# Patient Record
Sex: FEMALE | Race: WHITE | NOT HISPANIC OR LATINO | Employment: OTHER | URBAN - METROPOLITAN AREA
[De-identification: names, ages, dates, MRNs, and addresses within clinical notes are randomized per-mention and may not be internally consistent; named-entity substitution may affect disease eponyms.]

---

## 2017-05-30 ENCOUNTER — TELEPHONE (OUTPATIENT)
Dept: OTHER | Facility: CLINIC | Age: 37
End: 2017-05-30

## 2017-06-05 DIAGNOSIS — Z32.01 PREGNANCY TEST POSITIVE: Primary | ICD-10-CM

## 2017-06-17 ENCOUNTER — HEALTH MAINTENANCE LETTER (OUTPATIENT)
Age: 37
End: 2017-06-17

## 2017-07-05 ENCOUNTER — TRANSFERRED RECORDS (OUTPATIENT)
Dept: HEALTH INFORMATION MANAGEMENT | Facility: CLINIC | Age: 37
End: 2017-07-05

## 2017-07-05 ENCOUNTER — PRENATAL OFFICE VISIT (OUTPATIENT)
Dept: NURSING | Facility: CLINIC | Age: 37
End: 2017-07-05
Payer: COMMERCIAL

## 2017-07-05 ENCOUNTER — PRENATAL OFFICE VISIT (OUTPATIENT)
Dept: OBGYN | Facility: CLINIC | Age: 37
End: 2017-07-05
Payer: COMMERCIAL

## 2017-07-05 VITALS
HEART RATE: 80 BPM | SYSTOLIC BLOOD PRESSURE: 98 MMHG | WEIGHT: 137.7 LBS | BODY MASS INDEX: 26 KG/M2 | HEIGHT: 61 IN | DIASTOLIC BLOOD PRESSURE: 60 MMHG

## 2017-07-05 VITALS
DIASTOLIC BLOOD PRESSURE: 72 MMHG | HEART RATE: 94 BPM | SYSTOLIC BLOOD PRESSURE: 97 MMHG | BODY MASS INDEX: 21.33 KG/M2 | WEIGHT: 136.2 LBS | RESPIRATION RATE: 16 BRPM

## 2017-07-05 DIAGNOSIS — Z34.82 PRENATAL CARE, SUBSEQUENT PREGNANCY, SECOND TRIMESTER: ICD-10-CM

## 2017-07-05 DIAGNOSIS — Z32.01 PREGNANCY TEST POSITIVE: ICD-10-CM

## 2017-07-05 DIAGNOSIS — O09.522 AMA (ADVANCED MATERNAL AGE) MULTIGRAVIDA 35+, SECOND TRIMESTER: ICD-10-CM

## 2017-07-05 DIAGNOSIS — Z34.80 ENCOUNTER FOR SUPERVISION OF OTHER NORMAL PREGNANCY: Primary | ICD-10-CM

## 2017-07-05 LAB
ABO + RH BLD: NORMAL
ABO + RH BLD: NORMAL
BETA HCG QUAL IFA URINE: POSITIVE
BLD GP AB SCN SERPL QL: NORMAL
BLOOD BANK CMNT PATIENT-IMP: NORMAL
ERYTHROCYTE [DISTWIDTH] IN BLOOD BY AUTOMATED COUNT: 12.4 % (ref 10–15)
HBV SURFACE AG SERPL QL IA: NONREACTIVE
HCT VFR BLD AUTO: 35.6 % (ref 35–47)
HGB BLD-MCNC: 12.5 G/DL (ref 11.7–15.7)
HIV 1+2 AB+HIV1 P24 AG SERPL QL IA: NORMAL
MCH RBC QN AUTO: 31.6 PG (ref 26.5–33)
MCHC RBC AUTO-ENTMCNC: 35.1 G/DL (ref 31.5–36.5)
MCV RBC AUTO: 90 FL (ref 78–100)
PLATELET # BLD AUTO: 246 10E9/L (ref 150–450)
RBC # BLD AUTO: 3.95 10E12/L (ref 3.8–5.2)
SPECIMEN EXP DATE BLD: NORMAL
WBC # BLD AUTO: 6.9 10E9/L (ref 4–11)

## 2017-07-05 PROCEDURE — 40000791 ZZHCL STATISTIC VERIFI PRENATAL TRISOMY 21,18,13: Mod: 90 | Performed by: OBSTETRICS & GYNECOLOGY

## 2017-07-05 PROCEDURE — 86900 BLOOD TYPING SEROLOGIC ABO: CPT | Performed by: FAMILY MEDICINE

## 2017-07-05 PROCEDURE — 87086 URINE CULTURE/COLONY COUNT: CPT | Performed by: FAMILY MEDICINE

## 2017-07-05 PROCEDURE — 87389 HIV-1 AG W/HIV-1&-2 AB AG IA: CPT | Performed by: FAMILY MEDICINE

## 2017-07-05 PROCEDURE — 36415 COLL VENOUS BLD VENIPUNCTURE: CPT | Performed by: FAMILY MEDICINE

## 2017-07-05 PROCEDURE — 86762 RUBELLA ANTIBODY: CPT | Performed by: FAMILY MEDICINE

## 2017-07-05 PROCEDURE — 86850 RBC ANTIBODY SCREEN: CPT | Performed by: FAMILY MEDICINE

## 2017-07-05 PROCEDURE — 87491 CHLMYD TRACH DNA AMP PROBE: CPT | Performed by: OBSTETRICS & GYNECOLOGY

## 2017-07-05 PROCEDURE — 99207 ZZC NO CHARGE NURSE ONLY: CPT

## 2017-07-05 PROCEDURE — G0145 SCR C/V CYTO,THINLAYER,RESCR: HCPCS | Performed by: OBSTETRICS & GYNECOLOGY

## 2017-07-05 PROCEDURE — 99207 ZZC FIRST OB VISIT: CPT | Performed by: OBSTETRICS & GYNECOLOGY

## 2017-07-05 PROCEDURE — 87088 URINE BACTERIA CULTURE: CPT | Performed by: FAMILY MEDICINE

## 2017-07-05 PROCEDURE — 86780 TREPONEMA PALLIDUM: CPT | Performed by: FAMILY MEDICINE

## 2017-07-05 PROCEDURE — 87624 HPV HI-RISK TYP POOLED RSLT: CPT | Performed by: OBSTETRICS & GYNECOLOGY

## 2017-07-05 PROCEDURE — 84703 CHORIONIC GONADOTROPIN ASSAY: CPT | Performed by: FAMILY MEDICINE

## 2017-07-05 PROCEDURE — 99000 SPECIMEN HANDLING OFFICE-LAB: CPT | Performed by: OBSTETRICS & GYNECOLOGY

## 2017-07-05 PROCEDURE — 87340 HEPATITIS B SURFACE AG IA: CPT | Performed by: FAMILY MEDICINE

## 2017-07-05 PROCEDURE — 87591 N.GONORRHOEAE DNA AMP PROB: CPT | Performed by: OBSTETRICS & GYNECOLOGY

## 2017-07-05 PROCEDURE — 86901 BLOOD TYPING SEROLOGIC RH(D): CPT | Performed by: FAMILY MEDICINE

## 2017-07-05 PROCEDURE — 85027 COMPLETE CBC AUTOMATED: CPT | Performed by: FAMILY MEDICINE

## 2017-07-05 RX ORDER — NITROFURANTOIN 25; 75 MG/1; MG/1
100 CAPSULE ORAL 2 TIMES DAILY
Qty: 14 CAPSULE | Refills: 0 | COMMUNITY
Start: 2017-07-05 | End: 2017-07-05

## 2017-07-05 NOTE — MR AVS SNAPSHOT
"              After Visit Summary   7/5/2017    Delicia Henry    MRN: [id number]           Patient Information     Date Of Birth          1980        Visit Information        Provider Department      7/5/2017 8:00 AM CR OB CLINIC [facility]        Today's Diagnoses     Pregnancy test positive           Follow-ups after your visit        Your next 10 appointments already scheduled     Jul 05, 2017 10:30 AM CDT   New Prenatal with Shreyas Keene MD   [facility] ([facility]    [address]  [facility] [id number] [phone]              Who to contact     If you have questions or need follow up information about today's clinic visit or your schedule please contact [facility] directly at [phone].  Normal or non-critical lab and imaging results will be communicated to you by MyChart, letter or phone within 4 business days after the clinic has received the results. If you do not hear from us within 7 days, please contact the clinic through MyChart or phone. If you have a critical or abnormal lab result, we will notify you by phone as soon as possible.  Submit refill requests through NextPage or call your pharmacy and they will forward the refill request to us. Please allow 3 business days for your refill to be completed.          Additional Information About Your Visit        MyChart Information     NextPage lets you send messages to your doctor, view your test results, renew your prescriptions, schedule appointments and more. To sign up, go to [email or web address] . Click on \"Log in\" on the left side of the screen, which will take you to the Welcome page. Then click on \"Sign up Now\" on the right side of the page.     You will be asked to enter the access code listed below, as well as some personal information. Please follow the directions to create your username and password.     Your access code is: " [email or web address]  Expires: 10/3/2017  8:58 AM     Your access code will  in 90 days. If you need help or a new code, please call your [place] clinic or [phone].        Care EveryWhere ID     This is your Care EveryWhere ID. This could be used by other organizations to access your [place] medical records  [id number]        Your Vitals Were     Pulse Respirations Last Period BMI (Body Mass Index)          94 16 2017 (Exact Date) 21.33 kg/m2         Blood Pressure from Last 3 Encounters:   17 97/72   14 118/60   14 121/68    Weight from Last 3 Encounters:   17 136 lb 3.2 oz (61.8 kg)   14 132 lb 6.4 oz (60.1 kg)   14 145 lb (65.8 kg)              We Performed the Following     ABO/Rh type and screen     Anti Treponema     Beta HCG qual IFA urine     CBC with platelets     Hepatitis B surface antigen     HIV Antigen Antibody Combo     Rubella Antibody IgG Quantitative     Urine Culture Aerobic Bacterial        Primary Care Provider Office Phone # Fax #    Kole Raymundo [phone]       [facility] [id number] [place] [id number]        Equal Access to Services     HILARIO STEPHENS AH: Hadii aad ku hadasho Soomaali, waaxda luqadaha, qaybta kaalmada adeegyada, waxay idiin hayaan adeeusebio concepcion lakori barlow. So [facility] [phone].    ATENCIÓN: Si habla español, tiene a redmond disposición servicios gratuitos de asistencia lingüística. ame al [phone].    We comply with applicable federal civil rights laws and [place] laws. We do not discriminate on the basis of race, color, national origin, age, disability sex, sexual orientation or gender identity.            Thank you!     Thank you for choosing [facility]  for your care. Our goal is always to provide you with excellent care. Hearing back from our patients is one way we can continue to improve our services. Please take a few minutes to complete the written survey  that you may receive in the mail after your visit with us. Thank you!             Your Updated Medication List - Protect others around you: Learn how to safely use, store and throw away your medicines at www.disposemymeds.org.          This list is accurate as of: 7/5/17  8:58 AM.  Always use your most recent med list.                   Brand Name Dispense Instructions for use Diagnosis    nitroFURantoin (macrocrystal-monohydrate) 100 MG capsule    MACROBID    14 capsule    Take 1 capsule (100 mg) by mouth 2 times daily    Pregnancy test positive       PRENATAL AD PO      Take  by mouth.

## 2017-07-05 NOTE — LETTER
July 17, 2017    Delicia Francoise Raegui  [address] [address]    Dear Delicia,  We are happy to inform you that your PAP smear result from 07/05/2017 is normal.  We are now able to do a follow up test on PAP smears. The DNA test is for HPV (Human Papilloma Virus). Cervical cancer is closely linked with certain types of HPV. Your result showed no evidence of HPV.  Therefore we recommend you return in 3 years for your next pap smear.  You will still need to return to the clinic every year for an annual exam and other preventive tests.  Please contact the clinic at [phone] with any questions.  Sincerely,    Shreyas Keene MD, MD

## 2017-07-05 NOTE — NURSING NOTE
Subjective: 36 year old patient presents for pregnancy confirmation. Her last menstrual period was 3/9/17. She has had a positive home test This is her 5th pregnancy, G5, P3. She has had previous vaginal deliveries. She would like to be seen Dr. Keene for her prenatal care. She has started prenatal vitamins.      Exam:  General Appearance: appears well.  Her urine pregnancy test is positive.    Assessment: positive pregnancy confirmation.    Plan: Patient has an EDC of 12/14/17. Is currently 16w6d weeks along. She is seeing Dr. Keene today for her first OB appointment at 10:30 am. Risk assessment done. We discussed basic pregnancy care, diet, exercise and prenatal vitamins.     Pre Term Labor Risk Assessment   1. Is the patient's age <18 or >40? no  2. Does the patient have a BMI < 18.5? no  3. If previous pregnancy, was delivery within previous 6 months? no  4. Have you ever been diagnosed with pyelonephritis? yes  5. Have you ever delivered a baby prior to 37 weeks gestation? no  6. Have you ever been told you have a uterine anomaly? no  7. Do you currently have uterine fibroids? no  8. Have you had any gynecological surgical procedures such as cervical conization, a LEEP procedure, laser treatment or cryosurgery of the cervix? no  9. Did your mother take JOVANY or any other hormones when she was pregnant with you? no  10. Did conception for this pregnancy occur via In Vitro Fertilization? no  11. Are you carrying twins? no  12. Do you currently use tobacco products? no  13. Prior to this pregnancy, how much alcohol did you drink each week? (if >7/week= high risk..)  3/wk wine  14. Since you learned you were pregnant, how much beer, wine or hard liquor did you drink per week? (anything >0 = high risk) no  15. Prior to learning you are pregnant, did you use any of the following: marijuana, prescription narcotics, speed, cocaine, heroin, hallucinogens or other drugs? (any use within 1 yr = high risk)  no  16. Since you  learned you are pregnant, have you used any of the following: marijuana, prescription narcotics, speed, cocaine, heroin, hallucinogens or other drugs? (any use = high risk)  no  17. Do you have a history of chemical dependency (yes=high risk)  no  18. Have you ever been treated for more than 1 Urinary Tract Infection during this pregnancy? no  19. Do you have a history of Depression, Bi-polar disorder, anxiety, schizophrenia or other mental illness?  no  20. Are you currently being treated for depression, bi-polar disorder, anxiety, schizophrenia or other mental illness? no  21. Have you had Chlamydia or gonorrhea during this pregnancy? no  22. Periodontal disease (gum disease)? no  23. Has anyone hit, slapped, kicked or otherwise hurt you? no  24. Has anyone forced you to have sex when you didn't want to? no  Summary:   Patient is not high risk for  Labor      Nancy Perez RN

## 2017-07-05 NOTE — PROGRESS NOTES
"SUBJECTIVE:  Delicia Henry is a 36 year old  P3, , woman who presents for 1st. OB vist. Patient's last menstrual period was 2017 (exact date).. Periods are regular q 28-30 days.   Current contraception: none        Past Medical History:   Diagnosis Date     Asthma     exercise induced asthma     MEDICAL HISTORY OF -     recurrent uti's urethral dilitation , tigonitis.     Molar pregnancy        Past Surgical History:   Procedure Laterality Date     DILATION AND CURETTAGE SUCTION  2012    Procedure:DILATION AND CURETTAGE SUCTION; DILATION AND CURETTAGE SUCTION ; Surgeon:LEIGHTON GALLAGHER; Location:[facility] OR     ENT SURGERY      tumor rem under ear, benign       Current Outpatient Prescriptions   Medication     Prenatal Vit-DSS-Fe Cbn-FA (PRENATAL AD PO)     No current facility-administered medications for this visit.        Allergies   Allergen Reactions     Avelox Swelling     Swelling tongue, dizzy, itching     Erythromycin Nausea     Sulfa Drugs      Unknown reaction       Social History   Substance Use Topics     Smoking status: Never Smoker     Smokeless tobacco: Never Used     Alcohol use Yes      Comment: occasional         Review of Systems    CONSTITUTIONAL:NEGATIVE  EYES: NEGATIVE  ENT/MOUTH: NEGATIVE  RESP: NEGATIVE  CV: NEGATIVE  GI: NEGATIVE  : NEGATIVE  MUSCULOSKELATAL: NEGATIVE  INTEGUMENTARY/SKIN: NEGATIVE  BREAST: NEGATIVE  NEURO: NEGATIVE.        OBJECTIVE:  BP 98/60  Pulse 80  Ht 5' 1\" (1.549 m)  Wt 137 lb 11.2 oz (62.5 kg)  LMP 2017 (Exact Date)  BMI 26.02 kg/m2  General appearance: Healthy.  Skin: Normal.  Mental Status: cooperative, normal affect, no gross thought process defects.  Thyroid: Normal to palpation, no enlargement or nodules noted.  Breasts: Symmetric without mass tenderness or discharge.  Axillary nodes negative.  Lungs: Clear to auscultation.   Heart.: Normal rate and rhythm.  No murmurs, clicks or gallops.   Abdomen: BS active. Soft, " non-tender, no masses or organomegaly.    Pelvis: normal external genitalia, normal groin lymphatics, normal urethral meatus, normal vaginal mucosa, normal cervix, normal adnexa, no masses or tenderness, uterus 16 week  size .  FHR  155.   Extremities: Normal .       ASSESSMENT:  First OB vist. Advanced maternal age.    PLAN:    1) Prenatal care  2)Discussed  genetic testing (maternal serum screen;  CVS or ammniocentisis if age or genetic history appropriate).

## 2017-07-05 NOTE — NURSING NOTE
"Chief Complaint   Patient presents with     Prenatal Care     16+6 NPN       Initial BP 98/60  Pulse 80  Ht 5' 1\" (1.549 m)  Wt 137 lb 11.2 oz (62.5 kg)  LMP 2017 (Exact Date)  BMI 26.02 kg/m2 Estimated body mass index is 26.02 kg/(m^2) as calculated from the following:    Height as of this encounter: 5' 1\" (1.549 m).    Weight as of this encounter: 137 lb 11.2 oz (62.5 kg).  BP completed using cuff size: regular        The following HM Due: NONE      The following patient reported/Care Every where data was sent to:  P ABSTRACT QUALITY INITIATIVES [43452]  NA     patient has appointment for today    Hermila BOWERS               "

## 2017-07-05 NOTE — MR AVS SNAPSHOT
"              After Visit Summary   2017    Delicia Henry    MRN: [id number]           Patient Information     Date Of Birth          1980        Visit Information        Provider Department      2017 10:30 AM Shreyas Keene MD [facility]        Today's Diagnoses     Encounter for supervision of other normal pregnancy    -  1    AMA (advanced maternal age) multigravida 35+, second trimester        Prenatal care, subsequent pregnancy, second trimester           Follow-ups after your visit        Who to contact     If you have questions or need follow up information about today's clinic visit or your schedule please contact [facility] directly at [phone].  Normal or non-critical lab and imaging results will be communicated to you by MyChart, letter or phone within 4 business days after the clinic has received the results. If you do not hear from us within 7 days, please contact the clinic through [employer] or phone. If you have a critical or abnormal lab result, we will notify you by phone as soon as possible.  Submit refill requests through [employer] or call your pharmacy and they will forward the refill request to us. Please allow 3 business days for your refill to be completed.          Additional Information About Your Visit        MyChart Information     [employer]ctions lets you send messages to your doctor, view your test results, renew your prescriptions, schedule appointments and more. To sign up, go to [email or web address] . Click on \"Log in\" on the left side of the screen, which will take you to the Welcome page. Then click on \"Sign up Now\" on the right side of the page.     You will be asked to enter the access code listed below, as well as some personal information. Please follow the directions to create your username and password.     Your access code is: [email or web address]  Expires: 10/3/2017  8:58 AM     Your access code will  in 90 days. If you " "need help or a new code, please call your [place] clinic or [phone].        Care EveryWhere ID     This is your Care EveryWhere ID. This could be used by other organizations to access your [place] medical records  [id number]        Your Vitals Were     Pulse Height Last Period BMI (Body Mass Index)          80 5' 1\" (1.549 m) 03/09/2017 (Exact Date) 26.02 kg/m2         Blood Pressure from Last 3 Encounters:   07/05/17 98/60   07/05/17 97/72   03/24/14 118/60    Weight from Last 3 Encounters:   07/05/17 137 lb 11.2 oz (62.5 kg)   07/05/17 136 lb 3.2 oz (61.8 kg)   03/24/14 132 lb 6.4 oz (60.1 kg)              We Performed the Following     CHLAMYDIA TRACHOMATIS PCR     HPV High Risk Types DNA Cervical     NEISSERIA GONORRHOEA PCR     Pap imaged thin layer screen with HPV - recommended age 30 - 65 years (select HPV order below)          Today's Medication Changes          These changes are accurate as of: 7/5/17 11:59 PM.  If you have any questions, ask your nurse or doctor.               Stop taking these medicines if you haven't already. Please contact your care team if you have questions.     nitroFURantoin (macrocrystal-monohydrate) 100 MG capsule   Commonly known as:  MACROBID   Stopped by:  Shreyas Keene MD                    Primary Care Provider Office Phone # Fax #    Kole Raymundo [phone]       [address] [address]        Equal Access to Services     [facility] : Hadii aad ku hadasho [place], waaxda luqadaha, qaybta kaalmada adeegyada, waxdavey idiin hayaan adeeg kharash la'aan . So [place] -[phone].    ATENCIÓN: Si habla español, tiene a redmond disposición servicios gratuitos de asistencia lingüística. Llame al [phone].    We comply with applicable federal civil rights laws and [place] laws. We do not discriminate on the basis of race, color, national origin, age, disability sex, sexual orientation or gender " identity.            Thank you!     Thank you for choosing [facility]  for your care. Our goal is always to provide you with excellent care. Hearing back from our patients is one way we can continue to improve our services. Please take a few minutes to complete the written survey that you may receive in the mail after your visit with us. Thank you!             Your Updated Medication List - Protect others around you: Learn how to safely use, store and throw away your medicines at www.disposemymeds.org.          This list is accurate as of: 7/5/17 11:59 PM.  Always use your most recent med list.                   Brand Name Dispense Instructions for use Diagnosis    PRENATAL AD PO      Take  by mouth.

## 2017-07-06 ENCOUNTER — TRANSFERRED RECORDS (OUTPATIENT)
Dept: HEALTH INFORMATION MANAGEMENT | Facility: CLINIC | Age: 37
End: 2017-07-06

## 2017-07-06 LAB
C TRACH DNA SPEC QL NAA+PROBE: NORMAL
N GONORRHOEA DNA SPEC QL NAA+PROBE: NORMAL
RUBV IGG SERPL IA-ACNC: 20 IU/ML
SPECIMEN SOURCE: NORMAL
SPECIMEN SOURCE: NORMAL

## 2017-07-07 LAB
BACTERIA SPEC CULT: ABNORMAL
COPATH REPORT: NORMAL
MICRO REPORT STATUS: ABNORMAL
PAP: NORMAL
SPECIMEN SOURCE: ABNORMAL
T PALLIDUM IGG+IGM SER QL: NEGATIVE

## 2017-07-11 LAB
FINAL DIAGNOSIS: NORMAL
HPV HR 12 DNA CVX QL NAA+PROBE: NEGATIVE
HPV16 DNA SPEC QL NAA+PROBE: NEGATIVE
HPV18 DNA SPEC QL NAA+PROBE: NEGATIVE
SPECIMEN DESCRIPTION: NORMAL

## 2017-07-12 ENCOUNTER — TELEPHONE (OUTPATIENT)
Dept: NURSING | Facility: CLINIC | Age: 37
End: 2017-07-12

## 2017-07-12 DIAGNOSIS — Z34.80 ENCOUNTER FOR SUPERVISION OF OTHER NORMAL PREGNANCY: ICD-10-CM

## 2017-07-12 NOTE — TELEPHONE ENCOUNTER
Verifi results: Negative    Test    Result     Interpretation  Chromosome 21  No aneuploidy detected     Results consistent with two copies of chromosome 21  Chromosome 18  No aneuploidy detected  Results consistent with two copies of chromosome 18  Chromosome 13  No aneuploidy detected  Results consistent with two copies of chromosome 13  Sex Chromosome  No aneuploidy detected  Results consistent with two sex chromosomes (XX-Female)    Pt informed of negative results. Pt wanted to know gender. Informed Female.

## 2017-07-14 NOTE — PROGRESS NOTES
Pap done on 7/5/17. Please send nl pap and Neg HPV letter, (73034) for pap in 3 years and annual physical in 1 year.   Chart reviewed,  rocio.     GINETTE Scanlon RN

## 2017-07-25 PROBLEM — Z34.80 PRENATAL CARE, SUBSEQUENT PREGNANCY: Status: ACTIVE | Noted: 2017-07-25

## 2017-07-26 DIAGNOSIS — O09.529 SUPERVISION OF HIGH-RISK PREGNANCY OF ELDERLY MULTIGRAVIDA: Primary | ICD-10-CM

## 2017-07-27 ENCOUNTER — TELEPHONE (OUTPATIENT)
Dept: OBGYN | Facility: CLINIC | Age: 37
End: 2017-07-27

## 2017-07-27 NOTE — TELEPHONE ENCOUNTER
Pt is calling in, she is 20 wks, , JACOB 17  Pt has a really bad head cold, her nose is running really bad.  She has tried Sudafed didn't help, She has tried the Afrin Nasal Spray and it helps for 10 minutes, she won't try benadryl as it knocks her out.    She is wondering what else she can try.  She asked about Mucinex Looked up and Category C for Pregnancy.    Please advise  Luigi GARDNER, RN advise

## 2017-07-27 NOTE — TELEPHONE ENCOUNTER
Called Dr. Keene at 4:15 pm and he stated I should have called the on call.  He stated what was on the sheet and that she could try Claritin.    I called the pt back and we informed he stated to try the Sudafed, or Claritin.    Pt understood and will give the Afrin spray, sudafed and another try, and try some Claritin    Luigi GARDNER RN

## 2017-08-01 LAB — NON INVASIVE PRENATAL TEST CELL FREE DNA: NORMAL

## 2017-08-07 ENCOUNTER — OFFICE VISIT (OUTPATIENT)
Dept: FAMILY MEDICINE | Facility: CLINIC | Age: 37
End: 2017-08-07
Payer: COMMERCIAL

## 2017-08-07 VITALS
HEIGHT: 67 IN | OXYGEN SATURATION: 100 % | BODY MASS INDEX: 21.35 KG/M2 | HEART RATE: 89 BPM | WEIGHT: 136 LBS | TEMPERATURE: 98 F | SYSTOLIC BLOOD PRESSURE: 110 MMHG | DIASTOLIC BLOOD PRESSURE: 62 MMHG

## 2017-08-07 DIAGNOSIS — J01.90 ACUTE SINUSITIS WITH SYMPTOMS GREATER THAN 10 DAYS: Primary | ICD-10-CM

## 2017-08-07 DIAGNOSIS — J20.9 ACUTE BRONCHITIS WITH COEXISTING CONDITION REQUIRING PROPHYLACTIC TREATMENT: ICD-10-CM

## 2017-08-07 DIAGNOSIS — Z33.1 PREGNANCY, INCIDENTAL: ICD-10-CM

## 2017-08-07 PROCEDURE — 99214 OFFICE O/P EST MOD 30 MIN: CPT | Performed by: PHYSICIAN ASSISTANT

## 2017-08-07 RX ORDER — ALBUTEROL SULFATE 90 UG/1
2 AEROSOL, METERED RESPIRATORY (INHALATION) EVERY 6 HOURS PRN
Qty: 1 INHALER | Refills: 0 | Status: SHIPPED | OUTPATIENT
Start: 2017-08-07 | End: 2017-08-29

## 2017-08-07 NOTE — PROGRESS NOTES
"  SUBJECTIVE:                                                    Delicia Henry is a 36 year old female who presents to clinic today for the following health issues:    Acute Illness  The patient, 5 months pregnant, reports a gradual onset of frontal/maxillary sinus headache and sore throat from coughing 3-4 weeks ago with associated sinus pressure, non productive cough, rhinorrhea, congestion, wheeze, and fatigue/achy. Sore throat has subsided. She reports having these symptoms in the past. She has tried treatment including afrin with moderate symptom relief, sudafed, flonase, and robitussin with no relief, and benadryl which \"knocks me out\". Denies any alleviating or exacerbating factors. She denies any fever, chills/sweats, conjunctivitis, ear pain, decreased appetite, NVD, dysuria, or any other related symptoms or complaints. Of note, she runs a [employer] and has has ill contacts.    Problem list and histories reviewed & adjusted, as indicated.  Additional history: none    Patient Active Problem List   Diagnosis     CARDIOVASCULAR SCREENING; LDL GOAL LESS THAN 160     Cleft palate     Exercise-induced asthma     UTI (urinary tract infection)     Encounter for supervision of other normal pregnancy     Indication for care in labor or delivery     Prenatal care, subsequent pregnancy     Past Surgical History:   Procedure Laterality Date     DILATION AND CURETTAGE SUCTION  2/13/2012    Procedure:DILATION AND CURETTAGE SUCTION; DILATION AND CURETTAGE SUCTION ; Surgeon:LEIGHTON GALLAGHER; Location:[facility] OR     ENT SURGERY      tumor rem under ear, benign       Social History   Substance Use Topics     Smoking status: Never Smoker     Smokeless tobacco: Never Used     Alcohol use No      Comment: Pregnant     Family History   Problem Relation Age of Onset     Allergies Father      seasonal     Allergies Sister      seasonal         Current Outpatient Prescriptions   Medication Sig Dispense Refill     " "amoxicillin-clavulanate (AUGMENTIN) 875-125 MG per tablet Take 1 tablet by mouth 2 times daily for 10 days 20 tablet 0     albuterol (PROAIR HFA/PROVENTIL HFA/VENTOLIN HFA) 108 (90 BASE) MCG/ACT Inhaler Inhale 2 puffs into the lungs every 6 hours as needed for shortness of breath / dyspnea or wheezing 1 Inhaler 0     Prenatal Vit-DSS-Fe Cbn-FA (PRENATAL AD PO) Take  by mouth.       Allergies   Allergen Reactions     Avelox Swelling     Swelling tongue, dizzy, itching     Erythromycin Nausea     Sulfa Drugs      Unknown reaction     Labs reviewed in EPIC      Reviewed and updated as needed this visit by clinical staff  Tobacco  Allergies  Meds  Problems  Med Hx  Surg Hx  Fam Hx  Soc Hx        Reviewed and updated as needed this visit by Provider  Tobacco  Allergies  Meds  Problems  Med Hx  Surg Hx  Fam Hx  Soc Hx          ROS:  Constitutional, HEENT, cardiovascular, pulmonary, GI, , musculoskeletal, neuro, skin, endocrine and psych systems are negative, except as otherwise noted.      OBJECTIVE:   /62 (BP Location: Left arm, Patient Position: Chair, Cuff Size: Adult Regular)  Pulse 89  Temp 98  F (36.7  C) (Oral)  Ht 5' 7\" (1.702 m)  Wt 136 lb (61.7 kg)  LMP 03/09/2017 (Exact Date)  SpO2 100%  BMI 21.3 kg/m2  Body mass index is 21.3 kg/(m^2).  GENERAL: healthy, alert and no distress  EYES: Eyes grossly normal to inspection, PERRL and conjunctivae and sclerae normal  HENT: Erythematous right TM. ear canals normal, nose and mouth without ulcers or lesions  NECK: no adenopathy, no asymmetry, masses, or scars and thyroid normal to palpation  RESP: Inspiratory and expiratory wheezes diffusely.  CV: regular rate and rhythm, normal S1 S2, no S3 or S4, no murmur, click or rub, no peripheral edema and peripheral pulses strong  MS: no gross musculoskeletal defects noted, no edema  NEURO: Normal strength and tone, mentation intact and speech normal  PSYCH: mentation appears normal, affect " normal/bright    Diagnostic Test Results:  none     ASSESSMENT/PLAN:     Delicia was seen today for sinus problem.    Diagnoses and all orders for this visit:    Acute sinusitis with symptoms greater than 10 days; Acute bronchitis with coexisting condition requiring prophylactic treatment; Pregnancy, incidental; Patient is 5 months pregnant.  She has done a large amount of conservative care without good relief.  Due to wheezing and PMH significant for asthma will start on albuterol. Will treat with Augmentin for 10 days, until complete. Follow with new or worsening symptoms.   -     amoxicillin-clavulanate (AUGMENTIN) 875-125 MG per tablet; Take 1 tablet by mouth 2 times daily for 10 days  -     albuterol (PROAIR HFA/PROVENTIL HFA/VENTOLIN HFA) 108 (90 BASE) MCG/ACT Inhaler; Inhale 2 puffs into the lungs every 6 hours as needed for shortness of breath / dyspnea or wheezing      See Patient Instructions    Nancy Carlos PA-C  [facility]

## 2017-08-07 NOTE — NURSING NOTE
"Chief Complaint   Patient presents with     Sinus Problem       Initial /62 (BP Location: Left arm, Patient Position: Chair, Cuff Size: Adult Regular)  Pulse 89  Temp 98  F (36.7  C) (Oral)  Ht 5' 7\" (1.702 m)  Wt 136 lb (61.7 kg)  LMP 03/09/2017 (Exact Date)  SpO2 100%  BMI 21.3 kg/m2 Estimated body mass index is 21.3 kg/(m^2) as calculated from the following:    Height as of this encounter: 5' 7\" (1.702 m).    Weight as of this encounter: 136 lb (61.7 kg).  Medication Reconciliation: complete   Csaba Mlnarik CMA    "

## 2017-08-07 NOTE — MR AVS SNAPSHOT
After Visit Summary   8/7/2017    Delicia Henry    MRN: [id number]           Patient Information     Date Of Birth          1980        Visit Information        Provider Department      8/7/2017 6:40 PM Nancy Carlos PA-C [facility]        Today's Diagnoses     Acute sinusitis with symptoms greater than 10 days    -  1    Acute bronchitis with coexisting condition requiring prophylactic treatment           Follow-ups after your visit        Your next 10 appointments already scheduled     Aug 11, 2017  9:45 AM CDT   US OB > 14 WEEKS COMPLETE SINGLE with RIUS1   [facility] ([facility])    [address]  [facility] [id number] [phone]           Please bring a list of your medicines (including vitamins, minerals and over-the-counter drugs). Also, tell your doctor about any allergies you may have. Wear comfortable clothes and leave your valuables at home.  If you re less than 20 weeks drink four 8-ounce glasses of fluid an hour before your exam. If you need to empty your bladder before your exam, try to release only a little urine. Then, drink another glass of fluid.  You may have up to two family members in the exam room. If you bring a small child, an adult must be there to care for him or her.  Please call the Imaging Department at your exam site with any questions.            Aug 21, 2017  2:15 PM CDT   ESTABLISHED PRENATAL with Shreyas Keene MD   [facility] ([facility])    [address] [place] [id number] [phone]              Who to contact     If you have questions or need follow up information about today's clinic visit or your schedule please contact [facility] directly at [phone].  Normal or non-critical lab and imaging results will be communicated to you by MyChart, letter or phone within 4 business days  "after the clinic has received the results. If you do not hear from us within 7 days, please contact the clinic through InPronto or phone. If you have a critical or abnormal lab result, we will notify you by phone as soon as possible.  Submit refill requests through InPronto or call your pharmacy and they will forward the refill request to us. Please allow 3 business days for your refill to be completed.          Additional Information About Your Visit        InPronto Information     InPronto lets you send messages to your doctor, view your test results, renew your prescriptions, schedule appointments and more. To sign up, go to [email or web address] . Click on \"Log in\" on the left side of the screen, which will take you to the Welcome page. Then click on \"Sign up Now\" on the right side of the page.     You will be asked to enter the access code listed below, as well as some personal information. Please follow the directions to create your username and password.     Your access code is: [email or web address]  Expires: 10/3/2017  8:58 AM     Your access code will  in 90 days. If you need help or a new code, please call your [place] clinic or [phone].        Care EveryWhere ID     This is your Care EveryWhere ID. This could be used by other organizations to access your [place] medical records  [id number]        Your Vitals Were     Pulse Temperature Height Last Period Pulse Oximetry BMI (Body Mass Index)    89 98  F (36.7  C) (Oral) 5' 7\" (1.702 m) 2017 (Exact Date) 100% 21.3 kg/m2       Blood Pressure from Last 3 Encounters:   17 110/62   17 98/60   17 97/72    Weight from Last 3 Encounters:   17 136 lb (61.7 kg)   17 137 lb 11.2 oz (62.5 kg)   17 136 lb 3.2 oz (61.8 kg)              Today, you had the following     No orders found for display         Today's Medication Changes          These changes are accurate as of: 17  7:24 PM.  If you have any questions, ask your " nurse or doctor.               Start taking these medicines.        Dose/Directions    albuterol 108 (90 BASE) MCG/ACT Inhaler   Commonly known as:  PROAIR HFA/PROVENTIL HFA/VENTOLIN HFA   Used for:  Acute bronchitis with coexisting condition requiring prophylactic treatment   Started by:  Nancy Carlos PA-C        Dose:  2 puff   Inhale 2 puffs into the lungs every 6 hours as needed for shortness of breath / dyspnea or wheezing   Quantity:  1 Inhaler   Refills:  0       amoxicillin-clavulanate 875-125 MG per tablet   Commonly known as:  AUGMENTIN   Used for:  Acute sinusitis with symptoms greater than 10 days, Acute bronchitis with coexisting condition requiring prophylactic treatment   Started by:  Nancy Carlos PA-C        Dose:  1 tablet   Take 1 tablet by mouth 2 times daily for 10 days   Quantity:  20 tablet   Refills:  0            Where to get your medicines      These medications were sent to [facility] [address] [place] [address] AT [facility] [address] [place] [address], [facility] [address]    Hours:  24-hours Phone:  [phone]     albuterol 108 (90 BASE) MCG/ACT Inhaler    amoxicillin-clavulanate 875-125 MG per tablet                Primary Care Provider Office Phone # Fax #    Kole Raymundo [phone]       [address] [address]        Equal Access to Services     [facility] AH: Hadii edgardo hernández hadasho Sorabia, waaxda luqadaha, qaybta kaalmada adeeusebioyada, carlo pacheco . So [facility] [phone].    ATENCIÓN: Si habla español, tiene a redmond disposición servicios gratuitos de asistencia lingüística. Llame al [phone].    We comply with applicable federal civil rights laws and [place] laws. We do not discriminate on the basis of race, color, national origin, age, disability sex, sexual orientation or gender identity.            Thank you!     Thank you for  choosing [facility]  for your care. Our goal is always to provide you with excellent care. Hearing back from our patients is one way we can continue to improve our services. Please take a few minutes to complete the written survey that you may receive in the mail after your visit with us. Thank you!             Your Updated Medication List - Protect others around you: Learn how to safely use, store and throw away your medicines at www.disposemymeds.org.          This list is accurate as of: 8/7/17  7:24 PM.  Always use your most recent med list.                   Brand Name Dispense Instructions for use Diagnosis    albuterol 108 (90 BASE) MCG/ACT Inhaler    PROAIR HFA/PROVENTIL HFA/VENTOLIN HFA    1 Inhaler    Inhale 2 puffs into the lungs every 6 hours as needed for shortness of breath / dyspnea or wheezing    Acute bronchitis with coexisting condition requiring prophylactic treatment       amoxicillin-clavulanate 875-125 MG per tablet    AUGMENTIN    20 tablet    Take 1 tablet by mouth 2 times daily for 10 days    Acute sinusitis with symptoms greater than 10 days, Acute bronchitis with coexisting condition requiring prophylactic treatment       PRENATAL AD PO      Take  by mouth.

## 2017-08-11 ENCOUNTER — RADIANT APPOINTMENT (OUTPATIENT)
Dept: ULTRASOUND IMAGING | Facility: CLINIC | Age: 37
End: 2017-08-11
Attending: OBSTETRICS & GYNECOLOGY
Payer: COMMERCIAL

## 2017-08-11 DIAGNOSIS — O09.529 SUPERVISION OF HIGH-RISK PREGNANCY OF ELDERLY MULTIGRAVIDA: ICD-10-CM

## 2017-08-11 PROCEDURE — 76805 OB US >/= 14 WKS SNGL FETUS: CPT | Performed by: FAMILY MEDICINE

## 2017-08-28 ENCOUNTER — PRENATAL OFFICE VISIT (OUTPATIENT)
Dept: OBGYN | Facility: CLINIC | Age: 37
End: 2017-08-28
Payer: COMMERCIAL

## 2017-08-28 VITALS
WEIGHT: 143.1 LBS | DIASTOLIC BLOOD PRESSURE: 72 MMHG | SYSTOLIC BLOOD PRESSURE: 102 MMHG | TEMPERATURE: 98.2 F | HEIGHT: 67 IN | BODY MASS INDEX: 22.46 KG/M2

## 2017-08-28 DIAGNOSIS — Z34.82 PRENATAL CARE, SUBSEQUENT PREGNANCY, SECOND TRIMESTER: Primary | ICD-10-CM

## 2017-08-28 PROCEDURE — 99207 ZZC PRENATAL VISIT: CPT | Performed by: OBSTETRICS & GYNECOLOGY

## 2017-08-28 NOTE — MR AVS SNAPSHOT
"              After Visit Summary   8/28/2017    Delicia Henry    MRN: [id number]           Patient Information     Date Of Birth          1980        Visit Information        Provider Department      8/28/2017 3:45 PM Shreyas Keene MD [facility]        Today's Diagnoses     Prenatal care, subsequent pregnancy, second trimester    -  1       Follow-ups after your visit        Your next 10 appointments already scheduled     Sep 18, 2017  2:30 PM CDT   ESTABLISHED PRENATAL with Shreyas Keene MD   [facility] ([facility]    [address]  [facility] [address]   [phone]              Who to contact     If you have questions or need follow up information about today's clinic visit or your schedule please contact [facility] directly at [phone].  Normal or non-critical lab and imaging results will be communicated to you by Brain Tunnel[employer]ix Technologieshart, letter or phone within 4 business days after the clinic has received the results. If you do not hear from us within 7 days, please contact the clinic through MyChart or phone. If you have a critical or abnormal lab result, we will notify you by phone as soon as possible.  Submit refill requests through [email or web address] or call your pharmacy and they will forward the refill request to us. Please allow 3 business days for your refill to be completed.          Additional Information About Your Visit        MyChart Information     [email or web address] lets you send messages to your doctor, view your test results, renew your prescriptions, schedule appointments and more. To sign up, go to [email or web address] . Click on \"Log in\" on the left side of the screen, which will take you to the Welcome page. Then click on \"Sign up Now\" on the right side of the page.     You will be asked to enter the access code listed below, as well as some personal information. Please follow the directions to create your " "username and password.     Your access code is: [email or web address]  Expires: 10/3/2017  8:58 AM     Your access code will  in 90 days. If you need help or a new code, please call your [place] clinic or [phone].        Care EveryWhere ID     This is your Care EveryWhere ID. This could be used by other organizations to access your [place] medical records  [id number]        Your Vitals Were     Temperature Height Last Period Breastfeeding? BMI (Body Mass Index)       98.2  F (36.8  C) (Oral) 5' 7\" (1.702 m) 2017 (Exact Date) No 22.41 kg/m2        Blood Pressure from Last 3 Encounters:   17 102/72   17 110/62   17 98/60    Weight from Last 3 Encounters:   17 143 lb 1.6 oz (64.9 kg)   17 136 lb (61.7 kg)   17 137 lb 11.2 oz (62.5 kg)              Today, you had the following     No orders found for display       Primary Care Provider Office Phone # Fax #    Kole Raymundo [phone] [address] [facility] [address]        Equal Access to Services     [facility] AH: Hadii edgardo holliso Soomaali, waaxda luqadaha, qaybta kaalmada adeegyada, carlo pacheco . So [facility] [phone].    ATENCIÓN: Si habla español, tiene a redmond disposición servicios gratuitos de asistencia lingüística. ame al [phone].    We comply with applicable federal civil rights laws and [place] laws. We do not discriminate on the basis of race, color, national origin, age, disability sex, sexual orientation or gender identity.            Thank you!     Thank you for choosing [facility]  for your care. Our goal is always to provide you with excellent care. Hearing back from our patients is one way we can continue to improve our services. Please take a few minutes to complete the written survey that you may receive in the mail after your visit with us. Thank you!             Your Updated Medication List - Protect others around " you: Learn how to safely use, store and throw away your medicines at www.disposemymeds.org.          This list is accurate as of: 8/28/17 11:59 PM.  Always use your most recent med list.                   Brand Name Dispense Instructions for use Diagnosis    PRENATAL AD PO      Take  by mouth.

## 2017-08-28 NOTE — NURSING NOTE
"Chief Complaint   Patient presents with     Prenatal Care     24weeks 4days       Initial /72  Temp 98.2  F (36.8  C) (Oral)  Ht 5' 7\" (1.702 m)  Wt 143 lb 1.6 oz (64.9 kg)  LMP 03/09/2017 (Exact Date)  Breastfeeding? No  BMI 22.41 kg/m2 Estimated body mass index is 22.41 kg/(m^2) as calculated from the following:    Height as of this encounter: 5' 7\" (1.702 m).    Weight as of this encounter: 143 lb 1.6 oz (64.9 kg).  Medication Reconciliation: complete   Faith Brooks MA    "

## 2017-08-29 DIAGNOSIS — J20.9 ACUTE BRONCHITIS WITH COEXISTING CONDITION REQUIRING PROPHYLACTIC TREATMENT: ICD-10-CM

## 2017-08-29 NOTE — TELEPHONE ENCOUNTER
Ventolin        Last Written Prescription Date: 8/7/17  Last Fill Quantity: 1, # refills: 0    Last Office [facility] or [facility] prescribing provider:  8/7/17   Future Office Visit:    Next 5 appointments (look out 90 days)     Sep 18, 2017  2:30 PM CDT   ESTABLISHED PRENATAL with Shreyas Keene MD   [facility]    [address]  [facility] [address] [phone]                   Date of Last Asthma Action Plan Letter:   There are no preventive care reminders to display for this patient.   Asthma Control Test: No flowsheet data found.    Date of Last Spirometry Test:   No results found for this or any previous visit.

## 2017-08-30 RX ORDER — ALBUTEROL SULFATE 90 UG/1
AEROSOL, METERED RESPIRATORY (INHALATION)
Qty: 18 G | Refills: 0 | Status: SHIPPED | OUTPATIENT
Start: 2017-08-30 | End: 2018-01-17

## 2017-08-30 NOTE — TELEPHONE ENCOUNTER
"Called home number-voice mail says \"legacy insurance\"  No message left    patient had a prenatal office visit and reported not taking medication on 08/28/17    Tegan Lawrence RN  [facility]  [phone]      "

## 2017-09-18 ENCOUNTER — PRENATAL OFFICE VISIT (OUTPATIENT)
Dept: OBGYN | Facility: CLINIC | Age: 37
End: 2017-09-18
Payer: COMMERCIAL

## 2017-09-18 VITALS — WEIGHT: 144.5 LBS | SYSTOLIC BLOOD PRESSURE: 98 MMHG | BODY MASS INDEX: 22.63 KG/M2 | DIASTOLIC BLOOD PRESSURE: 68 MMHG

## 2017-09-18 DIAGNOSIS — Z34.82 PRENATAL CARE, SUBSEQUENT PREGNANCY, SECOND TRIMESTER: Primary | ICD-10-CM

## 2017-09-18 LAB
BLD GP AB SCN SERPL QL: NORMAL
GLUCOSE 1H P 50 G GLC PO SERPL-MCNC: 103 MG/DL (ref 60–129)
HGB BLD-MCNC: 12 G/DL (ref 11.7–15.7)

## 2017-09-18 PROCEDURE — 86850 RBC ANTIBODY SCREEN: CPT | Performed by: OBSTETRICS & GYNECOLOGY

## 2017-09-18 PROCEDURE — 00000218 ZZHCL STATISTIC OBHBG - HEMOGLOBIN: Performed by: OBSTETRICS & GYNECOLOGY

## 2017-09-18 PROCEDURE — 90471 IMMUNIZATION ADMIN: CPT | Performed by: OBSTETRICS & GYNECOLOGY

## 2017-09-18 PROCEDURE — 82950 GLUCOSE TEST: CPT | Performed by: OBSTETRICS & GYNECOLOGY

## 2017-09-18 PROCEDURE — 99207 ZZC PRENATAL VISIT: CPT | Performed by: OBSTETRICS & GYNECOLOGY

## 2017-09-18 PROCEDURE — 90715 TDAP VACCINE 7 YRS/> IM: CPT | Performed by: OBSTETRICS & GYNECOLOGY

## 2017-09-18 PROCEDURE — 36415 COLL VENOUS BLD VENIPUNCTURE: CPT | Performed by: OBSTETRICS & GYNECOLOGY

## 2017-09-18 PROCEDURE — 86780 TREPONEMA PALLIDUM: CPT | Performed by: OBSTETRICS & GYNECOLOGY

## 2017-09-18 PROCEDURE — 96372 THER/PROPH/DIAG INJ SC/IM: CPT | Performed by: OBSTETRICS & GYNECOLOGY

## 2017-09-18 NOTE — MR AVS SNAPSHOT
"              After Visit Summary   2017    Delicia Henry    MRN: [id number]           Patient Information     Date Of Birth          1980        Visit Information        Provider Department      2017 2:30 PM Shreyas Keene MD [facility]        Today's Diagnoses     Prenatal care, subsequent pregnancy, second trimester    -  1       Follow-ups after your visit        Who to contact     If you have questions or need follow up information about today's clinic visit or your schedule please contact [facility] directly at [phone].  Normal or non-critical lab and imaging results will be communicated to you by Axial Healthcarehart, letter or phone within 4 business days after the clinic has received the results. If you do not hear from us within 7 days, please contact the clinic through Wallflowert or phone. If you have a critical or abnormal lab result, we will notify you by phone as soon as possible.  Submit refill requests through [email or web address] or call your pharmacy and they will forward the refill request to us. Please allow 3 business days for your refill to be completed.          Additional Information About Your Visit        MyChart Information     [email or web address] lets you send messages to your doctor, view your test results, renew your prescriptions, schedule appointments and more. To sign up, go to [email or web address] . Click on \"Log in\" on the left side of the screen, which will take you to the Welcome page. Then click on \"Sign up Now\" on the right side of the page.     You will be asked to enter the access code listed below, as well as some personal information. Please follow the directions to create your username and password.     Your access code is: [email or web address]  Expires: 10/3/2017  8:58 AM     Your access code will  in 90 days. If you need help or a new code, please call your [facility] or [phone].        Care EveryWhere ID     This is your Care " EveryWhere ID. This could be used by other organizations to access your [place] medical records  [id number]        Your Vitals Were     Last Period BMI (Body Mass Index)                03/09/2017 (Exact Date) 22.63 kg/m2           Blood Pressure from Last 3 Encounters:   09/18/17 98/68   08/28/17 102/72   08/07/17 110/62    Weight from Last 3 Encounters:   09/18/17 144 lb 8 oz (65.5 kg)   08/28/17 143 lb 1.6 oz (64.9 kg)   08/07/17 136 lb (61.7 kg)              We Performed the Following     Anti Treponema     Antibody screen red cell     Glucose tolerance, gest screen, 1 hour     OB hemoglobin        Primary Care Provider Office Phone # Fax #    Kole Raymundo [phone] [address] [facility] [address]        Equal Access to Services     [facility] : Hadii edgardo holliso Sorabia, waaxda luqadaha, qaybta kaalmada adejana, carlo pacheco . So [facility] [phone].    ATENCIÓN: Si habla español, tiene a redmond disposición servicios gratuitos de asistencia lingüística. Llame al [phone].    We comply with applicable federal civil rights laws and [place] laws. We do not discriminate on the basis of race, color, national origin, age, disability sex, sexual orientation or gender identity.            Thank you!     Thank you for choosing [facility]  for your care. Our goal is always to provide you with excellent care. Hearing back from our patients is one way we can continue to improve our services. Please take a few minutes to complete the written survey that you may receive in the mail after your visit with us. Thank you!             Your Updated Medication List - Protect others around you: Learn how to safely use, store and throw away your medicines at www.disposemymeds.org.          This list is accurate as of: 9/18/17  3:01 PM.  Always use your most recent med list.                   Brand Name Dispense Instructions for use Diagnosis     PRENATAL AD PO      Take  by mouth.        VENTOLIN  (90 BASE) MCG/ACT Inhaler   Generic drug:  albuterol     18 g    INHALE 2 PUFFS EVERY 6 HOURS AS NEEDED FOR SHORTNESS OF BREATH OR WHEEZING    Acute bronchitis with coexisting condition requiring prophylactic treatment

## 2017-09-18 NOTE — NURSING NOTE
"Chief Complaint   Patient presents with     Prenatal Care   27w4d  Pt having 12 hour glucose, pt is B-.  Discuss BC.  Shara Finley MA      Initial BP 98/68 (BP Location: Right arm, Patient Position: Chair, Cuff Size: Adult Regular)  Wt 144 lb 8 oz (65.5 kg)  LMP 03/09/2017 (Exact Date)  BMI 22.63 kg/m2 Estimated body mass index is 22.63 kg/(m^2) as calculated from the following:    Height as of 8/28/17: 5' 7\" (1.702 m).    Weight as of this encounter: 144 lb 8 oz (65.5 kg).  Medication Reconciliation: complete    "

## 2017-09-19 LAB — T PALLIDUM IGG+IGM SER QL: NEGATIVE

## 2017-10-16 ENCOUNTER — PRENATAL OFFICE VISIT (OUTPATIENT)
Dept: OBGYN | Facility: CLINIC | Age: 37
End: 2017-10-16
Payer: COMMERCIAL

## 2017-10-16 VITALS
WEIGHT: 146.1 LBS | BODY MASS INDEX: 23.48 KG/M2 | HEIGHT: 66 IN | DIASTOLIC BLOOD PRESSURE: 70 MMHG | SYSTOLIC BLOOD PRESSURE: 112 MMHG

## 2017-10-16 DIAGNOSIS — Z34.83 PRENATAL CARE, SUBSEQUENT PREGNANCY IN THIRD TRIMESTER: Primary | ICD-10-CM

## 2017-10-16 PROCEDURE — 99207 ZZC PRENATAL VISIT: CPT | Performed by: OBSTETRICS & GYNECOLOGY

## 2017-10-16 NOTE — NURSING NOTE
"31w4d  Chief Complaint   Patient presents with     Prenatal Care       Initial /70  Ht 5' 6\" (1.676 m)  Wt 146 lb 1.6 oz (66.3 kg)  LMP 03/09/2017 (Exact Date)  BMI 23.58 kg/m2 Estimated body mass index is 23.58 kg/(m^2) as calculated from the following:    Height as of this encounter: 5' 6\" (1.676 m).    Weight as of this encounter: 146 lb 1.6 oz (66.3 kg).  Medication Reconciliation: complete      "

## 2017-10-16 NOTE — MR AVS SNAPSHOT
"              After Visit Summary   10/16/2017    Delicia Henry    MRN: [id number]           Patient Information     Date Of Birth          1980        Visit Information        Provider Department      10/16/2017 3:30 PM Shreyas Keene MD [facility]        Today's Diagnoses     Prenatal care, subsequent pregnancy in third trimester    -  1       Follow-ups after your visit        Who to contact     If you have questions or need follow up information about today's clinic visit or your schedule please contact [facility] directly at [phone].  Normal or non-critical lab and imaging results will be communicated to you by Kenta Bi[employer]hart, letter or phone within 4 business days after the clinic has received the results. If you do not hear from us within 7 days, please contact the clinic through SYSTRANt or phone. If you have a critical or abnormal lab result, we will notify you by phone as soon as possible.  Submit refill requests through EcoEridania or call your pharmacy and they will forward the refill request to us. Please allow 3 business days for your refill to be completed.          Additional Information About Your Visit        MyChart Information     EcoEridania lets you send messages to your doctor, view your test results, renew your prescriptions, schedule appointments and more. To sign up, go to [email or web address] . Click on \"Log in\" on the left side of the screen, which will take you to the Welcome page. Then click on \"Sign up Now\" on the right side of the page.     You will be asked to enter the access code listed below, as well as some personal information. Please follow the directions to create your username and password.     Your access code is: [email or web address]  Expires: 2018  3:48 PM     Your access code will  in 90 days. If you need help or a new code, please call your [facility] or [phone].        Care EveryWhere ID     This is your Care " "EveryWhere ID. This could be used by other organizations to access your [place] medical records  [id number]        Your Vitals Were     Height Last Period BMI (Body Mass Index)             5' 6\" (1.676 m) 03/09/2017 (Exact Date) 23.58 kg/m2          Blood Pressure from Last 3 Encounters:   10/16/17 112/70   09/18/17 98/68   08/28/17 102/72    Weight from Last 3 Encounters:   10/16/17 146 lb 1.6 oz (66.3 kg)   09/18/17 144 lb 8 oz (65.5 kg)   08/28/17 143 lb 1.6 oz (64.9 kg)              Today, you had the following     No orders found for display       Primary Care Provider Office Phone # Fax #    Kole Raymundo [phone] [address] [facility] [address]        Equal Access to Services     [facility]: Hadii edgardo hernández hadasho Sorabia, waaxda luqadaha, qaybta kaalmada adeegyada, carlo pacheco . So [facility] [phone].    ATENCIÓN: Si habla español, tiene a redmond disposición servicios gratuitos de asistencia lingüística. Llame al [phone].    We comply with applicable federal civil rights laws and [place] laws. We do not discriminate on the basis of race, color, national origin, age, disability, sex, sexual orientation, or gender identity.            Thank you!     Thank you for choosing [facility]  for your care. Our goal is always to provide you with excellent care. Hearing back from our patients is one way we can continue to improve our services. Please take a few minutes to complete the written survey that you may receive in the mail after your visit with us. Thank you!             Your Updated Medication List - Protect others around you: Learn how to safely use, store and throw away your medicines at www.disposemymeds.org.          This list is accurate as of: 10/16/17  3:48 PM.  Always use your most recent med list.                   Brand Name Dispense Instructions for use Diagnosis    PRENATAL AD PO      Take  by mouth.        " VENTOLIN  (90 BASE) MCG/ACT Inhaler   Generic drug:  albuterol     18 g    INHALE 2 PUFFS EVERY 6 HOURS AS NEEDED FOR SHORTNESS OF BREATH OR WHEEZING    Acute bronchitis with coexisting condition requiring prophylactic treatment

## 2017-11-01 ENCOUNTER — PRENATAL OFFICE VISIT (OUTPATIENT)
Dept: OBGYN | Facility: CLINIC | Age: 37
End: 2017-11-01
Payer: COMMERCIAL

## 2017-11-01 VITALS — SYSTOLIC BLOOD PRESSURE: 98 MMHG | BODY MASS INDEX: 24.57 KG/M2 | DIASTOLIC BLOOD PRESSURE: 70 MMHG | WEIGHT: 152.2 LBS

## 2017-11-01 DIAGNOSIS — Z34.83 PRENATAL CARE, SUBSEQUENT PREGNANCY IN THIRD TRIMESTER: ICD-10-CM

## 2017-11-01 DIAGNOSIS — Z23 NEED FOR PROPHYLACTIC VACCINATION AND INOCULATION AGAINST INFLUENZA: Primary | ICD-10-CM

## 2017-11-01 PROCEDURE — 90471 IMMUNIZATION ADMIN: CPT | Performed by: OBSTETRICS & GYNECOLOGY

## 2017-11-01 PROCEDURE — 99207 ZZC PRENATAL VISIT: CPT | Performed by: OBSTETRICS & GYNECOLOGY

## 2017-11-01 PROCEDURE — 90686 IIV4 VACC NO PRSV 0.5 ML IM: CPT | Performed by: OBSTETRICS & GYNECOLOGY

## 2017-11-01 NOTE — PROGRESS NOTES

## 2017-11-01 NOTE — NURSING NOTE
"Chief Complaint   Patient presents with     Prenatal Care   33w6d  Shara Finley MA      Initial BP 98/70 (BP Location: Right arm, Patient Position: Chair, Cuff Size: Adult Regular)  Wt 152 lb 3.2 oz (69 kg)  LMP 03/09/2017 (Exact Date)  BMI 24.57 kg/m2 Estimated body mass index is 24.57 kg/(m^2) as calculated from the following:    Height as of 10/16/17: 5' 6\" (1.676 m).    Weight as of this encounter: 152 lb 3.2 oz (69 kg).  Medication Reconciliation: complete    "

## 2017-11-01 NOTE — MR AVS SNAPSHOT
"              After Visit Summary   11/1/2017    Delicia Henry    MRN: [id number]           Patient Information     Date Of Birth          1980        Visit Information        Provider Department      11/1/2017 11:15 AM Shreyas Keene MD [facility]        Today's Diagnoses     Need for prophylactic vaccination and inoculation against influenza    -  1    Prenatal care, subsequent pregnancy in third trimester        SGA (small for gestational age)           Follow-ups after your visit        Future tests that were ordered for you today     Open Future Orders        Priority Expected Expires Ordered    US OB Ltd One Or More Fetus FU/Repeat Routine  11/1/2018 11/1/2017            Who to contact     If you have questions or need follow up information about today's clinic visit or your schedule please contact [facility] directly at [phone].  Normal or non-critical lab and imaging results will be communicated to you by MyChart, letter or phone within 4 business days after the clinic has received the results. If you do not hear from us within 7 days, please contact the clinic through MyChart or phone. If you have a critical or abnormal lab result, we will notify you by phone as soon as possible.  Submit refill requests through [employer] or call your pharmacy and they will forward the refill request to us. Please allow 3 business days for your refill to be completed.          Additional Information About Your Visit        MyChart Information     [employer] lets you send messages to your doctor, view your test results, renew your prescriptions, schedule appointments and more. To sign up, go to [email or web address] . Click on \"Log in\" on the left side of the screen, which will take you to the Welcome page. Then click on \"Sign up Now\" on the right side of the page.     You will be asked to enter the access code listed below, as well as some personal information. Please " follow the directions to create your username and password.     Your access code is: [email or web address]  Expires: 2018  3:48 PM     Your access code will  in 90 days. If you need help or a new code, please call your [place] clinic or [phone].        Care EveryWhere ID     This is your Care EveryWhere ID. This could be used by other organizations to access your [place] medical records  [id number]        Your Vitals Were     Last Period BMI (Body Mass Index)                2017 (Exact Date) 24.57 kg/m2           Blood Pressure from Last 3 Encounters:   17 98/70   10/16/17 112/70   17 98/68    Weight from Last 3 Encounters:   17 152 lb 3.2 oz (69 kg)   10/16/17 146 lb 1.6 oz (66.3 kg)   17 144 lb 8 oz (65.5 kg)              We Performed the Following     FLU VAC, SPLIT VIRUS IM > 3 YO (QUADRIVALENT) [73673]     Vaccine Administration, Initial [23900]        Primary Care Provider Office Phone # Fax #    Kole Raymundo [phone] [address] [facility] [address]        Equal Access to Services     HILARIO STEPHENS : Hadii edgardo holliso Sojose rali, waaxda luqadaha, qaybta kaalmada adeegyada, carlo barlow. So [facility] [phone].    ATENCIÓN: Si habla español, tiene a redmond disposición servicios gratuitos de asistencia lingüística. balta al [phone].    We comply with applicable federal civil rights laws and [place] laws. We do not discriminate on the basis of race, color, national origin, age, disability, sex, sexual orientation, or gender identity.            Thank you!     Thank you for choosing [facility]  for your care. Our goal is always to provide you with excellent care. Hearing back from our patients is one way we can continue to improve our services. Please take a few minutes to complete the written survey that you may receive in the mail after your visit with us. Thank you!             Your  Updated Medication List - Protect others around you: Learn how to safely use, store and throw away your medicines at www.disposemymeds.org.          This list is accurate as of: 11/1/17 11:47 AM.  Always use your most recent med list.                   Brand Name Dispense Instructions for use Diagnosis    PRENATAL AD PO      Take  by mouth.        VENTOLIN  (90 BASE) MCG/ACT Inhaler   Generic drug:  albuterol     18 g    INHALE 2 PUFFS EVERY 6 HOURS AS NEEDED FOR SHORTNESS OF BREATH OR WHEEZING    Acute bronchitis with coexisting condition requiring prophylactic treatment

## 2017-11-03 ENCOUNTER — RADIANT APPOINTMENT (OUTPATIENT)
Dept: ULTRASOUND IMAGING | Facility: CLINIC | Age: 37
End: 2017-11-03
Attending: OBSTETRICS & GYNECOLOGY
Payer: COMMERCIAL

## 2017-11-03 PROCEDURE — 76816 OB US FOLLOW-UP PER FETUS: CPT | Performed by: OBSTETRICS & GYNECOLOGY

## 2017-11-20 ENCOUNTER — PRENATAL OFFICE VISIT (OUTPATIENT)
Dept: OBGYN | Facility: CLINIC | Age: 37
End: 2017-11-20
Payer: COMMERCIAL

## 2017-11-20 VITALS — DIASTOLIC BLOOD PRESSURE: 76 MMHG | WEIGHT: 150.9 LBS | BODY MASS INDEX: 24.36 KG/M2 | SYSTOLIC BLOOD PRESSURE: 122 MMHG

## 2017-11-20 DIAGNOSIS — Z34.83 PRENATAL CARE, SUBSEQUENT PREGNANCY IN THIRD TRIMESTER: Primary | ICD-10-CM

## 2017-11-20 PROCEDURE — 87653 STREP B DNA AMP PROBE: CPT | Performed by: OBSTETRICS & GYNECOLOGY

## 2017-11-20 PROCEDURE — 99207 ZZC PRENATAL VISIT: CPT | Performed by: OBSTETRICS & GYNECOLOGY

## 2017-11-20 NOTE — NURSING NOTE
"Chief Complaint   Patient presents with     Prenatal Care   36w4d  Shara Finley MA      Initial /76 (BP Location: Right arm, Patient Position: Chair, Cuff Size: Adult Regular)  Wt 150 lb 14.4 oz (68.4 kg)  LMP 03/09/2017 (Exact Date)  BMI 24.36 kg/m2 Estimated body mass index is 24.36 kg/(m^2) as calculated from the following:    Height as of 10/16/17: 5' 6\" (1.676 m).    Weight as of this encounter: 150 lb 14.4 oz (68.4 kg).  Medication Reconciliation: complete    "

## 2017-11-20 NOTE — MR AVS SNAPSHOT
"              After Visit Summary   11/20/2017    Delicia Henry    MRN: [id number]           Patient Information     Date Of Birth          1980        Visit Information        Provider Department      11/20/2017 3:30 PM Shreyas Keene MD [facility]        Today's Diagnoses     Prenatal care, subsequent pregnancy in third trimester    -  1       Follow-ups after your visit        Your next 10 appointments already scheduled     Nov 29, 2017  1:30 PM CST   [facility]    [address]  [facility] [id number] [phone]            Dec 04, 2017  1:15 PM CST   ESTABLISHED PRENATAL with Shreyas Keene MD   [facility]    [address]  [facility] [id number] [phone]              Who to contact     If you have questions or need follow up information about today's clinic visit or your schedule please contact [facility] directly at [phone].  Normal or non-critical lab and imaging results will be communicated to you by [employer], letter or phone within 4 business days after the clinic has received the results. If you do not hear from us within 7 days, please contact the clinic through [employer] or phone. If you have a critical or abnormal lab result, we will notify you by phone as soon as possible.  Submit refill requests through [employer] or call your pharmacy and they will forward the refill request to us. Please allow 3 business days for your refill to be completed.          Additional Information About Your Visit        MyChart Information     [employer] lets you send messages to your doctor, view your test results, renew your prescriptions, schedule appointments and more. To sign up, go to [email or web address] . Click on \"Log in\" on the left side of the screen, which will take you to the " "Welcome page. Then click on \"Sign up Now\" on the right side of the page.     You will be asked to enter the access code listed below, as well as some personal information. Please follow the directions to create your username and password.     Your access code is: [email or web address]  Expires: 2018  2:48 PM     Your access code will  in 90 days. If you need help or a new code, please call your [place] clinic or [phone].        Care EveryWhere ID     This is your Care EveryWhere ID. This could be used by other organizations to access your [place] medical records  [id number]        Your Vitals Were     Last Period BMI (Body Mass Index)                2017 (Exact Date) 24.36 kg/m2           Blood Pressure from Last 3 Encounters:   17 122/76   17 98/70   10/16/17 112/70    Weight from Last 3 Encounters:   17 150 lb 14.4 oz (68.4 kg)   17 152 lb 3.2 oz (69 kg)   10/16/17 146 lb 1.6 oz (66.3 kg)              We Performed the Following     Strep, Group B by PCR        Primary Care Provider Office Phone # Fax #    Kole Raymundo [phone] [address] [facility] [address]        Equal Access to Services     HILARIO STEPHENS : Hadii edgardo holliso [place], waaxda luqadaha, qaybta kaalmacarlo day. So [facility] [phone].    ATENCIÓN: Si habla español, tiene a redmond disposición servicios gratuitos de asistencia lingüística. Shyam al [phone].    We comply with applicable federal civil rights laws and [place] laws. We do not discriminate on the basis of race, color, national origin, age, disability, sex, sexual orientation, or gender identity.            Thank you!     Thank you for choosing [facility]  for your care. Our goal is always to provide you with excellent care. Hearing back from our patients is one way we can continue to improve our services. Please take a few minutes to complete the " written survey that you may receive in the mail after your visit with us. Thank you!             Your Updated Medication List - Protect others around you: Learn how to safely use, store and throw away your medicines at www.disposemymeds.org.          This list is accurate as of: 11/20/17  4:01 PM.  Always use your most recent med list.                   Brand Name Dispense Instructions for use Diagnosis    PRENATAL AD PO      Take  by mouth.        VENTOLIN  (90 BASE) MCG/ACT Inhaler   Generic drug:  albuterol     18 g    INHALE 2 PUFFS EVERY 6 HOURS AS NEEDED FOR SHORTNESS OF BREATH OR WHEEZING    Acute bronchitis with coexisting condition requiring prophylactic treatment

## 2017-11-21 LAB
GP B STREP DNA SPEC QL NAA+PROBE: NEGATIVE
SPECIMEN SOURCE: NORMAL

## 2017-12-04 ENCOUNTER — PRENATAL OFFICE VISIT (OUTPATIENT)
Dept: OBGYN | Facility: CLINIC | Age: 37
End: 2017-12-04
Payer: COMMERCIAL

## 2017-12-04 VITALS — SYSTOLIC BLOOD PRESSURE: 108 MMHG | WEIGHT: 150.2 LBS | BODY MASS INDEX: 24.24 KG/M2 | DIASTOLIC BLOOD PRESSURE: 74 MMHG

## 2017-12-04 DIAGNOSIS — Z34.83 PRENATAL CARE, SUBSEQUENT PREGNANCY IN THIRD TRIMESTER: Primary | ICD-10-CM

## 2017-12-04 PROCEDURE — 99207 ZZC PRENATAL VISIT: CPT | Performed by: OBSTETRICS & GYNECOLOGY

## 2017-12-04 NOTE — NURSING NOTE
"Chief Complaint   Patient presents with     Prenatal Care   38w4d  Shara Finley MA      Initial /74 (BP Location: Right arm, Patient Position: Chair, Cuff Size: Adult Regular)  Wt 150 lb 3.2 oz (68.1 kg)  LMP 03/09/2017 (Exact Date)  BMI 24.24 kg/m2 Estimated body mass index is 24.24 kg/(m^2) as calculated from the following:    Height as of 10/16/17: 5' 6\" (1.676 m).    Weight as of this encounter: 150 lb 3.2 oz (68.1 kg).  Medication Reconciliation: complete    "

## 2017-12-04 NOTE — MR AVS SNAPSHOT
"              After Visit Summary   12/4/2017    Delicia Henry    MRN: [id number]           Patient Information     Date Of Birth          1980        Visit Information        Provider Department      12/4/2017 1:15 PM Shreyas Keene MD [facility]        Today's Diagnoses     Prenatal care, subsequent pregnancy in third trimester    -  1    SGA (small for gestational age)           Follow-ups after your visit        Future tests that were ordered for you today     Open Future Orders        Priority Expected Expires Ordered    US OB Ltd One Or More Fetus FU/Repeat Routine  12/4/2018 12/4/2017            Who to contact     If you have questions or need follow up information about today's clinic visit or your schedule please contact [facility] directly at [phone].  Normal or non-critical lab and imaging results will be communicated to you by MyChart, letter or phone within 4 business days after the clinic has received the results. If you do not hear from us within 7 days, please contact the clinic through MyChart or phone. If you have a critical or abnormal lab result, we will notify you by phone as soon as possible.  Submit refill requests through [employer] or call your pharmacy and they will forward the refill request to us. Please allow 3 business days for your refill to be completed.          Additional Information About Your Visit        [employer] Information     [employer] lets you send messages to your doctor, view your test results, renew your prescriptions, schedule appointments and more. To sign up, go to [email or web address] . Click on \"Log in\" on the left side of the screen, which will take you to the Welcome page. Then click on \"Sign up Now\" on the right side of the page.     You will be asked to enter the access code listed below, as well as some personal information. Please follow the directions to create your username and password.     Your access " code is: [email or web address]  Expires: 2018  2:48 PM     Your access code will  in 90 days. If you need help or a new code, please call your [place] clinic or [phone].        Care EveryWhere ID     This is your Care EveryWhere ID. This could be used by other organizations to access your [place] medical records  [id number]        Your Vitals Were     Last Period BMI (Body Mass Index)                2017 (Exact Date) 24.24 kg/m2           Blood Pressure from Last 3 Encounters:   17 108/74   17 122/76   17 98/70    Weight from Last 3 Encounters:   17 150 lb 3.2 oz (68.1 kg)   17 150 lb 14.4 oz (68.4 kg)   17 152 lb 3.2 oz (69 kg)               Primary Care Provider Office Phone # Fax #    Kole Raymundo [phone] [address] [facility] [address]        Equal Access to Services     [facility]: Hadii aad ku hadasho Soomaali, waaxda luqadaha, qaybta kaalmada adejana, carlo pacheco . So [facility] [phone].    ATENCIÓN: Si habla español, tiene a redmond disposición servicios gratuitos de asistencia lingüística. [facility] [phone].    We comply with applicable federal civil rights laws and [place] laws. We do not discriminate on the basis of race, color, national origin, age, disability, sex, sexual orientation, or gender identity.            Thank you!     Thank you for choosing [facility]  for your care. Our goal is always to provide you with excellent care. Hearing back from our patients is one way we can continue to improve our services. Please take a few minutes to complete the written survey that you may receive in the mail after your visit with us. Thank you!             Your Updated Medication List - Protect others around you: Learn how to safely use, store and throw away your medicines at www.disposemymeds.org.          This list is accurate as of: 17  1:52 PM.  Always use your  most recent med list.                   Brand Name Dispense Instructions for use Diagnosis    PRENATAL AD PO      Take  by mouth.        VENTOLIN  (90 BASE) MCG/ACT Inhaler   Generic drug:  albuterol     18 g    INHALE 2 PUFFS EVERY 6 HOURS AS NEEDED FOR SHORTNESS OF BREATH OR WHEEZING    Acute bronchitis with coexisting condition requiring prophylactic treatment

## 2017-12-05 ENCOUNTER — NURSE TRIAGE (OUTPATIENT)
Dept: NURSING | Facility: CLINIC | Age: 37
End: 2017-12-05

## 2017-12-05 ENCOUNTER — HOSPITAL ENCOUNTER (INPATIENT)
Facility: CLINIC | Age: 37
LOS: 1 days | Discharge: HOME HEALTH | End: 2017-12-06
Attending: OBSTETRICS & GYNECOLOGY | Admitting: OBSTETRICS & GYNECOLOGY
Payer: COMMERCIAL

## 2017-12-05 LAB
ABO + RH BLD: NORMAL
ABO + RH BLD: NORMAL
BLOOD BANK CMNT PATIENT-IMP: NORMAL
HGB BLD-MCNC: 12.2 G/DL (ref 11.7–15.7)
SPECIMEN EXP DATE BLD: NORMAL
T PALLIDUM IGG+IGM SER QL: NEGATIVE

## 2017-12-05 PROCEDURE — 25000128 H RX IP 250 OP 636

## 2017-12-05 PROCEDURE — 12000029 ZZH R&B OB INTERMEDIATE

## 2017-12-05 PROCEDURE — 25000128 H RX IP 250 OP 636: Performed by: OBSTETRICS & GYNECOLOGY

## 2017-12-05 PROCEDURE — 86900 BLOOD TYPING SEROLOGIC ABO: CPT | Performed by: OBSTETRICS & GYNECOLOGY

## 2017-12-05 PROCEDURE — 25000132 ZZH RX MED GY IP 250 OP 250 PS 637: Performed by: OBSTETRICS & GYNECOLOGY

## 2017-12-05 PROCEDURE — 86780 TREPONEMA PALLIDUM: CPT | Performed by: OBSTETRICS & GYNECOLOGY

## 2017-12-05 PROCEDURE — 25000125 ZZHC RX 250: Performed by: OBSTETRICS & GYNECOLOGY

## 2017-12-05 PROCEDURE — 72200001 ZZH LABOR CARE VAGINAL DELIVERY SINGLE

## 2017-12-05 PROCEDURE — 85018 HEMOGLOBIN: CPT | Performed by: OBSTETRICS & GYNECOLOGY

## 2017-12-05 PROCEDURE — 59400 OBSTETRICAL CARE: CPT | Performed by: OBSTETRICS & GYNECOLOGY

## 2017-12-05 PROCEDURE — 36415 COLL VENOUS BLD VENIPUNCTURE: CPT | Performed by: OBSTETRICS & GYNECOLOGY

## 2017-12-05 PROCEDURE — 86901 BLOOD TYPING SEROLOGIC RH(D): CPT | Performed by: OBSTETRICS & GYNECOLOGY

## 2017-12-05 PROCEDURE — 25000125 ZZHC RX 250

## 2017-12-05 RX ORDER — AMOXICILLIN 250 MG
2 CAPSULE ORAL 2 TIMES DAILY PRN
Status: DISCONTINUED | OUTPATIENT
Start: 2017-12-05 | End: 2017-12-06 | Stop reason: HOSPADM

## 2017-12-05 RX ORDER — OXYTOCIN/0.9 % SODIUM CHLORIDE 30/500 ML
PLASTIC BAG, INJECTION (ML) INTRAVENOUS
Status: COMPLETED
Start: 2017-12-05 | End: 2017-12-05

## 2017-12-05 RX ORDER — BISACODYL 10 MG
10 SUPPOSITORY, RECTAL RECTAL DAILY PRN
Status: DISCONTINUED | OUTPATIENT
Start: 2017-12-07 | End: 2017-12-06 | Stop reason: HOSPADM

## 2017-12-05 RX ORDER — METHYLERGONOVINE MALEATE 0.2 MG/ML
200 INJECTION INTRAVENOUS
Status: DISCONTINUED | OUTPATIENT
Start: 2017-12-05 | End: 2017-12-05

## 2017-12-05 RX ORDER — LIDOCAINE HYDROCHLORIDE 10 MG/ML
INJECTION, SOLUTION INFILTRATION; PERINEURAL
Status: DISCONTINUED
Start: 2017-12-05 | End: 2017-12-05 | Stop reason: HOSPADM

## 2017-12-05 RX ORDER — NALOXONE HYDROCHLORIDE 0.4 MG/ML
.1-.4 INJECTION, SOLUTION INTRAMUSCULAR; INTRAVENOUS; SUBCUTANEOUS
Status: DISCONTINUED | OUTPATIENT
Start: 2017-12-05 | End: 2017-12-06 | Stop reason: HOSPADM

## 2017-12-05 RX ORDER — SODIUM CHLORIDE, SODIUM LACTATE, POTASSIUM CHLORIDE, CALCIUM CHLORIDE 600; 310; 30; 20 MG/100ML; MG/100ML; MG/100ML; MG/100ML
INJECTION, SOLUTION INTRAVENOUS CONTINUOUS
Status: DISCONTINUED | OUTPATIENT
Start: 2017-12-05 | End: 2017-12-05

## 2017-12-05 RX ORDER — NALOXONE HYDROCHLORIDE 0.4 MG/ML
.1-.4 INJECTION, SOLUTION INTRAMUSCULAR; INTRAVENOUS; SUBCUTANEOUS
Status: DISCONTINUED | OUTPATIENT
Start: 2017-12-05 | End: 2017-12-05

## 2017-12-05 RX ORDER — PENICILLIN G POTASSIUM 5000000 [IU]/1
5 INJECTION, POWDER, FOR SOLUTION INTRAMUSCULAR; INTRAVENOUS ONCE
Status: COMPLETED | OUTPATIENT
Start: 2017-12-05 | End: 2017-12-05

## 2017-12-05 RX ORDER — OXYTOCIN 10 [USP'U]/ML
10 INJECTION, SOLUTION INTRAMUSCULAR; INTRAVENOUS
Status: DISCONTINUED | OUTPATIENT
Start: 2017-12-05 | End: 2017-12-06 | Stop reason: HOSPADM

## 2017-12-05 RX ORDER — AMOXICILLIN 250 MG
1 CAPSULE ORAL 2 TIMES DAILY PRN
Status: DISCONTINUED | OUTPATIENT
Start: 2017-12-05 | End: 2017-12-06 | Stop reason: HOSPADM

## 2017-12-05 RX ORDER — IBUPROFEN 800 MG/1
800 TABLET, FILM COATED ORAL
Status: COMPLETED | OUTPATIENT
Start: 2017-12-05 | End: 2017-12-05

## 2017-12-05 RX ORDER — LANOLIN 100 %
OINTMENT (GRAM) TOPICAL
Status: DISCONTINUED | OUTPATIENT
Start: 2017-12-05 | End: 2017-12-06 | Stop reason: HOSPADM

## 2017-12-05 RX ORDER — MISOPROSTOL 200 UG/1
400 TABLET ORAL
Status: DISCONTINUED | OUTPATIENT
Start: 2017-12-05 | End: 2017-12-06 | Stop reason: HOSPADM

## 2017-12-05 RX ORDER — ACETAMINOPHEN 325 MG/1
650 TABLET ORAL EVERY 4 HOURS PRN
Status: DISCONTINUED | OUTPATIENT
Start: 2017-12-05 | End: 2017-12-05

## 2017-12-05 RX ORDER — OXYTOCIN 10 [USP'U]/ML
10 INJECTION, SOLUTION INTRAMUSCULAR; INTRAVENOUS
Status: DISCONTINUED | OUTPATIENT
Start: 2017-12-05 | End: 2017-12-05

## 2017-12-05 RX ORDER — OXYTOCIN/0.9 % SODIUM CHLORIDE 30/500 ML
100-340 PLASTIC BAG, INJECTION (ML) INTRAVENOUS CONTINUOUS PRN
Status: COMPLETED | OUTPATIENT
Start: 2017-12-05 | End: 2017-12-05

## 2017-12-05 RX ORDER — HYDROCORTISONE 2.5 %
CREAM (GRAM) TOPICAL 3 TIMES DAILY PRN
Status: DISCONTINUED | OUTPATIENT
Start: 2017-12-05 | End: 2017-12-06 | Stop reason: HOSPADM

## 2017-12-05 RX ORDER — OXYTOCIN/0.9 % SODIUM CHLORIDE 30/500 ML
100 PLASTIC BAG, INJECTION (ML) INTRAVENOUS CONTINUOUS
Status: DISCONTINUED | OUTPATIENT
Start: 2017-12-05 | End: 2017-12-06 | Stop reason: HOSPADM

## 2017-12-05 RX ORDER — METHYLERGONOVINE MALEATE 0.2 MG/ML
200 INJECTION INTRAVENOUS
Status: DISCONTINUED | OUTPATIENT
Start: 2017-12-05 | End: 2017-12-06 | Stop reason: HOSPADM

## 2017-12-05 RX ORDER — ONDANSETRON 2 MG/ML
4 INJECTION INTRAMUSCULAR; INTRAVENOUS EVERY 6 HOURS PRN
Status: DISCONTINUED | OUTPATIENT
Start: 2017-12-05 | End: 2017-12-05

## 2017-12-05 RX ORDER — ALBUTEROL SULFATE 90 UG/1
1-2 AEROSOL, METERED RESPIRATORY (INHALATION) EVERY 6 HOURS PRN
Status: DISCONTINUED | OUTPATIENT
Start: 2017-12-05 | End: 2017-12-06 | Stop reason: HOSPADM

## 2017-12-05 RX ORDER — OXYCODONE AND ACETAMINOPHEN 5; 325 MG/1; MG/1
1 TABLET ORAL
Status: DISCONTINUED | OUTPATIENT
Start: 2017-12-05 | End: 2017-12-05

## 2017-12-05 RX ORDER — IBUPROFEN 800 MG/1
800 TABLET, FILM COATED ORAL EVERY 6 HOURS PRN
Status: DISCONTINUED | OUTPATIENT
Start: 2017-12-05 | End: 2017-12-06 | Stop reason: HOSPADM

## 2017-12-05 RX ORDER — OXYTOCIN/0.9 % SODIUM CHLORIDE 30/500 ML
340 PLASTIC BAG, INJECTION (ML) INTRAVENOUS CONTINUOUS PRN
Status: DISCONTINUED | OUTPATIENT
Start: 2017-12-05 | End: 2017-12-06 | Stop reason: HOSPADM

## 2017-12-05 RX ORDER — ACETAMINOPHEN 325 MG/1
650 TABLET ORAL EVERY 4 HOURS PRN
Status: DISCONTINUED | OUTPATIENT
Start: 2017-12-05 | End: 2017-12-06 | Stop reason: HOSPADM

## 2017-12-05 RX ORDER — FENTANYL CITRATE 50 UG/ML
50-100 INJECTION, SOLUTION INTRAMUSCULAR; INTRAVENOUS
Status: DISCONTINUED | OUTPATIENT
Start: 2017-12-05 | End: 2017-12-05

## 2017-12-05 RX ORDER — FENTANYL CITRATE 50 UG/ML
INJECTION, SOLUTION INTRAMUSCULAR; INTRAVENOUS
Status: COMPLETED
Start: 2017-12-05 | End: 2017-12-05

## 2017-12-05 RX ORDER — CARBOPROST TROMETHAMINE 250 UG/ML
250 INJECTION, SOLUTION INTRAMUSCULAR
Status: DISCONTINUED | OUTPATIENT
Start: 2017-12-05 | End: 2017-12-05

## 2017-12-05 RX ORDER — IBUPROFEN 800 MG/1
800 TABLET, FILM COATED ORAL EVERY 6 HOURS PRN
Status: DISCONTINUED | OUTPATIENT
Start: 2017-12-05 | End: 2017-12-05

## 2017-12-05 RX ADMIN — OXYTOCIN-SODIUM CHLORIDE 0.9% IV SOLN 30 UNIT/500ML 340 ML/HR: 30-0.9/5 SOLUTION at 07:05

## 2017-12-05 RX ADMIN — Medication 340 ML/HR: at 07:05

## 2017-12-05 RX ADMIN — SODIUM CHLORIDE, POTASSIUM CHLORIDE, SODIUM LACTATE AND CALCIUM CHLORIDE: 600; 310; 30; 20 INJECTION, SOLUTION INTRAVENOUS at 06:15

## 2017-12-05 RX ADMIN — IBUPROFEN 800 MG: 800 TABLET ORAL at 14:15

## 2017-12-05 RX ADMIN — PENICILLIN G POTASSIUM 5 MILLION UNITS: 5000000 POWDER, FOR SOLUTION INTRAMUSCULAR; INTRAPLEURAL; INTRATHECAL; INTRAVENOUS at 06:23

## 2017-12-05 RX ADMIN — IBUPROFEN 800 MG: 800 TABLET ORAL at 20:52

## 2017-12-05 RX ADMIN — SENNOSIDES AND DOCUSATE SODIUM 1 TABLET: 8.6; 5 TABLET ORAL at 20:53

## 2017-12-05 RX ADMIN — LIDOCAINE HYDROCHLORIDE 10 ML: 10 INJECTION, SOLUTION INFILTRATION; PERINEURAL at 06:57

## 2017-12-05 RX ADMIN — FENTANYL CITRATE 50 MCG: 50 INJECTION, SOLUTION INTRAMUSCULAR; INTRAVENOUS at 06:20

## 2017-12-05 RX ADMIN — IBUPROFEN 800 MG: 800 TABLET, FILM COATED ORAL at 07:41

## 2017-12-05 RX ADMIN — ACETAMINOPHEN 650 MG: 325 TABLET, FILM COATED ORAL at 23:42

## 2017-12-05 NOTE — TELEPHONE ENCOUNTER
"  Reason for Disposition    [1] History of prior delivery (multipara) AND [2] contractions < 10 minutes apart AND [3] present 1 hour    Additional Information    Negative: Passed out (i.e., lost consciousness, collapsed and was not responding)    Negative: Shock suspected (e.g., cold/pale/clammy skin, too weak to stand, low BP, rapid pulse)    Negative: Difficult to awaken or acting confused  (e.g., disoriented, slurred speech)    Negative: [1] SEVERE abdominal pain (e.g., excruciating) AND [2] constant AND [3] present > 1 hour    Negative: Severe bleeding (e.g., continuous red blood from vagina, or large blood clots)    Negative: Umbilical cord hanging out of the vagina (shiny, white, curled appearance, \"like telephone cord\")    Negative: Uncontrollable urge to push (i.e., feels like baby is coming out now)    Negative: Can see baby    Negative: Sounds like a life-threatening emergency to the triager    Negative: Pregnant < 37 weeks (i.e., )    Negative: [1] Uncertain delivery date AND [2] possibly pregnant < 37 weeks (i.e., )    Negative: [1] First baby (primipara) AND [2] contractions < 6 minutes apart  AND [3] present 2 hours    Protocols used: PREGNANCY - LABOR-ADULT-  Patient states she is in labor and is headed over to labor and delivery. Patient states her contractions are 5 minutes apart.  "

## 2017-12-05 NOTE — PLAN OF CARE
Problem: Patient Care Overview  Goal: Plan of Care/Patient Progress Review  Outcome: Improving  Pt VSS,. Up ad quique. Pain controled with PRN Ibuprofen. Formula bottle feeding  well. Family at bedside and supportive. Meeting expected goals.

## 2017-12-05 NOTE — IP AVS SNAPSHOT
[facility]    [address]    [facility] [address]    Phone:  [phone]    Fax:  [phone]                                       After Visit Summary   12/5/2017    Delicia Henry    MRN: [id number]           After Visit Summary Signature Page     I have received my discharge instructions, and my questions have been answered. I have discussed any challenges I see with this plan with the nurse or doctor.    ..........................................................................................................................................  Patient/Patient Representative Signature      ..........................................................................................................................................  Patient Representative Print Name and Relationship to Patient    ..................................................               ................................................  Date                                            Time    ..........................................................................................................................................  Reviewed by Signature/Title    ...................................................              ..............................................  Date                                                            Time

## 2017-12-05 NOTE — H&P
No significant change in general health status based on examination of the patient, review of Nursing Admission Database and prenatal record.    EFW: 7lb      @ 38w5d.  Admitted at 10cm in Active labor. Intact. No epidural.  Plan Vaginal Delivery.     Lesly Magana, DO  OB/GYN

## 2017-12-05 NOTE — PLAN OF CARE
at 38.5 weeks presented to LD after calling unit stating she has a history of going fast with all her deliveries. Arrived at 0600, SVE immediately done and 10/+1, intact BOW and scant bloody show. Dr. Magana was notified and she was on her way already. IV started and IV Fentanyl and PCN given for +GBS in urine. UC's every 2.5 min.  moderate variability with accelerations and VD's with UC's. Dr. Magana arrived and patient set up to push, delivered at 0702 girl, Apgars 9, 10.  Patient started neil at 0445. Arrived and delivered in a little over an hour. Drug screen deferred with patient history of fast labors and with conversation and ok with Dr. Magana.  Beside report given to Sarah Spivey RN who assumed cares.

## 2017-12-05 NOTE — IP AVS SNAPSHOT
MRN:[id number]                      After Visit Summary   12/5/2017    Delicia Henry    MRN: [id number]           Thank you!     Thank you for choosing [facility] for your care. Our goal is always to provide you with excellent care. Hearing back from our patients is one way we can continue to improve our services. Please take a few minutes to complete the written survey that you may receive in the mail after you visit. If you would like to speak to someone directly about your visit please contact Patient Relations at [phone]. Thank you!          Patient Information     Date Of Birth          1980        About your hospital stay     You were admitted on:  December 5, 2017 You last received care in the:  [facility] Postpartum    You were discharged on:  December 6, 2017       Who to Call     For medical emergencies, please call 911.  For non-urgent questions about your medical care, please call your primary care provider or clinic, [phone]          Attending Provider     Provider Lesly Reece DO OB/Gyn    Shreyas Keene MD OB/Gyn       Primary Care Provider Office Phone # Fax #    Kole Raymundo [phone]      Further instructions from your care team       Postpartum Vaginal Delivery Instructions  Please make an appointment to follow up in clinic with your primary OB in 6 weeks.    [facility]:  [phone]  [facility] Lactation Consultant:  [phone]    Activity       Ask family and friends for help when you need it.    Do not place anything in your vagina for 6 weeks.    You are not restricted on other activities, but take it easy for a few weeks to allow your body to recover from delivery.  You are able to do any activities you feel up to that point.    No driving until you have stopped taking your pain medications (usually two weeks after delivery).     Call your health care provider if you  have any of these symptoms:       Increased pain, swelling, redness, or fluid around your stiches from an episiotomy or perineal tear.    A fever above 100.4 F (38 C) with or without chills when placing a thermometer under your tongue.    You soak a sanitary pad with blood within 1 hour, or you see blood clots larger than a golf ball.    Bleeding that lasts more than 6 weeks.    Vaginal discharge that smells bad.    Severe pain, cramping or tenderness in your lower belly area.    A need to urinate more frequently (use the toilet more often), more urgently (use the toilet very quickly), or it burns when you urinate.    Nausea and vomiting.    Redness, swelling or pain around a vein in your leg.    Problems breastfeeding or a red or painful area on your breast.    Chest pain and cough or are gasping for air.    Problems coping with sadness, anxiety, or depression.  If you have any concerns about hurting yourself or the baby, call your provider immediately.     You have questions or concerns after you return home.     Keep your hands clean:  Always wash your hands before touching your perineal area and stitches.  This helps reduce your risk of infection.  If your hands aren't dirty, you may use an alcohol hand-rub to clean your hands. Keep your nails clean and short.      Pending Results     Date and Time Order Name Status Description    12/6/2017 0600 Rh Immune Globulin Study In process             Statement of Approval     Ordered          12/06/17 1200  I have reviewed and agree with all the recommendations and orders detailed in this document.  EFFECTIVE NOW     Approved and electronically signed by:  Shreyas Keene MD             Admission Information     Date & Time Provider Department Dept. Phone    12/5/2017 Shreyas Keene MD [facility] Postpartum [phone]      Your Vitals Were     Blood Pressure Pulse Temperature Respirations Height Weight    110/81 70 98  F (36.7  C) (Oral) 16 1.702 m (5'  "7\") 68.1 kg (150 lb 3.2 oz)    Last Period BMI (Body Mass Index)                2017 (Exact Date) 23.52 kg/m2          Twylah Information     Twylah lets you send messages to your doctor, view your test results, renew your prescriptions, schedule appointments and more. To sign up, go to [email or web address] . Click on \"Log in\" on the left side of the screen, which will take you to the Welcome page. Then click on \"Sign up Now\" on the right side of the page.     You will be asked to enter the access code listed below, as well as some personal information. Please follow the directions to create your username and password.     Your access code is: [email or web address]  Expires: 2018  2:48 PM     Your access code will  in 90 days. If you need help or a new code, please call your [place] clinic or [phone].        Care EveryWhere ID     This is your Care EveryWhere ID. This could be used by other organizations to access your [place] medical records  [id number]        Equal Access to Services     HILARIO STEPHENS : Hadchapin Cee, cielo yao, romy chavira, carlo pacheco . So [facility] [phone].    ATENCIÓN: Si habla español, tiene a redmond disposición servicios gratuitos de asistencia lingüística. Shyam al [phone].    We comply with applicable federal civil rights laws and [place] laws. We do not discriminate on the basis of race, color, national origin, age, disability, sex, sexual orientation, or gender identity.               Review of your medicines      UNREVIEWED medicines. Ask your doctor about these medicines        Dose / Directions    PRENATAL AD PO        Take  by mouth.   Refills:  0         CONTINUE these medicines which have NOT CHANGED        Dose / Directions    VENTOLIN  (90 BASE) MCG/ACT Inhaler   Used for:  Acute bronchitis with coexisting condition requiring prophylactic treatment   Generic drug:  albuterol        " INHALE 2 PUFFS EVERY 6 HOURS AS NEEDED FOR SHORTNESS OF BREATH OR WHEEZING   Quantity:  18 g   Refills:  0                Protect others around you: Learn how to safely use, store and throw away your medicines at www.disposemymeds.org.             Medication List: This is a list of all your medications and when to take them. Check marks below indicate your daily home schedule. Keep this list as a reference.      Medications           Morning Afternoon Evening Bedtime As Needed    PRENATAL AD PO   Take  by mouth.                                   VENTOLIN  (90 BASE) MCG/ACT Inhaler   INHALE 2 PUFFS EVERY 6 HOURS AS NEEDED FOR SHORTNESS OF BREATH OR WHEEZING   Generic drug:  albuterol

## 2017-12-05 NOTE — PLAN OF CARE
Data: Delicia Henry transferred to [facility] via wheelchair at 1015. Baby transferred via parent's arms.  Action: Receiving unit notified of transfer: Yes. Patient and family notified of room change. Report given to TAYLOR Majano at 1020. Belongings sent to receiving unit. Accompanied by Registered Nurse. Oriented patient to surroundings. Call light within reach. ID bands double-checked with receiving RN.  Response: Patient tolerated transfer and is stable.

## 2017-12-05 NOTE — L&D DELIVERY NOTE
Delivery Summary    Delicia Henry MRN# [id number]   Age: 37 year old YOB: 1980     ASSESSMENT & PLAN: Normal spontaneous vaginal delivery; no lacerations    Gestational age:  38w5d    Patient labored to +2 station. SROM for clear fluid. Draped in standard manner in dorsal lithotomy position.   delivered by spontaneous vaginal delivery without difficulty.   Nuchal cord was present x1. 's mouth and nose bulb suctioned after delivery. 30 units/500mL of Pitocin initiated per IV.  Cord doubly clamped and cut after 60 second delay.  then transported to maternal abdomen, with APGARS of 9 and 10 at 1 and 5 minutes. Please see delivery summary for  weight.   Perineum and vagina inspected with no lacerations idenitified. Placenta delivered spontaneously intact. Lower uterine segment evacuated of clots and fundus firm. QBL 25mL. Sponges and counts were correct x 2.            Labor Event Times    Labor onset date:  17 Onset time:   4:45 AM   Dilation complete date:  17 Complete time:   6:15 AM   Start pushing date/time:  2017 0655            Labor Events     labor?:  No    steroids:  None   Labor Type:  Spontaneous   Predominate monitoring during 1st stage:  continuous electronic fetal monitoring      Rupture date/time:     Rupture type:  Spontaneous rupture of membranes occuring during spontaneous labor or augmentation      Delivery/Placenta Date and Time    Delivery Date:  17 Delivery Time:   7:02 AM   Placenta Date/Time:  2017  7:06 AM   Oxytocin given at the time of delivery:  after delivery of baby      Vaginal Counts    Initial count performed by 2 team members:   Two Team Members   TAYLOR Medina Dr.          Needles Suture Charlotte Sponges Instruments   Initial counts 2  5    Added to count       Final counts 2  5       Placed during labor Accounted for at the end of labor   No NA   No NA   No NA      Final count  performed by 2 team members:   Two Team Members   TAYLOR Medina Dr.         Final count correct?:  Yes         Apgars    Living status:  Living    1 Minute 5 Minute 10 Minute 15 Minute 20 Minute   Skin color: 1  2       Heart rate: 2  2       Reflex irritability: 2  2       Muscle tone: 2  2       Respiratory effort: 2  2       Total: 9  10          Apgars assigned by:  KERVIN COSTELLO RN      Cord    Vessels:  3 Vessels Complications:  Nuchal   Cord Blood Disposition:  Lab Gases Sent?:  No         [place] Resuscitation    Methods:  None         Skin to Skin and Feeding Plan    Skin to skin initiation date/time: 17 0702   Skin to skin with:  Mother   Skin to skin end date/time:        Labor Events and Shoulder Dystocia    Shoulder dystocia present?:  Neg            Delivery (Maternal) (Provider to Complete) (577992)    Episiotomy:  None   Perineal lacerations:  None    Vaginal laceration?:  No    Cervical laceration?:  No          Mother's Information  Mother: Delicia Henry #[id number]    Start of Mother's Information     IO Blood Loss  17 0445 - 17 0728    Mom's I/O Activity            End of Mother's Information  Mother: Delicia Henry #[id number]            Delivery - Provider to Complete (358036)    Delivering clinician:  NEYMAR MAGANA   Delivery Type (Choose the 1 that will go to the Birth History):  Vaginal, Spontaneous Delivery                           Placenta    Delayed Cord Clamping:  Done   Date/Time:  2017  7:06 AM   Removal:  Expressed   Disposition:  Hospital disposal      Anesthesia    Method:  Local         Presentation and Position    Presentation:  Vertex    Occiput Anterior                    Neymar Magana DO

## 2017-12-06 VITALS
TEMPERATURE: 98 F | WEIGHT: 150.2 LBS | SYSTOLIC BLOOD PRESSURE: 110 MMHG | HEART RATE: 70 BPM | DIASTOLIC BLOOD PRESSURE: 81 MMHG | BODY MASS INDEX: 23.57 KG/M2 | HEIGHT: 67 IN | RESPIRATION RATE: 16 BRPM

## 2017-12-06 LAB
ABO + RH BLD: NORMAL
ABO + RH BLD: NORMAL
BLOOD BANK CMNT PATIENT-IMP: NORMAL
DATE RH IMM GL GVN: NORMAL
FETAL CELL SCN BLD QL ROSETTE: NORMAL
RH IG VIALS RECOM PATIENT: NORMAL

## 2017-12-06 PROCEDURE — 36415 COLL VENOUS BLD VENIPUNCTURE: CPT | Performed by: OBSTETRICS & GYNECOLOGY

## 2017-12-06 PROCEDURE — 25000132 ZZH RX MED GY IP 250 OP 250 PS 637: Performed by: OBSTETRICS & GYNECOLOGY

## 2017-12-06 PROCEDURE — 86900 BLOOD TYPING SEROLOGIC ABO: CPT | Performed by: OBSTETRICS & GYNECOLOGY

## 2017-12-06 PROCEDURE — 25000128 H RX IP 250 OP 636: Performed by: OBSTETRICS & GYNECOLOGY

## 2017-12-06 PROCEDURE — 85461 HEMOGLOBIN FETAL: CPT | Performed by: OBSTETRICS & GYNECOLOGY

## 2017-12-06 PROCEDURE — 86901 BLOOD TYPING SEROLOGIC RH(D): CPT | Performed by: OBSTETRICS & GYNECOLOGY

## 2017-12-06 RX ADMIN — IBUPROFEN 800 MG: 800 TABLET ORAL at 03:16

## 2017-12-06 RX ADMIN — IBUPROFEN 800 MG: 800 TABLET ORAL at 09:58

## 2017-12-06 RX ADMIN — HUMAN RHO(D) IMMUNE GLOBULIN 300 MCG: 300 INJECTION, SOLUTION INTRAMUSCULAR at 11:04

## 2017-12-06 NOTE — PROGRESS NOTES
Patient discharged at 1315 with  and . She was stable at the time of transfer and ambulated to the exit with staff escort. AVS reviewed and all questions answered. [facility] referral made for early d/c. All belongings sent with patient.

## 2017-12-06 NOTE — PLAN OF CARE
Problem: Postpartum (Vaginal Delivery) (Adult,Obstetrics,Pediatric)  Goal: Signs and Symptoms of Listed Potential Problems Will be Absent, Minimized or Managed (Postpartum)  Signs and symptoms of listed potential problems will be absent, minimized or managed by discharge/transition of care (reference Postpartum (Vaginal Delivery) (Adult,Obstetrics,Pediatric) CPG).   Did c/o cramping rated 5 when she stands. Medicated with Motrin and Tylenol. Small rubra flow no clots. Breasts are slightly tender. Confident with infant cares.

## 2017-12-06 NOTE — PLAN OF CARE
Problem: Patient Care Overview  Goal: Plan of Care/Patient Progress Review  Outcome: Improving  Patient doing well. VS within normal limits. Pt eating and drinking. Positive attachment behaviors observed with infant.

## 2017-12-06 NOTE — PROGRESS NOTES
[facility] Obstetrics Post-Partum Progress Note          Assessment and Plan:    Assessment:   Post-partum day #1  Normal spontaneous vaginal delivery  L&D complications: None      Doing well. Anxious for discharge      Plan:   Ambulation encouraged, Discharge.           Interval History:   Doing well.  Pain is well-controlled.  No fevers.  No history of foul-smelling vaginal discharge.  Good appetite.  Denies chest pain, shortness of breath, nausea or vomiting.  Vaginal bleeding is similar to a heavy menstrual flow.  Ambulatory.  Breastfeeding well.            Significant Problems:    None          Review of Systems:    The patient denies any chest pain, shortness of breath, excessive pain, fever, chills, purulent drainage from the wound, nausea or vomiting.          Medications:   All medications related to the patient's surgery have been reviewed          Physical Exam:     All vitals stable  Patient Vitals for the past 12 hrs:   BP Temp Heart Rate Resp   12/06/17 0946 110/81 98  F (36.7  C) 72 16     Uterine fundus is firm, non-tender and at the level of the umbilicus          Data:     All laboratory data related to this surgery reviewed  Hemoglobin   Date Value Ref Range Status   12/05/2017 12.2 11.7 - 15.7 g/dL Final   09/18/2017 12.0 11.7 - 15.7 g/dL Final   07/05/2017 12.5 11.7 - 15.7 g/dL Final   01/22/2014 11.1 (L) 11.7 - 15.7 g/dL Final   11/22/2013 11.7 11.7 - 15.7 g/dL Final     Comment:     Results confirmed by repeat test     No imaging studies have been ordered  Shreyas Keene MD, MD

## 2017-12-06 NOTE — DISCHARGE INSTRUCTIONS
Postpartum Vaginal Delivery Instructions  Please make an appointment to follow up in clinic with your primary OB in 6 weeks.    [facility] Care:  [phone]  [facility] Lactation Consultant:  [phone]    Activity       Ask family and friends for help when you need it.    Do not place anything in your vagina for 6 weeks.    You are not restricted on other activities, but take it easy for a few weeks to allow your body to recover from delivery.  You are able to do any activities you feel up to that point.    No driving until you have stopped taking your pain medications (usually two weeks after delivery).     Call your health care provider if you have any of these symptoms:       Increased pain, swelling, redness, or fluid around your stiches from an episiotomy or perineal tear.    A fever above 100.4 F (38 C) with or without chills when placing a thermometer under your tongue.    You soak a sanitary pad with blood within 1 hour, or you see blood clots larger than a golf ball.    Bleeding that lasts more than 6 weeks.    Vaginal discharge that smells bad.    Severe pain, cramping or tenderness in your lower belly area.    A need to urinate more frequently (use the toilet more often), more urgently (use the toilet very quickly), or it burns when you urinate.    Nausea and vomiting.    Redness, swelling or pain around a vein in your leg.    Problems breastfeeding or a red or painful area on your breast.    Chest pain and cough or are gasping for air.    Problems coping with sadness, anxiety, or depression.  If you have any concerns about hurting yourself or the baby, call your provider immediately.     You have questions or concerns after you return home.     Keep your hands clean:  Always wash your hands before touching your perineal area and stitches.  This helps reduce your risk of infection.  If your hands aren't dirty, you may use an alcohol hand-rub to clean your hands. Keep your nails clean and  short.

## 2017-12-06 NOTE — PLAN OF CARE
Patient is discharging in a stable condition to home with baby and .  Discharge instructions given and reviewed.  OTC pain medications discussed.  Home care referral faxed; phone number provided.  Plan is to follow up in clinic in 6 weeks.  She has no further questions at this time.  ID bands were verified.

## 2018-01-17 ENCOUNTER — PRENATAL OFFICE VISIT (OUTPATIENT)
Dept: OBGYN | Facility: CLINIC | Age: 38
End: 2018-01-17
Payer: COMMERCIAL

## 2018-01-17 ENCOUNTER — ALLIED HEALTH/NURSE VISIT (OUTPATIENT)
Dept: NURSING | Facility: CLINIC | Age: 38
End: 2018-01-17
Payer: COMMERCIAL

## 2018-01-17 VITALS — DIASTOLIC BLOOD PRESSURE: 70 MMHG | SYSTOLIC BLOOD PRESSURE: 110 MMHG | BODY MASS INDEX: 22.08 KG/M2 | WEIGHT: 141 LBS

## 2018-01-17 DIAGNOSIS — Z30.9 CONTRACEPTIVE MANAGEMENT: Primary | ICD-10-CM

## 2018-01-17 DIAGNOSIS — Z30.09 ENCOUNTER FOR OTHER GENERAL COUNSELING OR ADVICE ON CONTRACEPTION: ICD-10-CM

## 2018-01-17 PROCEDURE — 96372 THER/PROPH/DIAG INJ SC/IM: CPT

## 2018-01-17 PROCEDURE — 99207 ZZC POST PARTUM EXAM: CPT | Performed by: OBSTETRICS & GYNECOLOGY

## 2018-01-17 PROCEDURE — 99207 ZZC NO CHARGE NURSE ONLY: CPT

## 2018-01-17 RX ORDER — MEDROXYPROGESTERONE ACETATE 150 MG/ML
150 INJECTION, SUSPENSION INTRAMUSCULAR
Qty: 1 ML | Refills: 0 | OUTPATIENT
Start: 2018-01-17 | End: 2020-02-04

## 2018-01-17 NOTE — PROGRESS NOTES
SUBJECTIVE:  Delicia Henry,  is here for a postpartum visit.  She had a  on 17 delivering a healthy baby boy, named Xiomara weighing 6 lbs 9 oz at term.      HPI:  Would like to discuss birth control. Has been on Depo Provera in the past.  States she is concerned about the impact of Depo on weight gain now that she is older.  Was also on oral contraceptive pills (OCPs) in the past and when stopped, had significant hair loss. Patient very concerned about risk of hair loss.      Last PHQ-9 score on record=   PHQ-9 SCORE 3/24/2014   Total Score 3     No flowsheet data found.    Delivery complications:  No  Breast feeding:  No  Bladder problems:  No  Bowel problems/hemorrhoids:  No  Episiotomy/laceration/incision healed? No  Vaginal flow:  None  [place]:  No  Contraception: none wants to talk about today  Emotional adjustment:  doing well, happy and tired  Back to work:  Self employed    12 point review of systems negative other than symptoms noted below.    OBJECTIVE:  Vitals: /70 (BP Location: Right arm, Patient Position: Chair, Cuff Size: Adult Regular)  Wt 141 lb (64 kg)  LMP 2017 (Exact Date)  BMI 22.08 kg/m2  BMI= Body mass index is 22.08 kg/(m^2).  General - pleasant female in no acute distress.  Breast -  deferred, no complaints  Abdomen - No incision  Pelvic - deferred, no lacerations at time of deliery  Adnexae: no masses or tenderness.  Rectovaginal - deferred.    ASSESSMENT:    ICD-10-CM    1. Single liveborn infant delivered vaginally Z38.00    2. Encounter for other general counseling or advice on contraception Z30.09        PLAN:  May resume normal activities without restrictions.  Pap smear was not done today.  Pap 17 negative, pap negative.     Full counseling was provided, and all questions were answered.     All methods of birth control including oral contraceptive pills, patches, vaginal ring, implant, depo provera shot, IUD (hormonal and copper),  barrier methods discussed including risks, benefits, and alternatives to each. She is choosing to proceed with Depot provera today. Schedule for Mirena IUD.     Return to clinic for progesterone IUD (Mirena).    Annual visit in 1 year.  Pap due in 2022.     Lesly Magana, DO

## 2018-01-17 NOTE — MR AVS SNAPSHOT
"              After Visit Summary   2018    Delicia Henry    MRN: [id number]           Patient Information     Date Of Birth          1980        Visit Information        Provider Department      2018 2:00 PM Lesly Magana DO [facility]        Today's Diagnoses     Single liveborn infant delivered vaginally    -  1    Encounter for other general counseling or advice on contraception           Follow-ups after your visit        Who to contact     If you have questions or need follow up information about today's clinic visit or your schedule please contact [facility] directly at [phone].  Normal or non-critical lab and imaging results will be communicated to you by Conspirehart, letter or phone within 4 business days after the clinic has received the results. If you do not hear from us within 7 days, please contact the clinic through Conspirehart or phone. If you have a critical or abnormal lab result, we will notify you by phone as soon as possible.  Submit refill requests through GIVVER or call your pharmacy and they will forward the refill request to us. Please allow 3 business days for your refill to be completed.          Additional Information About Your Visit        MyChart Information     GIVVER lets you send messages to your doctor, view your test results, renew your prescriptions, schedule appointments and more. To sign up, go to [email or web address] . Click on \"Log in\" on the left side of the screen, which will take you to the Welcome page. Then click on \"Sign up Now\" on the right side of the page.     You will be asked to enter the access code listed below, as well as some personal information. Please follow the directions to create your username and password.     Your access code is: [email or web address]  Expires: 2018  3:10 PM     Your access code will  in 90 days. If you need help or a new code, please call your [facility] or " [phone].        Care EveryWhere ID     This is your Care EveryWhere ID. This could be used by other organizations to access your [place] medical records  [id number]        Your Vitals Were     Last Period BMI (Body Mass Index)                03/09/2017 (Exact Date) 22.08 kg/m2           Blood Pressure from Last 3 Encounters:   01/17/18 110/70   12/06/17 110/81   12/04/17 108/74    Weight from Last 3 Encounters:   01/17/18 141 lb (64 kg)   12/05/17 150 lb 3.2 oz (68.1 kg)   12/04/17 150 lb 3.2 oz (68.1 kg)              Today, you had the following     No orders found for display         Today's Medication Changes          These changes are accurate as of: 1/17/18 11:59 PM.  If you have any questions, ask your nurse or doctor.               Start taking these medicines.        Dose/Directions    medroxyPROGESTERone 150 MG/ML injection   Commonly known as:  DEPO-PROVERA   Used for:  Encounter for other general counseling or advice on contraception   Started by:  Lesly Magana DO        Dose:  150 mg   Inject 1 mL (150 mg) into the muscle every 3 months   Quantity:  1 mL   Refills:  0         Stop taking these medicines if you haven't already. Please contact your care team if you have questions.     PRENATAL AD PO   Stopped by:  Lesly Magana DO           VENTOLIN  (90 BASE) MCG/ACT Inhaler   Generic drug:  albuterol   Stopped by:  Lesly Magana DO                Where to get your medicines      Some of these will need a paper prescription and others can be bought over the counter.  Ask your nurse if you have questions.     You don't need a prescription for these medications     medroxyPROGESTERone 150 MG/ML injection                Primary Care Provider Office Phone # Fax #    Kole Raymundo [phone] [address] [facility] [address]        Equal Access to Services     HILARIO STEPHENS AH: cielo Irizarry qaybta kaalmada adeegyada,  carlo noleneusebio rojas'aan ah. So [place] [phone].    ATENCIÓN: Si habla erin, tiene a remdond disposición servicios gratuitos de asistencia lingüística. Shyam al [phone].    We comply with applicable federal civil rights laws and [place] laws. We do not discriminate on the basis of race, color, national origin, age, disability, sex, sexual orientation, or gender identity.            Thank you!     Thank you for choosing [facility]  for your care. Our goal is always to provide you with excellent care. Hearing back from our patients is one way we can continue to improve our services. Please take a few minutes to complete the written survey that you may receive in the mail after your visit with us. Thank you!             Your Updated Medication List - Protect others around you: Learn how to safely use, store and throw away your medicines at www.disposemymeds.org.          This list is accurate as of: 1/17/18 11:59 PM.  Always use your most recent med list.                   Brand Name Dispense Instructions for use Diagnosis    medroxyPROGESTERone 150 MG/ML injection    DEPO-PROVERA    1 mL    Inject 1 mL (150 mg) into the muscle every 3 months    Encounter for other general counseling or advice on contraception

## 2018-03-15 ENCOUNTER — OFFICE VISIT (OUTPATIENT)
Dept: OBGYN | Facility: CLINIC | Age: 38
End: 2018-03-15
Payer: COMMERCIAL

## 2018-03-15 VITALS
DIASTOLIC BLOOD PRESSURE: 80 MMHG | BODY MASS INDEX: 22.44 KG/M2 | WEIGHT: 143 LBS | SYSTOLIC BLOOD PRESSURE: 126 MMHG | HEIGHT: 67 IN

## 2018-03-15 DIAGNOSIS — Z01.419 ENCOUNTER FOR GYNECOLOGICAL EXAMINATION WITHOUT ABNORMAL FINDING: Primary | ICD-10-CM

## 2018-03-15 LAB — TSH SERPL DL<=0.005 MIU/L-ACNC: 1.31 MU/L (ref 0.4–4)

## 2018-03-15 PROCEDURE — 36415 COLL VENOUS BLD VENIPUNCTURE: CPT | Performed by: OBSTETRICS & GYNECOLOGY

## 2018-03-15 PROCEDURE — 99395 PREV VISIT EST AGE 18-39: CPT | Performed by: OBSTETRICS & GYNECOLOGY

## 2018-03-15 PROCEDURE — 84443 ASSAY THYROID STIM HORMONE: CPT | Performed by: OBSTETRICS & GYNECOLOGY

## 2018-03-15 NOTE — MR AVS SNAPSHOT
After Visit Summary   3/15/2018    Delicia Henry    MRN: [id number]           Patient Information     Date Of Birth          1980        Visit Information        Provider Department      3/15/2018 11:30 AM Lesly Magana DO [facility] [place]        Today's Diagnoses     Encounter for gynecological examination without abnormal finding    -  1      Care Instructions    Preventive Health Recommendations  Female Ages 26 - 39  Yearly exam:    See your health care provider every year in order to    Review health changes.      Discuss preventive care.       Review your medicines if you your doctor has prescribed any.    Until age 30: Get a Pap test every three years (more often if you have had an abnormal result).    After age 30: Talk to your doctor about whether you should have a Pap test every 3 years or have a Pap test with HPV screening every 5 years.    You do not need a Pap test if your uterus was removed (hysterectomy) and you have not had cancer.  You should be tested each year for STDs (sexually transmitted diseases), if you're at risk.   Talk to your provider about how often to have your cholesterol checked.  If you are at risk for diabetes, you should have a diabetes test (fasting glucose).  Shots: Get a flu shot each year. Get a tetanus shot every 10 years.    Nutrition:      Eat at least 5 servings of fruits and vegetables each day.    Eat whole-grain bread, whole-wheat pasta and brown rice instead of white grains and rice.    Consume 1000mg of Calcium and 400 IU of Vitamin D daily. If these are not in your regular diet you should take a supplement.    To calculate the calcium in your food add a zero to the percent found on the packaging (ex: 30% = 300mg of calcium per serving)    Lifestyle    Get in at least 150 minutes of physical activity a week (30 minutes a day, 5 days of the week), of which about half should be vigorous exercise (jogging, swimming, lifting  "weights).  This will help you control your weight and prevent disease.    Limit alcohol to one drink per day.    No smoking.      Wear sunscreen to prevent skin cancer.    See your dentist every six months for an exam and cleaning          Follow-ups after your visit        Follow-up notes from your care team     Return for Annual Exam.      Your next 10 appointments already scheduled     2018  2:30 PM CDT   [facility]urse [facility] with [facility]LPN   [facility]    [address] [place] [address] [phone]              Who to contact     If you have questions or need follow up information about today's clinic visit or your schedule please contact [facility] directly at [phone].  Normal or non-critical lab and imaging results will be communicated to you by MyChart, letter or phone within 4 business days after the clinic has received the results. If you do not hear from us within 7 days, please contact the clinic through MyChart or phone. If you have a critical or abnormal lab result, we will notify you by phone as soon as possible.  Submit refill requests through [employer] or call your pharmacy and they will forward the refill request to us. Please allow 3 business days for your refill to be completed.          Additional Information About Your Visit        [employer] Information     [employer] lets you send messages to your doctor, view your test results, renew your prescriptions, schedule appointments and more. To sign up, go to [email or web address] . Click on \"Log in\" on the left side of the screen, which will take you to the Welcome page. Then click on \"Sign up Now\" on the right side of the page.     You will be asked to enter the access code listed below, as well as some personal information. Please follow the directions to create your username and password.     Your access code is: [email or web address]  Expires: 2018  4:10 PM     Your access code will  " "in 90 days. If you need help or a new code, please call your [place] clinic or [phone].        Care EveryWhere ID     This is your Care EveryWhere ID. This could be used by other organizations to access your [place] medical records  [id number]        Your Vitals Were     Height BMI (Body Mass Index)                5' 7\" (1.702 m) 22.4 kg/m2           Blood Pressure from Last 3 Encounters:   03/15/18 126/80   01/17/18 110/70   12/06/17 110/81    Weight from Last 3 Encounters:   03/15/18 143 lb (64.9 kg)   01/17/18 141 lb (64 kg)   12/05/17 150 lb 3.2 oz (68.1 kg)              We Performed the Following     TSH with Free T4 Reflex        Primary Care Provider Office Phone # Fax #    Lesly Magana,  [phone]       [address] [address]        Equal Access to Services     [facility] : Hadii aad ku hadasho Soomaali, waaxda luqadaha, qaybta kaalmada adeegyada, waxay idiin hayaan lowell pacheco . So [facility] [phone].    ATENCIÓN: Si habla español, tiene a redmond disposición servicios gratuitos de asistencia lingüística. Llame al [phone].    We comply with applicable federal civil rights laws and [place] laws. We do not discriminate on the basis of race, color, national origin, age, disability, sex, sexual orientation, or gender identity.            Thank you!     Thank you for choosing [facility] [place]  for your care. Our goal is always to provide you with excellent care. Hearing back from our patients is one way we can continue to improve our services. Please take a few minutes to complete the written survey that you may receive in the mail after your visit with us. Thank you!             Your Updated Medication List - Protect others around you: Learn how to safely use, store and throw away your medicines at www.disposemymeds.org.          This list is accurate as of 3/15/18 12:13 PM.  Always use your most recent med list.                   Brand " Name Dispense Instructions for use Diagnosis    medroxyPROGESTERone 150 MG/ML injection    DEPO-PROVERA    1 mL    Inject 1 mL (150 mg) into the muscle every 3 months    Encounter for other general counseling or advice on contraception

## 2018-03-15 NOTE — NURSING NOTE
"Chief Complaint   Patient presents with     Physical     also wants labs drawn       Initial /80 (BP Location: Right arm, Patient Position: Chair, Cuff Size: Adult Regular)  Ht 5' 7\" (1.702 m)  Wt 143 lb (64.9 kg)  BMI 22.4 kg/m2 Estimated body mass index is 22.4 kg/(m^2) as calculated from the following:    Height as of this encounter: 5' 7\" (1.702 m).    Weight as of this encounter: 143 lb (64.9 kg).  BP completed using cuff size: regular        The following HM Due: NONE      Pt is exhausted and hair falling out, thus wants labs drawn, strong family of thyroid issues         Keren Corley CMA    "

## 2018-03-15 NOTE — PATIENT INSTRUCTIONS
Preventive Health Recommendations  Female Ages 26 - 39  Yearly exam:    See your health care provider every year in order to    Review health changes.      Discuss preventive care.       Review your medicines if you your doctor has prescribed any.    Until age 30: Get a Pap test every three years (more often if you have had an abnormal result).    After age 30: Talk to your doctor about whether you should have a Pap test every 3 years or have a Pap test with HPV screening every 5 years.    You do not need a Pap test if your uterus was removed (hysterectomy) and you have not had cancer.  You should be tested each year for STDs (sexually transmitted diseases), if you're at risk.   Talk to your provider about how often to have your cholesterol checked.  If you are at risk for diabetes, you should have a diabetes test (fasting glucose).  Shots: Get a flu shot each year. Get a tetanus shot every 10 years.    Nutrition:      Eat at least 5 servings of fruits and vegetables each day.    Eat whole-grain bread, whole-wheat pasta and brown rice instead of white grains and rice.    Consume 1000mg of Calcium and 400 IU of Vitamin D daily. If these are not in your regular diet you should take a supplement.    To calculate the calcium in your food add a zero to the percent found on the packaging (ex: 30% = 300mg of calcium per serving)    Lifestyle    Get in at least 150 minutes of physical activity a week (30 minutes a day, 5 days of the week), of which about half should be vigorous exercise (jogging, swimming, lifting weights).  This will help you control your weight and prevent disease.    Limit alcohol to one drink per day.    No smoking.      Wear sunscreen to prevent skin cancer.    See your dentist every six months for an exam and cleaning

## 2018-03-15 NOTE — PROGRESS NOTES
Delicia is a 37 year old  female who presents for annual exam.     Besides routine health maintenance,  she would like to discuss possible thyroid issues.    HPI:  The patient does not have a PCP    Several family members with thyroid dysfunction.   One year after childbirth, noticed significant fatigue, hair loss, abdominal upset.  Symptoms resolved with last pregnancy but returned one month ago.    GYNECOLOGIC HISTORY:    No LMP recorded. Patient has had an injection.  Her current contraception method is: depo or other injectable.  She  reports that she has never smoked. She has never used smokeless tobacco.    Patient is sexually active.  STD testing offered?  Declined  Last PHQ-9 score on record =   PHQ-9 SCORE 3/24/2014   Total Score 3     Last GAD7 score on record = No flowsheet data found.      HEALTH MAINTENANCE:  Cholesterol: (No results found for: CHOL   Last Mammo:N/A  Pap: 17 Neg  Lab Results   Component Value Date    PAP NIL 2017    PAP NIL 2013    PAP NIL 2011   Colonoscopy:  Never  Dexa: Never    Health maintenance updated:  no    HISTORY:  Obstetric History       T4      L4     SAB1   TAB0   Ectopic0   Multiple0   Live Births4       # Outcome Date GA Lbr Scottie/2nd Weight Sex Delivery Anes PTL Lv   5 Term 17 38w5d 01:30 / 00:47 6 lb 9.1 oz (2.98 kg) F Vag-Spont Local N ANANYA      Name: CORNELIO HAYWOOD      Apgar1:  9               Apgar5: 10   4 Term 14 37w1d 02:35 / 00:11 6 lb 3.1 oz (2.81 kg) M Vag-Spont IV REGIONAL N ANANYA      Name: CORNELIO GALINDO L      Apgar1:  8                Apgar5: 9   3 Term 03 38w0d 02:00 6 lb 5 oz (2.863 kg) M    ANANYA      Name: Heriberto   2 Term 99 38w0d  6 lb 12 oz (3.062 kg) M Vag-Spont   ANANYA      Name: Luke   1 SAB  8w0d                 Patient Active Problem List   Diagnosis     Cleft palate     Prenatal care, subsequent pregnancy     Indication for care in labor or delivery     Vaginal  "delivery     Past Surgical History:   Procedure Laterality Date     DILATION AND CURETTAGE SUCTION  2/13/2012    Procedure:DILATION AND CURETTAGE SUCTION; DILATION AND CURETTAGE SUCTION ; Surgeon:LEIGHTON GALLAGHER; Location:[facility] OR     ENT SURGERY      tumor rem under ear, benign      Social History   Substance Use Topics     Smoking status: Never Smoker     Smokeless tobacco: Never Used     Alcohol use No      Comment: Pregnant      Problem (# of Occurrences) Relation (Name,Age of Onset)    Allergies (2) Father: seasonal, Sister: seasonal            Current Outpatient Prescriptions   Medication Sig     medroxyPROGESTERone (DEPO-PROVERA) 150 MG/ML injection Inject 1 mL (150 mg) into the muscle every 3 months     No current facility-administered medications for this visit.      Allergies   Allergen Reactions     Avelox Swelling     Swelling tongue, dizzy, itching     Erythromycin Nausea     Sulfa Drugs      Unknown reaction       Past medical, surgical, social and family histories were reviewed and updated in EPIC.    ROS:   12 point review of systems negative other than symptoms noted below.    EXAM:  /80 (BP Location: Right arm, Patient Position: Chair, Cuff Size: Adult Regular)  Ht 5' 7\" (1.702 m)  Wt 143 lb (64.9 kg)  BMI 22.4 kg/m2   BMI: Body mass index is 22.4 kg/(m^2).    PHYSICAL EXAM:  Constitutional:  Appearance: Well nourished, well developed, alert, in no acute distress  Neck:  Lymph Nodes:  No lymphadenopathy present    Thyroid:  Gland size normal, nontender, no nodules or masses present  on palpation  Breasts: Inspection of Breasts:  No lymphadenopathy present., Palpation of Breasts and Axillae:  No masses present on palpation, no breast tenderness. and Axillary Lymph Nodes:  No lymphadenopathy present.  Gastrointestinal:   Abdominal Examination:  Abdomen nontender to palpation, tone normal without rigidity or guarding, no masses present, umbilicus without lesions   Liver and Spleen:  No " hepatomegaly present, liver nontender to palpation    Hernias:  No hernias present  Lymphatic: Lymph Nodes:  No other lymphadenopathy present  Skin:  General Inspection:  No rashes present, no lesions present, no areas of  discoloration    Genitalia and Groin:  No rashes present, no lesions present, no areas of  discoloration, no masses present  Neurologic/Psychiatric:    Mental Status:  Oriented X3     Pelvic Exam:  External Genitalia:     Normal appearance for age, no discharge present, no tenderness present, no inflammatory lesions present, color normal  Vagina:     Normal vaginal vault without central or paravaginal defects, no discharge present, no inflammatory lesions present, no masses present  Bladder:     Nontender to palpation  Urethra:   Urethral Body:  Urethra palpation normal, urethra structural support normal   Urethral Meatus:  No erythema or lesions present  Cervix:     Appearance healthy, no lesions present, nontender to palpation, no bleeding present  Uterus:     Uterus: firm, normal sized and nontender, anteverted in position.   Adnexa:     No adnexal tenderness present, no adnexal masses present  Perineum:     Perineum within normal limits, no evidence of trauma, no rashes or skin lesions present  Anus:     Anus within normal limits, no hemorrhoids present  Inguinal Lymph Nodes:     No lymphadenopathy present  Pubic Hair:     Normal pubic hair distribution for age  Genitalia and Groin:     No rashes present, no lesions present, no areas of discoloration, no masses present      COUNSELING:   Reviewed preventive health counseling, as reflected in patient instructions    BMI: Body mass index is 22.4 kg/(m^2).      ASSESSMENT:  37 year old female with satisfactory annual exam.    ICD-10-CM    1. Encounter for gynecological examination without abnormal finding Z01.419 TSH with Free T4 Reflex       PLAN:  Post partum - delivery 12/5/17.  Bottle feeding due to cleft palate.  Requests thyroid evaluation  today.   Most recent pap 7/5/17 - negative.       Lesly Magana, DO

## 2018-04-04 ENCOUNTER — ALLIED HEALTH/NURSE VISIT (OUTPATIENT)
Dept: NURSING | Facility: CLINIC | Age: 38
End: 2018-04-04
Payer: COMMERCIAL

## 2018-04-04 PROCEDURE — 96372 THER/PROPH/DIAG INJ SC/IM: CPT

## 2018-05-14 ENCOUNTER — OFFICE VISIT (OUTPATIENT)
Dept: FAMILY MEDICINE | Facility: CLINIC | Age: 38
End: 2018-05-14
Payer: COMMERCIAL

## 2018-05-14 VITALS
BODY MASS INDEX: 23.13 KG/M2 | DIASTOLIC BLOOD PRESSURE: 78 MMHG | HEART RATE: 90 BPM | OXYGEN SATURATION: 99 % | HEIGHT: 67 IN | WEIGHT: 147.38 LBS | SYSTOLIC BLOOD PRESSURE: 106 MMHG | TEMPERATURE: 98.4 F

## 2018-05-14 DIAGNOSIS — H69.92 DYSFUNCTION OF LEFT EUSTACHIAN TUBE: Primary | ICD-10-CM

## 2018-05-14 PROBLEM — Z34.80 PRENATAL CARE, SUBSEQUENT PREGNANCY: Status: RESOLVED | Noted: 2017-07-25 | Resolved: 2018-05-14

## 2018-05-14 PROCEDURE — 99213 OFFICE O/P EST LOW 20 MIN: CPT | Performed by: PHYSICIAN ASSISTANT

## 2018-05-14 RX ORDER — FLUTICASONE PROPIONATE 50 MCG
2 SPRAY, SUSPENSION (ML) NASAL DAILY
Qty: 1 BOTTLE | Refills: 0 | Status: SHIPPED | OUTPATIENT
Start: 2018-05-14 | End: 2020-02-04

## 2018-05-14 RX ORDER — PSEUDOEPHEDRINE HCL 120 MG/1
120 TABLET, FILM COATED, EXTENDED RELEASE ORAL EVERY 12 HOURS
Qty: 10 TABLET | Refills: 0 | COMMUNITY
Start: 2018-05-14 | End: 2018-05-19

## 2018-05-14 NOTE — MR AVS SNAPSHOT
After Visit Summary   5/14/2018    Delicia Henry    MRN: [id number]           Patient Information     Date Of Birth          1980        Visit Information        Provider Department      5/14/2018 4:40 PM Nancy Carlos PA-C [facility]        Today's Diagnoses     Dysfunction of left eustachian tube    -  1      Care Instructions      Earache, No Infection (Adult)  Earaches can happen without an infection. This occurs when air and fluid build up behind the eardrum causing a feeling of fullness and discomfort and reduced hearing. This is called otitis media with effusion (OME) or serous otitis media. It means there is fluid in the middle ear. It is not the same as acute otitis media, which is typically from infection.  OME can happen when you have a cold if congestion blocks the passage that drains the middle ear. This passage is called the eustachian tube. OME may also occur with nasal allergies or after a bacterial middle ear infection.    The pain or discomfort may come and go. You may hear clicking or popping sounds when you chew or swallow. You may feel that your balance is off. Or you may hear ringing in the ear.  It often takes from several weeks up to 3 months for the fluid to clear on its own. Oral pain relievers and ear drops help if there is pain. Decongestants and antihistamines sometimes help. Antibiotics don't help since there is no infection. Your doctor may prescribe a nasal spray to help reduce swelling in the nose and eustachian tube. This can allow the ear to drain.  If your OME doesn't improve after 3 months, surgery may be used to drain the fluid and insert a small tube in the eardrum to allow continued drainage.  Because the middle ear fluid can become infected, it is important to watch for signs of an ear infection which may develop later. These signs include increased ear pain, fever, or drainage from the ear.  Home care  The following  guidelines will help you care for yourself at home:    You may use over-the-counter medicine as directed to control pain, unless another medicine was prescribed. If you have chronic liver or kidney disease or ever had a stomach ulcer or GI bleeding, talk with your doctor before using these medicines. Aspirin should never be used in anyone under 18 years of age who is ill with a fever. It may cause severe liver damage.    You may use over-the-counter decongestants such as phenylephrine or pseudoephedrine. But they are not always helpful. Don't use nasal spray decongestants more than 3 days. Longer use can make congestion worse. Prescription nasal sprays from your doctor don't typically have those restrictions.    Antihistamines may help if you are also having allergy symptoms.    You may use medicines such as guaifenesin to thin mucus and promote drainage.  Follow-up care  Follow up with your healthcare provider or as advised if you are not feeling better after 3 days.  When to seek medical advice  Call your healthcare provider right away if any of the following occur:    Your ear pain gets worse or does not start to improve     Fever of 100.4 F (38 C) or higher, or as directed by your healthcare provider    Fluid or blood draining from the ear    Headache or sinus pain    Stiff neck    Unusual drowsiness or confusion  Date Last Reviewed: 10/1/2016    7922-7150 [employer]. [address] [place] [address], [place] [address]. All rights reserved. This information is not intended as a substitute for professional medical care. Always follow your healthcare professional's instructions.                Follow-ups after your visit        Follow-up notes from your care team     Return if symptoms worsen or fail to improve.      Who to contact     If you have questions or need follow up information about today's clinic visit or your schedule please contact [facility] directly at [phone].  Normal or  "non-critical lab and imaging results will be communicated to you by MyChart, letter or phone within 4 business days after the clinic has received the results. If you do not hear from us within 7 days, please contact the clinic through [employer] or phone. If you have a critical or abnormal lab result, we will notify you by phone as soon as possible.  Submit refill requests through [employer] or call your pharmacy and they will forward the refill request to us. Please allow 3 business days for your refill to be completed.          Additional Information About Your Visit        [employer] Information     [employer] lets you send messages to your doctor, view your test results, renew your prescriptions, schedule appointments and more. To sign up, go to [email or web address] . Click on \"Log in\" on the left side of the screen, which will take you to the Welcome page. Then click on \"Sign up Now\" on the right side of the page.     You will be asked to enter the access code listed below, as well as some personal information. Please follow the directions to create your username and password.     Your access code is: [email or web address]  Expires: 2018  4:58 PM     Your access code will  in 90 days. If you need help or a new code, please call your [place] clinic or [phone].        Care EveryWhere ID     This is your Care EveryWhere ID. This could be used by other organizations to access your [place] medical records  [id number]        Your Vitals Were     Pulse Temperature Height Pulse Oximetry BMI (Body Mass Index)       90 98.4  F (36.9  C) (Tympanic) 5' 7\" (1.702 m) 99% 23.08 kg/m2        Blood Pressure from Last 3 Encounters:   18 106/78   03/15/18 126/80   18 110/70    Weight from Last 3 Encounters:   18 147 lb 6 oz (66.8 kg)   03/15/18 143 lb (64.9 kg)   18 141 lb (64 kg)              Today, you had the following     No orders found for display         Today's Medication Changes          These " changes are accurate as of 5/14/18  4:58 PM.  If you have any questions, ask your nurse or doctor.               Start taking these medicines.        Dose/Directions    fluticasone 50 MCG/ACT spray   Commonly known as:  FLONASE   Used for:  Dysfunction of left eustachian tube   Started by:  Nancy Carlos PA-C        Dose:  2 spray   Spray 2 sprays into both nostrils daily   Quantity:  1 Bottle   Refills:  0       pseudoePHEDrine 120 MG 12 hr tablet   Commonly known as:  SUDAFED   Used for:  Dysfunction of left eustachian tube   Started by:  Nancy Carlos PA-C        Dose:  120 mg   Take 1 tablet (120 mg) by mouth every 12 hours for 5 days   Quantity:  10 tablet   Refills:  0            Where to get your medicines      These medications were sent to [facility] [address] [address] AT [address] [address] [address] [address], [facility] [address]    Hours:  24-hours Phone:  [phone]     fluticasone 50 MCG/ACT spray         Some of these will need a paper prescription and others can be bought over the counter.  Ask your nurse if you have questions.     You don't need a prescription for these medications     pseudoePHEDrine 120 MG 12 hr tablet                Primary Care Provider Office Phone # Fax #    Lesly MaganaDO [phone]       [address] [address]        Equal Access to Services     JOSE STEPHENS AH: Hadii aad ku hadasho Soomaali, waaxda luqadaha, qaybta kaalmada adeegyada, carlo trent haybaljinder barlow. So [facility] [phone].    ATENCIÓN: Si habla español, tiene a redmond disposición servicios gratuitos de asistencia lingüística. Shyam al [phone].    We comply with applicable federal civil rights laws and [place] laws. We do not discriminate on the basis of race, color, national origin, age, disability, sex, sexual orientation, or gender identity.            Thank you!     Thank you for  choosing [facility]  for your care. Our goal is always to provide you with excellent care. Hearing back from our patients is one way we can continue to improve our services. Please take a few minutes to complete the written survey that you may receive in the mail after your visit with us. Thank you!             Your Updated Medication List - Protect others around you: Learn how to safely use, store and throw away your medicines at www.disposemymeds.org.          This list is accurate as of 5/14/18  4:58 PM.  Always use your most recent med list.                   Brand Name Dispense Instructions for use Diagnosis    fluticasone 50 MCG/ACT spray    FLONASE    1 Bottle    Spray 2 sprays into both nostrils daily    Dysfunction of left eustachian tube       medroxyPROGESTERone 150 MG/ML injection    DEPO-PROVERA    1 mL    Inject 1 mL (150 mg) into the muscle every 3 months    Encounter for other general counseling or advice on contraception       pseudoePHEDrine 120 MG 12 hr tablet    SUDAFED    10 tablet    Take 1 tablet (120 mg) by mouth every 12 hours for 5 days    Dysfunction of left eustachian tube

## 2018-05-14 NOTE — PATIENT INSTRUCTIONS
Earache, No Infection (Adult)  Earaches can happen without an infection. This occurs when air and fluid build up behind the eardrum causing a feeling of fullness and discomfort and reduced hearing. This is called otitis media with effusion (OME) or serous otitis media. It means there is fluid in the middle ear. It is not the same as acute otitis media, which is typically from infection.  OME can happen when you have a cold if congestion blocks the passage that drains the middle ear. This passage is called the eustachian tube. OME may also occur with nasal allergies or after a bacterial middle ear infection.    The pain or discomfort may come and go. You may hear clicking or popping sounds when you chew or swallow. You may feel that your balance is off. Or you may hear ringing in the ear.  It often takes from several weeks up to 3 months for the fluid to clear on its own. Oral pain relievers and ear drops help if there is pain. Decongestants and antihistamines sometimes help. Antibiotics don't help since there is no infection. Your doctor may prescribe a nasal spray to help reduce swelling in the nose and eustachian tube. This can allow the ear to drain.  If your OME doesn't improve after 3 months, surgery may be used to drain the fluid and insert a small tube in the eardrum to allow continued drainage.  Because the middle ear fluid can become infected, it is important to watch for signs of an ear infection which may develop later. These signs include increased ear pain, fever, or drainage from the ear.  Home care  The following guidelines will help you care for yourself at home:    You may use over-the-counter medicine as directed to control pain, unless another medicine was prescribed. If you have chronic liver or kidney disease or ever had a stomach ulcer or GI bleeding, talk with your doctor before using these medicines. Aspirin should never be used in anyone under 18 years of age who is ill with a fever. It  may cause severe liver damage.    You may use over-the-counter decongestants such as phenylephrine or pseudoephedrine. But they are not always helpful. Don't use nasal spray decongestants more than 3 days. Longer use can make congestion worse. Prescription nasal sprays from your doctor don't typically have those restrictions.    Antihistamines may help if you are also having allergy symptoms.    You may use medicines such as guaifenesin to thin mucus and promote drainage.  Follow-up care  Follow up with your healthcare provider or as advised if you are not feeling better after 3 days.  When to seek medical advice  Call your healthcare provider right away if any of the following occur:    Your ear pain gets worse or does not start to improve     Fever of 100.4 F (38 C) or higher, or as directed by your healthcare provider    Fluid or blood draining from the ear    Headache or sinus pain    Stiff neck    Unusual drowsiness or confusion  Date Last Reviewed: 10/1/2016    3087-6690 [employer] [address] [place] [address], [place] [address]. All rights reserved. This information is not intended as a substitute for professional medical care. Always follow your healthcare professional's instructions.

## 2018-05-14 NOTE — PROGRESS NOTES
SUBJECTIVE:   Delicia Henry is a 37 year old female who presents to clinic today for the following health issues:    Ear Pain  Delicia presents to clinic reporting symptoms of left sided ear pain and pressure that onset this morning. She states that there is a sensation of fluid in the ear and hears crackling in her inner ear when she opens and closes her mouth. She describes her ear pain as a discomfort and not necessarily pain.    Problem list and histories reviewed & adjusted, as indicated.  Additional history: as documented    Patient Active Problem List   Diagnosis     Cleft palate     Past Surgical History:   Procedure Laterality Date     DILATION AND CURETTAGE SUCTION  2/13/2012    Procedure:DILATION AND CURETTAGE SUCTION; DILATION AND CURETTAGE SUCTION ; Surgeon:LEIGHTON GALLAGHER; Location:[facility] OR     ENT SURGERY      tumor rem under ear, benign       Social History   Substance Use Topics     Smoking status: Never Smoker     Smokeless tobacco: Never Used     Alcohol use No     Family History   Problem Relation Age of Onset     Allergies Father      seasonal     Allergies Sister      seasonal         Current Outpatient Prescriptions   Medication Sig Dispense Refill     fluticasone (FLONASE) 50 MCG/ACT spray Spray 2 sprays into both nostrils daily 1 Bottle 0     medroxyPROGESTERone (DEPO-PROVERA) 150 MG/ML injection Inject 1 mL (150 mg) into the muscle every 3 months 1 mL 0     pseudoePHEDrine (SUDAFED) 120 MG 12 hr tablet Take 1 tablet (120 mg) by mouth every 12 hours for 5 days 10 tablet 0     Allergies   Allergen Reactions     Avelox Swelling     Swelling tongue, dizzy, itching     Erythromycin Nausea     Sulfa Drugs      Unknown reaction       Reviewed and updated as needed this visit by clinical staff  Tobacco  Allergies  Meds  Problems  Med Hx  Surg Hx  Fam Hx  Soc Hx        Reviewed and updated as needed this visit by Provider  Tobacco  Allergies  Meds  Problems  Med Hx  Surg Hx  " Fam Hx  Soc Hx          ROS:  Constitutional, HEENT, cardiovascular, pulmonary, GI, , musculoskeletal, neuro, skin, endocrine and psych systems are negative, except as otherwise noted.    This document serves as a record of the services and decisions personally performed and made by Nancy Carlos PA-C. It was created on her behalf by Boubacar De Souza, a trained medical scribe. The creation of this document is based on the provider's statements to the medical scribe.  Boubacar De Souza 4:47 PM May 14, 2018    OBJECTIVE:   /78  Pulse 90  Temp 98.4  F (36.9  C) (Tympanic)  Ht 5' 7\" (1.702 m)  Wt 147 lb 6 oz (66.8 kg)  SpO2 99%  BMI 23.08 kg/m2  Body mass index is 23.08 kg/(m^2).  GENERAL: healthy, alert and no distress  HENT: clear effusion on left TM, otherwise ear canals and TM's normal, nose and mouth without ulcers or lesions  NECK: no adenopathy, no asymmetry, masses, or scars and thyroid normal to palpation  RESP: lungs clear to auscultation - no rales, rhonchi or wheezes  CV: regular rate and rhythm, normal S1 S2, no S3 or S4, no murmur, click or rub, no peripheral edema and peripheral pulses strong  SKIN: no suspicious lesions or rashes  PSYCH: mentation appears normal, affect normal/bright    Diagnostic Test Results:  none     ASSESSMENT/PLAN:   Delicia was seen today for ear problem.    Diagnoses and all orders for this visit:    Dysfunction of left eustachian tube  Start fluticasone and pseudoephedrine to treat eustachian tube dysfunction. Advised patient proper application of the nasal spray and advised patient that it has a cumulative effect and will work more effectively when used in consecutive days. Recommended eating foods that require mastication. Follow up if symptoms persist or worsen.   -     fluticasone (FLONASE) 50 MCG/ACT spray; Spray 2 sprays into both nostrils daily  -     pseudoePHEDrine (SUDAFED) 120 MG 12 hr tablet; Take 1 tablet (120 mg) by mouth every 12 hours for " 5 days    The information in this document, created by the medical scribe for me, accurately reflects the services I personally performed and the decisions made by me. I have reviewed and approved this document for accuracy prior to leaving the patient care area.  May 14, 2018 4:52 PM    Nancy Carlos PA-C  [facility]

## 2018-06-21 ENCOUNTER — ALLIED HEALTH/NURSE VISIT (OUTPATIENT)
Dept: NURSING | Facility: CLINIC | Age: 38
End: 2018-06-21
Payer: COMMERCIAL

## 2018-06-21 VITALS — SYSTOLIC BLOOD PRESSURE: 110 MMHG | WEIGHT: 147 LBS | BODY MASS INDEX: 23.02 KG/M2 | DIASTOLIC BLOOD PRESSURE: 66 MMHG

## 2018-06-21 DIAGNOSIS — Z30.9 CONTRACEPTIVE MANAGEMENT: Primary | ICD-10-CM

## 2018-06-21 PROCEDURE — 96372 THER/PROPH/DIAG INJ SC/IM: CPT

## 2018-09-06 ENCOUNTER — ALLIED HEALTH/NURSE VISIT (OUTPATIENT)
Dept: NURSING | Facility: CLINIC | Age: 38
End: 2018-09-06
Payer: COMMERCIAL

## 2018-09-06 DIAGNOSIS — Z30.9 CONTRACEPTIVE MANAGEMENT: Primary | ICD-10-CM

## 2018-09-06 PROCEDURE — 96372 THER/PROPH/DIAG INJ SC/IM: CPT

## 2018-11-23 ENCOUNTER — ALLIED HEALTH/NURSE VISIT (OUTPATIENT)
Dept: NURSING | Facility: CLINIC | Age: 38
End: 2018-11-23
Payer: COMMERCIAL

## 2018-11-23 DIAGNOSIS — Z23 ENCOUNTER FOR IMMUNIZATION: Primary | ICD-10-CM

## 2018-11-23 PROCEDURE — 96372 THER/PROPH/DIAG INJ SC/IM: CPT

## 2019-02-08 ENCOUNTER — ALLIED HEALTH/NURSE VISIT (OUTPATIENT)
Dept: NURSING | Facility: CLINIC | Age: 39
End: 2019-02-08
Payer: COMMERCIAL

## 2019-02-08 VITALS — DIASTOLIC BLOOD PRESSURE: 70 MMHG | BODY MASS INDEX: 23.96 KG/M2 | WEIGHT: 153 LBS | SYSTOLIC BLOOD PRESSURE: 112 MMHG

## 2019-02-08 DIAGNOSIS — Z30.9 ENCOUNTER FOR CONTRACEPTIVE MANAGEMENT, UNSPECIFIED TYPE: Primary | ICD-10-CM

## 2019-02-08 PROCEDURE — 96372 THER/PROPH/DIAG INJ SC/IM: CPT

## 2019-02-08 RX ORDER — MEDROXYPROGESTERONE ACETATE 150 MG/ML
150 INJECTION, SUSPENSION INTRAMUSCULAR
Status: COMPLETED | OUTPATIENT
Start: 2019-02-08 | End: 2019-12-17

## 2019-02-08 RX ADMIN — MEDROXYPROGESTERONE ACETATE 150 MG: 150 INJECTION, SUSPENSION INTRAMUSCULAR at 14:43

## 2019-02-08 NOTE — PROGRESS NOTES
BP: 112/70    LAST PAP/EXAM:   Lab Results   Component Value Date    PAP NIL 07/05/2017     URINE HCG:not indicated    The following medication was given:     MEDICATION: Depo Provera 150mg  ROUTE: IM  SITE: [facility]  : [facility]  LOT #: [id number]  EXP:6/30/20  NEXT INJECTION DUE: 4/26/19 - 5/10/19   Provider: Yi

## 2019-04-26 ENCOUNTER — ALLIED HEALTH/NURSE VISIT (OUTPATIENT)
Dept: NURSING | Facility: CLINIC | Age: 39
End: 2019-04-26
Payer: COMMERCIAL

## 2019-04-26 PROCEDURE — 96372 THER/PROPH/DIAG INJ SC/IM: CPT

## 2019-04-26 RX ADMIN — MEDROXYPROGESTERONE ACETATE 150 MG: 150 INJECTION, SUSPENSION INTRAMUSCULAR at 17:00

## 2019-07-16 ENCOUNTER — ALLIED HEALTH/NURSE VISIT (OUTPATIENT)
Dept: NURSING | Facility: CLINIC | Age: 39
End: 2019-07-16
Payer: COMMERCIAL

## 2019-07-16 DIAGNOSIS — Z30.9 ENCOUNTER FOR CONTRACEPTIVE MANAGEMENT, UNSPECIFIED TYPE: Primary | ICD-10-CM

## 2019-07-16 PROCEDURE — 96372 THER/PROPH/DIAG INJ SC/IM: CPT

## 2019-07-16 RX ADMIN — MEDROXYPROGESTERONE ACETATE 150 MG: 150 INJECTION, SUSPENSION INTRAMUSCULAR at 14:30

## 2019-07-23 NOTE — NURSING NOTE
The following medication was given:     MEDICATION: Depo Provera 150mg  ROUTE: IM  SITE: [facility]  DOSE: 1 mL  LOT #: [id number]  :  Yung  EXPIRATION DATE:  6/2020  NDC#: 27116-157-39    Next Due 10/1/219 - 10/15/2019    Nancy Valerio MA

## 2019-08-31 ENCOUNTER — OFFICE VISIT (OUTPATIENT)
Dept: URGENT CARE | Facility: URGENT CARE | Age: 39
End: 2019-08-31
Payer: COMMERCIAL

## 2019-08-31 VITALS
TEMPERATURE: 98.3 F | HEART RATE: 70 BPM | BODY MASS INDEX: 23.57 KG/M2 | DIASTOLIC BLOOD PRESSURE: 74 MMHG | WEIGHT: 150.5 LBS | OXYGEN SATURATION: 100 % | SYSTOLIC BLOOD PRESSURE: 114 MMHG

## 2019-08-31 DIAGNOSIS — R07.9 CHEST PAIN, UNSPECIFIED TYPE: Primary | ICD-10-CM

## 2019-08-31 LAB
DEPRECATED S PYO AG THROAT QL EIA: NORMAL
DEPRECATED S PYO AG THROAT QL EIA: NORMAL
SPECIMEN SOURCE: NORMAL

## 2019-08-31 PROCEDURE — 87880 STREP A ASSAY W/OPTIC: CPT | Performed by: FAMILY MEDICINE

## 2019-08-31 PROCEDURE — 99214 OFFICE O/P EST MOD 30 MIN: CPT | Performed by: FAMILY MEDICINE

## 2019-08-31 RX ORDER — OMEPRAZOLE 40 MG/1
40 CAPSULE, DELAYED RELEASE ORAL DAILY
Qty: 30 CAPSULE | Refills: 0 | Status: SHIPPED | OUTPATIENT
Start: 2019-08-31 | End: 2020-02-04

## 2019-08-31 RX ORDER — LIDOCAINE HYDROCHLORIDE 20 MG/ML
15 SOLUTION OROPHARYNGEAL
Qty: 100 ML | Refills: 0 | Status: SHIPPED | OUTPATIENT
Start: 2019-08-31 | End: 2020-02-04

## 2019-08-31 NOTE — PROGRESS NOTES
Subjective     Delicia Henry is a 38 year old female who presents to clinic today for the following health issues:      She is complaining of feeling or sensation of gas bubbles in her chest irritating painful is been going on for several days.  She says she was not doing anything in particular when this did come about.    She does not complain of any shortness of breath no eyes ears nose or throat problems    No fever no chills        HPI         Patient Active Problem List   Diagnosis     Cleft palate     Past Surgical History:   Procedure Laterality Date     DILATION AND CURETTAGE SUCTION  2/13/2012    Procedure:DILATION AND CURETTAGE SUCTION; DILATION AND CURETTAGE SUCTION ; Surgeon:LEIGHTON GALLAGHER; Location:[facility] OR     ENT SURGERY      tumor rem under ear, benign       Social History     Tobacco Use     Smoking status: Never Smoker     Smokeless tobacco: Never Used   Substance Use Topics     Alcohol use: No     Family History   Problem Relation Age of Onset     Allergies Father         seasonal     Allergies Sister         seasonal           Reviewed and updated as needed this visit by Provider         Review of Systems   ROS COMP: Constitutional, HEENT, cardiovascular, pulmonary, GI, , musculoskeletal, neuro, skin, endocrine and psych systems are negative, except as otherwise noted.      Objective    /74   Pulse 70   Temp 98.3  F (36.8  C) (Oral)   Wt 68.3 kg (150 lb 8 oz)   SpO2 100%   BMI 23.57 kg/m    Body mass index is 23.57 kg/m .  Physical Exam   GENERAL: alert and mild distress  HENT: ear canals and TM's normal, nose and mouth without ulcers or lesions  NECK: no adenopathy, no asymmetry, masses, or scars and thyroid normal to palpation  RESP: lungs clear to auscultation - no rales, rhonchi or wheezes  CV: regular rate and rhythm, normal S1 S2, no S3 or S4, no murmur, click or rub, no peripheral edema and peripheral pulses strong  ABDOMEN: soft, nontender, no hepatosplenomegaly,  no masses and bowel sounds normal  MS: no gross musculoskeletal defects noted, no edema  NEURO: Normal strength and tone, mentation intact and speech normal            Assessment & Plan       ICD-10-CM    1. Chest pain, unspecified type R07.9 lidocaine (XYLOCAINE) 2 % 15 mL, alum & mag hydroxide-simethicone (MYLANTA ES/MAALOX  ES) 15 mL GI Cocktail     ranitidine (ZANTAC) 300 MG tablet     omeprazole (PRILOSEC) 40 MG DR capsule     lidocaine (XYLOCAINE) 2 % solution     We used a GI cocktail she improved significantly.    This is a 38-year-old young female in no apparent distress who is has no chronic medical problems.    I do think she has some GI irritation esophageal in nature.    She has no breathing problems I do not think she has any obstruction it does not sound like it is associated with eating which may mean a foreign body but something is obviously irritated the esophagus.    Could be some reflux type of symptoms although highly doubt it    We will use some viscous lidocaine and some Maalox as needed over the next few days    High-dose Zantac and omeprazole if is getting worse over this period of time she has any other symptoms    That suggest an increase in the problem she should be seen    If it appears to be getting better but not completely resolved by the week she should be seen.          Kole Raymundo MD  [facility]

## 2019-10-01 ENCOUNTER — ALLIED HEALTH/NURSE VISIT (OUTPATIENT)
Dept: NURSING | Facility: CLINIC | Age: 39
End: 2019-10-01
Payer: COMMERCIAL

## 2019-10-01 DIAGNOSIS — Z30.9 ENCOUNTER FOR CONTRACEPTIVE MANAGEMENT, UNSPECIFIED TYPE: Primary | ICD-10-CM

## 2019-10-01 PROCEDURE — 96372 THER/PROPH/DIAG INJ SC/IM: CPT

## 2019-10-01 RX ADMIN — MEDROXYPROGESTERONE ACETATE 150 MG: 150 INJECTION, SUSPENSION INTRAMUSCULAR at 14:39

## 2019-10-01 NOTE — NURSING NOTE
The following medication was given:     MEDICATION: Depo Provera 150mg  ROUTE: IM  SITE: [facility]  DOSE: 150mg  LOT #: [id number]  :  Yung  EXPIRATION DATE:  1/2021  NDC#: 09606-067-93  Next Due: 12/17/19 - 12/31/19

## 2019-11-06 DIAGNOSIS — R07.9 CHEST PAIN, UNSPECIFIED TYPE: ICD-10-CM

## 2019-11-06 NOTE — TELEPHONE ENCOUNTER
"Requested Prescriptions   Pending Prescriptions Disp Refills     ranitidine (ZANTAC) 300 MG tablet [Pharmacy Med Name: RANITIDINE 300MG TABLETS]        Last Written Prescription Date:  8.31.19  Last Fill Quantity: 30 tablet,  # refills: 0   Last office visit:5.14.18 with prescribing provider:   YENI Ledesma      Future Office Visit:           30 tablet 0     Sig: TAKE 1/2 TABLET(150 MG) BY MOUTH AT BEDTIME       H2 Blockers Protocol Passed - 11/6/2019  1:05 PM        Passed - Patient is age 12 or older        Passed - Recent (12 mo) or future (30 days) visit within the authorizing provider's specialty     Patient has had an office visit with the authorizing provider or a provider within the authorizing providers department within the previous 12 mos or has a future within next 30 days. See \"Patient Info\" tab in inbasket, or \"Choose Columns\" in Meds & Orders section of the refill encounter.              Passed - Medication is active on med list        "

## 2019-11-09 NOTE — TELEPHONE ENCOUNTER
Patient has not been seen for over one year.   She has new PCP.  Pharmacy advised to forward to new PCP.    Yazmin Moctezuma, BS, RN, PHN  [facility]) [phone]

## 2019-12-13 ENCOUNTER — TELEPHONE (OUTPATIENT)
Dept: URGENT CARE | Facility: URGENT CARE | Age: 39
End: 2019-12-13

## 2019-12-13 DIAGNOSIS — Z20.828 EXPOSURE TO INFLUENZA: Primary | ICD-10-CM

## 2019-12-13 RX ORDER — OSELTAMIVIR PHOSPHATE 75 MG/1
75 CAPSULE ORAL DAILY
Qty: 10 CAPSULE | Refills: 0 | Status: SHIPPED | OUTPATIENT
Start: 2019-12-13 | End: 2020-02-04

## 2019-12-17 ENCOUNTER — ALLIED HEALTH/NURSE VISIT (OUTPATIENT)
Dept: NURSING | Facility: CLINIC | Age: 39
End: 2019-12-17
Payer: COMMERCIAL

## 2019-12-17 DIAGNOSIS — Z30.9 ENCOUNTER FOR CONTRACEPTIVE MANAGEMENT, UNSPECIFIED TYPE: Primary | ICD-10-CM

## 2019-12-17 PROCEDURE — 96372 THER/PROPH/DIAG INJ SC/IM: CPT

## 2019-12-17 RX ADMIN — MEDROXYPROGESTERONE ACETATE 150 MG: 150 INJECTION, SUSPENSION INTRAMUSCULAR at 14:42

## 2019-12-17 NOTE — NURSING NOTE
The following medication was given:     MEDICATION: Depo Provera 150mg  ROUTE: IM  SITE: [facility]  DOSE: 150 ML  LOT #: [id number]  :  Yung  EXPIRATION DATE:  01/2021  NDC#: 52300-685-66  Next Due - 3/4/2020-3/18/2020

## 2020-01-27 ENCOUNTER — TELEPHONE (OUTPATIENT)
Dept: FAMILY MEDICINE | Facility: CLINIC | Age: 40
End: 2020-01-27

## 2020-01-27 NOTE — TELEPHONE ENCOUNTER
Called pt at [phone] and left non detailed message.  We need to change the dates she is due for her depo as this is a leap year so we are off by a day.  She will nikunj her next injection between 3/3/2020-3/17/2020.  More importantly, her Depo order is  and she is overdue for her annual appointment.  She will need to have an annual appt with someone who can then place the order for her depo injection.  This will need to be done prior to her needing her next injection in March.  Evangelina Hancock

## 2020-02-04 ENCOUNTER — OFFICE VISIT (OUTPATIENT)
Dept: FAMILY MEDICINE | Facility: CLINIC | Age: 40
End: 2020-02-04
Payer: COMMERCIAL

## 2020-02-04 VITALS
DIASTOLIC BLOOD PRESSURE: 72 MMHG | BODY MASS INDEX: 24.17 KG/M2 | HEIGHT: 67 IN | OXYGEN SATURATION: 99 % | HEART RATE: 84 BPM | SYSTOLIC BLOOD PRESSURE: 102 MMHG | WEIGHT: 154 LBS | TEMPERATURE: 98.4 F

## 2020-02-04 DIAGNOSIS — Z13.1 SCREENING FOR DIABETES MELLITUS: ICD-10-CM

## 2020-02-04 DIAGNOSIS — Z13.220 LIPID SCREENING: ICD-10-CM

## 2020-02-04 DIAGNOSIS — Z00.00 ROUTINE GENERAL MEDICAL EXAMINATION AT A HEALTH CARE FACILITY: Primary | ICD-10-CM

## 2020-02-04 DIAGNOSIS — Z30.42 ENCOUNTER FOR SURVEILLANCE OF INJECTABLE CONTRACEPTIVE: ICD-10-CM

## 2020-02-04 DIAGNOSIS — Q63.1 CONGENITAL FUSION OF KIDNEYS: ICD-10-CM

## 2020-02-04 DIAGNOSIS — Z30.09 ENCOUNTER FOR OTHER GENERAL COUNSELING OR ADVICE ON CONTRACEPTION: ICD-10-CM

## 2020-02-04 DIAGNOSIS — Z13.29 SCREENING FOR THYROID DISORDER: ICD-10-CM

## 2020-02-04 DIAGNOSIS — Z12.31 ENCOUNTER FOR SCREENING MAMMOGRAM FOR BREAST CANCER: ICD-10-CM

## 2020-02-04 PROCEDURE — 90471 IMMUNIZATION ADMIN: CPT | Performed by: NURSE PRACTITIONER

## 2020-02-04 PROCEDURE — 36415 COLL VENOUS BLD VENIPUNCTURE: CPT | Performed by: NURSE PRACTITIONER

## 2020-02-04 PROCEDURE — 84443 ASSAY THYROID STIM HORMONE: CPT | Performed by: NURSE PRACTITIONER

## 2020-02-04 PROCEDURE — 99213 OFFICE O/P EST LOW 20 MIN: CPT | Mod: 25 | Performed by: NURSE PRACTITIONER

## 2020-02-04 PROCEDURE — 90686 IIV4 VACC NO PRSV 0.5 ML IM: CPT | Performed by: NURSE PRACTITIONER

## 2020-02-04 PROCEDURE — 99396 PREV VISIT EST AGE 40-64: CPT | Mod: 25 | Performed by: NURSE PRACTITIONER

## 2020-02-04 PROCEDURE — 80061 LIPID PANEL: CPT | Performed by: NURSE PRACTITIONER

## 2020-02-04 PROCEDURE — 82947 ASSAY GLUCOSE BLOOD QUANT: CPT | Performed by: NURSE PRACTITIONER

## 2020-02-04 RX ORDER — MEDROXYPROGESTERONE ACETATE 150 MG/ML
150 INJECTION, SUSPENSION INTRAMUSCULAR
Status: DISCONTINUED | OUTPATIENT
Start: 2020-02-04 | End: 2020-08-18

## 2020-02-04 RX ORDER — MEDROXYPROGESTERONE ACETATE 150 MG/ML
150 INJECTION, SUSPENSION INTRAMUSCULAR
Qty: 1 ML | Refills: 0 | Status: CANCELLED | OUTPATIENT
Start: 2020-02-04

## 2020-02-04 ASSESSMENT — MIFFLIN-ST. JEOR: SCORE: 1396.17

## 2020-02-04 NOTE — PROGRESS NOTES
SUBJECTIVE:   CC: Delicia Henry is an 39 year old woman who presents for preventive health visit.     Healthy Habits:      Do you get at least three servings of calcium containing foods daily (dairy, green leafy vegetables, etc.)? yes    Amount of exercise or daily activities, outside of work: 6 day(s) per week    Problems taking medications regularly not applicable    Medication side effects: No    Have you had an eye exam in the past two years? no    Do you see a dentist twice per year? no    Do you have sleep apnea, excessive snoring or daytime drowsiness?no      Patient would like a renewal prescription for her depo shot. She had been on depo between her 17 year old child and 6 year old. She then trialed the birth control pill which she reports caused significant hair loss. She has now been on depo for 1.5 years. She likes that it has stopped her from getting a period. She declines a [facility] of osteoporosis and no history of fractures or depression. She does not take a calcium or vitamin D supplement.             Today's PHQ-2 Score:   PHQ-2 ( 1999 Pf[employer]) 8/7/2017 1/4/2013   Q1: Little interest or pleasure in doing things 0 0   Q2: Feeling down, depressed or hopeless 0 0   PHQ-2 Score 0 0       Abuse: Current or Past(Physical, Sexual or Emotional)- No  Do you feel safe in your environment? Yes        Social History     Tobacco Use     Smoking status: Never Smoker     Smokeless tobacco: Never Used   Substance Use Topics     Alcohol use: No     If you drink alcohol do you typically have >3 drinks per day or >7 drinks per week? No                     Reviewed orders with patient.  Reviewed health maintenance and updated orders accordingly - Yes      Mammogram Screening: Patient under age 50, mutual decision reflected in health maintenance. Patient does not have a family history of breast cancer or symptoms. She would like to get a mammogram this year and then potentially consider every 2  years.    Pertinent mammograms are reviewed under the imaging tab.    History of abnormal Pap smear: NO - age 30-65 PAP every 5 years with negative HPV co-testing recommended. Patient gets PAP's done at her OB clinic.  PAP / HPV Latest Ref Rng & Units 2017 2013 5/3/2011   PAP - NIL NIL NIL   HPV 16 DNA NEG Negative - -   HPV 18 DNA NEG Negative - -   OTHER HR HPV NEG Negative - -     Reviewed and updated as needed this visit by clinical staff  Tobacco  Allergies  Meds         Reviewed and updated as needed this visit by Provider        Past Medical History:   Diagnosis Date     Asthma     exercise induced asthma     MEDICAL HISTORY OF -     recurrent uti's urethral dilitation , tigonitis.     Molar pregnancy       Past Surgical History:   Procedure Laterality Date     DILATION AND CURETTAGE SUCTION  2012    Procedure:DILATION AND CURETTAGE SUCTION; DILATION AND CURETTAGE SUCTION ; Surgeon:LEIGHTON GALLAGHER; Location:[facility] OR     ENT SURGERY      tumor rem under ear, benign     OB History    Para Term  AB Living   5 4 4 0 1 4   SAB TAB Ectopic Multiple Live Births   1 0 0 0 4      # Outcome Date GA Lbr Scottie/2nd Weight Sex Delivery Anes PTL Lv   5 Term 17 38w5d 01:30 / 00:47 2.98 kg (6 lb 9.1 oz) F Vag-Spont Local N ANANYA      Name: CORNELIO HAYWOOD      Apgar1: 9  Apgar5: 10   4 Term 14 37w1d 02:35 / 00:11 2.81 kg (6 lb 3.1 oz) M Vag-Spont IV REGIONAL N ANANYA      Name: CORNELIO GALINDO L      Apgar1: 8  Apgar5: 9   3 Term 03 38w0d 02:00 2.863 kg (6 lb 5 oz) M    ANANYA      Name: Heriberto   2 Term 99 38w0d  3.062 kg (6 lb 12 oz) M Vag-Spont   ANANYA      Name: Luke   1 SAB  8w0d              ROS:  CONSTITUTIONAL: NEGATIVE for fever, chills, change in weight  INTEGUMENTARU/SKIN: NEGATIVE for worrisome rashes, moles or lesions  EYES: NEGATIVE for vision changes or irritation  ENT: NEGATIVE for ear, mouth and throat problems  RESP: NEGATIVE for significant cough  "or SOB  BREAST: NEGATIVE for masses, tenderness or discharge  CV: NEGATIVE for chest pain, palpitations or peripheral edema  GI: NEGATIVE for nausea, abdominal pain, heartburn, or change in bowel habits  : NEGATIVE for unusual urinary or vaginal symptoms. Periods, not having one due to depo  MUSCULOSKELETAL: NEGATIVE for significant arthralgias or myalgia  NEURO: NEGATIVE for weakness, dizziness or paresthesias  PSYCHIATRIC: NEGATIVE for changes in mood or affect    OBJECTIVE:   /72 (BP Location: Right arm, Patient Position: Sitting, Cuff Size: Adult Regular)   Pulse 84   Temp 98.4  F (36.9  C) (Oral)   Ht 1.702 m (5' 7\")   Wt 69.9 kg (154 lb)   SpO2 99%   BMI 24.12 kg/m    EXAM:  GENERAL: healthy, alert and no acute distress  EYES: Eyes grossly normal to inspection, PERRL and conjunctivae and sclerae normal  HENT: ear canals and TM's normal, nose and mouth without ulcers or lesions  NECK: no adenopathy, no asymmetry, masses, or scars and thyroid normal to palpation  RESP: lungs clear to auscultation - no rales, rhonchi or wheezes  CV: regular rate and rhythm, normal S1 S2, no S3 or S4, no murmur, click or rub, no peripheral edema and peripheral pulses strong  ABDOMEN: soft, nontender, no hepatosplenomegaly, no masses and bowel sounds normal  MS: no gross musculoskeletal defects noted, no edema  NEURO: mentation intact and speech normal  PSYCH: mentation appears normal, affect normal/bright    Diagnostic Test Results:  No results found for this or any previous visit (from the past 24 hour(s)).    ASSESSMENT/PLAN:   1. Routine general medical examination at a health care facility    - INFLUENZA VACCINE IM > 6 MONTHS VALENT IIV4 [44211]    2. Encounter for screening mammogram for breast cancer  First time mammogram without symptoms  - *MA Screening Digital Bilateral; Future    3. Encounter for surveillance of injectable contraceptive  Currently on Depo, discussed concern of bone density, weight gain and " "mood disturbance as side effects. Ordered repeat depo shot for March and discussed transitioning to the nexplanon. Also prescribed calcium and vitamin d to help mitigate the bone density loss from the depo shot.  - calcium carbonate-vitamin D (OS-[place]) 500-400 MG-UNIT tablet; Take 1 tablet by mouth 2 times daily  Dispense: 180 tablet; Refill: 3  - medroxyPROGESTERone (DEPO-PROVERA) injection 150 mg    4. Encounter for other general counseling or advice on contraception  Discussed all contraception options, patient does not want an IUD or the pill. Would like something that will keep her from getting a period like the depo. Hesitant to continue depo with this patient because of her 11.5 years on depo and the current best practice of not utilizing depo more than 10 years because of the loss of bone density and weight gain association. Discussed placing nexplanon 2 weeks before her next depo in June. Did explain that unpredictable spotting is the most common reason for people not continuing with the nexplanon. Sent patient with nexplanon information.     5. Screening for diabetes mellitus  Father and grandfather have diabetes.  - Glucose    6. Lipid screening  No history of hyperlipidemia or FMH  - Lipid panel reflex to direct LDL Fasting    7. Screening for thyroid disorder  Mother and sister have thyroid disease, patient reports hair loss and coarse texture in the past year.  - TSH with free T4 reflex        COUNSELING:   Reviewed preventive health counseling, as reflected in patient instructions       Regular exercise       Immunizations    Vaccinated for: Influenza             Contraception       Osteoporosis Prevention/Bone Health    Estimated body mass index is 24.12 kg/m  as calculated from the following:    Height as of this encounter: 1.702 m (5' 7\").    Weight as of this encounter: 69.9 kg (154 lb).         reports that she has never smoked. She has never used smokeless tobacco.      Counseling Resources:  ATP " IV Guidelines  Pooled Cohorts Equation Calculator  Breast Cancer Risk Calculator  FRAX Risk Assessment  ICSI Preventive Guidelines  Dietary Guidelines for Americans, 2010  USDA's MyPlate  ASA Prophylaxis  Lung CA Screening    Makayla Bills RN, Student FNP, [employer]    History and physical examination done with student nurse practitioner.  Student acted as scribe for this encounter.  I agree with assessment and plan for this patient.     MINH Lockwood CNP  [facility]

## 2020-02-04 NOTE — LETTER
February 6, 2020      Delicia Henry  [address]  [place] [place] [facility] [address]        Dear ,    We are writing to inform you of your test results.    -Cholesterol levels (LDL,HDL, Triglycerides) are normal.  ADVISE: rechecking in 1 year.   -Glucose (diabetic screening test) is normal.   -TSH (thyroid stimulating hormone) level is normal which indicates normal thyroid function.     Resulted Orders   Glucose   Result Value Ref Range    Glucose 75 70 - 99 mg/dL      Comment:      Fasting specimen   TSH with free T4 reflex   Result Value Ref Range    TSH 1.64 0.40 - 4.00 mU/L   Lipid panel reflex to direct LDL Fasting   Result Value Ref Range    Cholesterol 135 <200 mg/dL    Triglycerides 51 <150 mg/dL      Comment:      Fasting specimen    HDL Cholesterol 59 >49 mg/dL    LDL Cholesterol Calculated 66 <100 mg/dL      Comment:      Desirable:       <100 mg/dl    Non HDL Cholesterol 76 <130 mg/dL       If you have any questions or concerns, please call the clinic at the number listed above.       Sincerely,      Crissy Rutherford, MINH CNP

## 2020-02-04 NOTE — PATIENT INSTRUCTIONS
Preventive Health Recommendations  Female Ages 40 to 49    Yearly exam:     See your health care provider every year in order to  1. Review health changes.   2. Discuss preventive care.    3. Review your medicines if your doctor prescribed any.      Get a Pap test every three years (unless you have an abnormal result and your provider advises testing more often).      If you get Pap tests with HPV test, you only need to test every 5 years, unless you have an abnormal result. You do not need a Pap test if your uterus was removed (hysterectomy) and you have not had cancer.      You should be tested each year for STDs (sexually transmitted diseases), if you're at risk.     Ask your doctor if you should have a mammogram.      Have a colonoscopy (test for colon cancer) if someone in your family has had colon cancer or polyps before age 50.       Have a cholesterol test every 5 years.       Have a diabetes test (fasting glucose) after age 45. If you are at risk for diabetes, you should have this test every 3 years.    Shots: Get a flu shot each year. Get a tetanus shot every 10 years.     Nutrition:     Eat at least 5 servings of fruits and vegetables each day.    Eat whole-grain bread, whole-wheat pasta and brown rice instead of white grains and rice.    Get adequate Calcium and Vitamin D.      Lifestyle    Exercise at least 150 minutes a week (an average of 30 minutes a day, 5 days a week). This will help you control your weight and prevent disease.    Limit alcohol to one drink per day.    No smoking.     Wear sunscreen to prevent skin cancer.    See your dentist every six months for an exam and cleaning.  Patient Education     Etonogestrel implant  What is this medicine?  ETONOGESTREL (et oh teena NONI trel) is a contraceptive (birth control) device. It is used to prevent pregnancy. It can be used for up to 3 years.  How should I use this medicine?  This device is inserted just under the skin on the inner side of your  upper arm by a health care professional.  Talk to your pediatrician regarding the use of this medicine in children. Special care may be needed.  What side effects may I notice from receiving this medicine?  Side effects that you should report to your doctor or health care professional as soon as possible:  allergic reactions like skin rash, itching or hives, swelling of the face, lips, or tongue  breast lumps  changes in emotions or moods  depressed mood  heavy or prolonged menstrual bleeding  pain, irritation, swelling, or bruising at the insertion site  scar at site of insertion  signs of infection at the insertion site such as fever, and skin redness, pain or discharge  signs of pregnancy  signs and symptoms of a blood clot such as breathing problems; changes in vision; chest pain; severe, sudden headache; pain, swelling, warmth in the leg; trouble speaking; sudden numbness or weakness of the face, arm or leg  signs and symptoms of liver injury like dark yellow or brown urine; general ill feeling or flu-like symptoms; light-colored stools; loss of appetite; nausea; right upper belly pain; unusually weak or tired; yellowing of the eyes or skin  unusual vaginal bleeding, discharge  signs and symptoms of a stroke like changes in vision; confusion; trouble speaking or understanding; severe headaches; sudden numbness or weakness of the face, arm or leg; trouble walking; dizziness; loss of balance or coordination  Side effects that usually do not require medical attention (report to your doctor or health care professional if they continue or are bothersome):  acne  back pain  breast pain  changes in weight  dizziness  general ill feeling or flu-like symptoms  headache  irregular menstrual bleeding  nausea  sore throat  vaginal irritation or inflammation  What may interact with this medicine?  Do not take this medicine with any of the following medications:  amprenavir  fosamprenavir  This medicine may also interact  with the following medications:  acitretin  aprepitant  armodafinil  bexarotene  bosentan  carbamazepine  certain medicines for fungal infections like fluconazole, ketoconazole, itraconazole and voriconazole  certain medicines to treat hepatitis, HIV or AIDS  cyclosporine  felbamate  griseofulvin  lamotrigine  modafinil  oxcarbazepine  phenobarbital  phenytoin  primidone  rifabutin  rifampin  rifapentine  Jenna's wort  topiramate  What if I miss a dose?  This does not apply.  Where should I keep my medicine?  This drug is given in a hospital or clinic and will not be stored at home.  What should I tell my health care provider before I take this medicine?  They need to know if you have any of these conditions:  abnormal vaginal bleeding  blood vessel disease or blood clots  breast, cervical, endometrial, ovarian, liver, or uterine cancer  diabetes  gallbladder disease  heart disease or recent heart attack  high blood pressure  high cholesterol or triglycerides  kidney disease  liver disease  migraine headaches  seizures  stroke  tobacco smoker  an unusual or allergic reaction to etonogestrel, anesthetics or antiseptics, other medicines, foods, dyes, or preservatives  pregnant or trying to get pregnant  breast-feeding  What should I watch for while using this medicine?  This product does not protect you against HIV infection (AIDS) or other sexually transmitted diseases.  You should be able to feel the implant by pressing your fingertips over the skin where it was inserted. Contact your doctor if you cannot feel the implant, and use a non-hormonal birth control method (such as condoms) until your doctor confirms that the implant is in place. Contact your doctor if you think that the implant may have broken or become bent while in your arm.  You will receive a user card from your health care provider after the implant is inserted. The card is a record of the location of the implant in your upper arm and when it  should be removed. Keep this card with your health records.  NOTE:This sheet is a summary. It may not cover all possible information. If you have questions about this medicine, talk to your doctor, pharmacist, or health care provider. Copyright  2019 [employer]

## 2020-02-05 LAB
CHOLEST SERPL-MCNC: 135 MG/DL
GLUCOSE SERPL-MCNC: 75 MG/DL (ref 70–99)
HDLC SERPL-MCNC: 59 MG/DL
LDLC SERPL CALC-MCNC: 66 MG/DL
NONHDLC SERPL-MCNC: 76 MG/DL
TRIGL SERPL-MCNC: 51 MG/DL
TSH SERPL DL<=0.005 MIU/L-ACNC: 1.64 MU/L (ref 0.4–4)

## 2020-03-05 ENCOUNTER — ALLIED HEALTH/NURSE VISIT (OUTPATIENT)
Dept: NURSING | Facility: CLINIC | Age: 40
End: 2020-03-05
Payer: COMMERCIAL

## 2020-03-05 DIAGNOSIS — Z30.9 ENCOUNTER FOR CONTRACEPTIVE MANAGEMENT, UNSPECIFIED TYPE: Primary | ICD-10-CM

## 2020-03-05 PROCEDURE — 96372 THER/PROPH/DIAG INJ SC/IM: CPT

## 2020-03-05 RX ADMIN — MEDROXYPROGESTERONE ACETATE 150 MG: 150 INJECTION, SUSPENSION INTRAMUSCULAR at 16:15

## 2020-03-05 NOTE — PROGRESS NOTES
Clinic Administered Medication Documentation      Depo Provera Documentation    URINE HCG: not indicated    Depo-Provera Standing Order inclusion/exclusion criteria reviewed.   Patient meets: inclusion criteria     BP: Data Unavailable  LAST PAP/EXAM:   Lab Results   Component Value Date    PAP NIL 07/05/2017       Prior to injection, verified patient identity using patient's name and date of birth. Medication was administered. Please see MAR and medication order for additional information.     Was entire vial of medication used? Yes  Vial/Syringe: Single dose vial  Expiration Date:  10/20    Patient instructed to remain in clinic for 15 minutes and report any adverse reaction to staff immediately .  NEXT INJECTION DUE: 5/21/20 - 6/4/20

## 2020-03-27 ENCOUNTER — VIRTUAL VISIT (OUTPATIENT)
Dept: FAMILY MEDICINE | Facility: OTHER | Age: 40
End: 2020-03-27
Payer: COMMERCIAL

## 2020-03-27 PROCEDURE — 99421 OL DIG E/M SVC 5-10 MIN: CPT | Performed by: NURSE PRACTITIONER

## 2020-03-27 NOTE — PROGRESS NOTES
"Date: 2020 16:43:30  Clinician: Elizabeth Niño  Clinician NPI: [id number]  Patient: Delicia Henry  Patient : 1980  Patient Address: [address] [place] [address], [place] [address]  Patient Phone: [phone]  Visit Protocol: URI  Patient Summary:  Delicia is a 39 year old ( : 1980 ) female who initiated a Visit for COVID-19 (Coronavirus) evaluation and screening. When asked the question \"Please sign me up to receive news, health information and promotions from [employer].\", Delicia responded \"No\".    Delicia states her symptoms started gradually 7-9 days ago. After her symptoms started, they improved and then got worse again.   Her symptoms consist of facial pain or pressure. She is experiencing mild difficulty breathing with activities but can speak normally in full sentences.   Symptom details   Facial pain or pressure: The facial pain or pressure does not feel worse when bending or leaning forward.    Delicia denies having ear pain, rhinitis, wheezing, sore throat, cough, nasal congestion, malaise, enlarged lymph nodes, teeth pain, headache, fever, myalgias, and chills. She also denies taking antibiotic medication for the symptoms, having recent facial or sinus surgery in the past 60 days, and having a sinus infection within the past year.    Pertinent COVID-19 (Coronavirus) information  Delicia has not traveled internationally or to the areas where COVID-19 (Coronavirus) is widespread, including cruise ship travel in the last 14 days before the start of her symptoms.   Delicia has not had a close contact with a laboratory-confirmed COVID-19 patient within 14 days of symptom onset. She also has not had a close contact with a suspected COVID-19 patient within 14 days of symptom onset.   Delicia is not a healthcare worker or a  and does not work in a healthcare facility. She does not live with a healthcare worker.   Pertinent medical history  Delicia does not get yeast " infections when she takes antibiotics.   Delicia does not need a return to work/school note.   Weight: 148 lbs   Delicia does not smoke or use smokeless tobacco.   She denies pregnancy and denies breastfeeding. She does not menstruate.   Additional information as reported by the patient (free text): Tightness in chest and middle back pain, tingle in hands and face.   Weight: 148 lbs  A synchronous phone visit was initiated by the provider for the following reason: clarification of chest tightness    MEDICATIONS: No current medications, ALLERGIES: erythromycin base  Clinician Response:  Dear Delicia,   Based on the information you have provided, further evaluation in urgent care is indicated. You may be seen in one of our designated urgent care sites that is prepared to see patients who have symptoms that could indicate Coronavirus (Covid-19).   For your information: At this time we will NOT perform coronavirus testing in urgent care sites. This test is currently being reserved for patients who are being admitted to the hospital.  Our locations: [facility], [place], [facility], [facility]), [place] and [place]  Please call [address] [place] [address] ([phone]) to schedule an urgent care appointment at one of our urgent care or walk in care sites. It is essential that you tell them to let the  know that they were referred to be seen in urgent care from [facility]  PLEASE BRING DOCUMENTATION FROM THIS COMPLETED [facility] VISIT TO YOUR URGENT CARE VISIT.     For more information about COVID19 and options for caring for yourself at home, please visit the CDC website at https://www.cdc.gov/coronavirus/2019-ncov/about/steps-when-sick.html  For more options for care at [facility], please visit our website at [email or web address]    COVID-19 (Coronavirus) General Information  With the increase in the number of COVID-19 (Coronavirus) cases,  we understand you may have some questions. Below is some helpful information on COVID-19 (Coronavirus).  How can I protect myself and others from the COVID-19 (Coronavirus)?  Because there is currently no vaccine to prevent infection, the best way to protect yourself is to avoid being exposed to this virus. Put distance between yourself and other people if COVID-19 (Coronavirus) is spreading in your community. The virus is thought to spread mainly from person-to-person.     Between people who are in close contact with one another (within about 6 about) for a prolonged period (10 minutes or longer).    Through respiratory droplets produced when an infected person coughs or sneezes.     The CDC recommends the following additional steps to protect yourself and others:     Wash your hands often with soap and water for at least 20 seconds, especially after blowing your nose, coughing, or sneezing; going to the bathroom; and before eating or preparing food.  Use an alcohol-based hand  that contains at least 60 percent alcohol if soap and water are not available.        Avoid touching your eyes, nose and mouth with unwashed hands.    Avoid close contact with people who are sick.    Stay home when you are sick.    Cover your cough or sneeze with a tissue, then throw the tissue in the trash.    Clean and disinfect frequently touched objects and surfaces.     You can help stop COVID-19 (Coronavirus) by knowing the signs and symptoms:     Fever    Cough    Shortness of breath     Contact your healthcare provider if   Develop symptoms   AND   Have been in close contact with a person known to have COVID-19 (Coronavirus) or live in or have recently traveled from an area with ongoing spread of COVID-19 (Coronavirus). Call ahead before you go to a doctor's office or emergency room. Tell them about your recent travel and your symptoms.   For the most up to date information, visit the CDC's website.  Self-monitoring   Self-monitoring means people should monitor themselves for fever by taking their temperatures twice a day and remain alert for a cough or difficulty breathing.  It is important to check your health two times each day for 14 days after a potential exposure to a person with COVID-19 (Coronavirus) or after travel from a location where COVID-19 (Coronavirus) is widespread. If you have been exposed to a person with COVID-19 (Coronavirus), it may take up to 14 days to know if you will get sick. Follow the steps below to check and record your health.     Take your temperature with a thermometer twice a day, once in the morning and once in the evening, and watch for a cough or difficulty breathing for 14 days.    Write down your temperature and any COVID-19 symptoms you may have: feeling feverish, coughing, or difficulty breathing.    Stay home from work or school.    Do not take public transportation, taxis, or ride-shares.    Avoid crowded places (such as shopping centers and movie theaters) and limit your activities in public.    Keep your distance from others (about 6 feet or 2 meters).    If you get sick with fever, cough, or trouble breathing, contact your healthcare provider and tell them about your recent travel and/or your symptoms.    If you need to seek medical care for other reasons, such as dialysis, call ahead to your doctor and tell them about your recent travel.     Steps to help prevent the spread of COVID-19 (Coronavirus) if you are sick  If you are sick with COVID-19 (Coronavirus) or suspect you are infected with the virus that causes COVID-19 (Coronavirus), follow the steps below to help prevent the disease from spreading&nbsp;to people in your home and community.     Stay home except to get medical care. Home isolation may be started in consultation with your healthcare clinician.    Separate yourself from other people and animals in your home.    Call ahead before visiting your doctor if you have a  "medical appointment.    Wear a facemask when you are around other people.    Cover your cough and sneezes.    Clean your hands often.    Avoid sharing personal household items.    Clean and disinfect frequently touched objects and surfaces everyday.    You will need to have someone drop off medications or household supplies (if needed) at your house without coming inside or in contact with you or others living in your house.    Monitor your symptoms and seek prompt medical care if your illness is worsening (e.g. Difficulty breathing).    Discontinue home isolation only in consultation with your healthcare provider.     For more detailed and up to date information on what to do if you are sick, visit this link: What to Do If You Are Sick With COVID-19.  Do I need to be tested for COVID-19 (Coronavirus)?     Not everyone needs to be tested for COVID-19 (Coronavirus). Decisions on which patients receive testing will be based on the local spread of COVID-19 (Coronavirus) as well as the symptoms. Your healthcare provider will make the final decision on whether you should be tested.    In the meantime, if you have concerns that you may have been exposed, it is reasonable to practice \"social distancing.\"&nbsp; If you are ill with a cold or flu-like illness, please monitor your symptoms and call your healthcare provider if your symptoms worsen.    For more up to date information, visit this link: COVID-19 (Coronavirus) Frequently Asked Questions and Answers.      Diagnosis: Cough  Diagnosis ICD: R05  Triage Notes: I reviewed the patient's history, verified their identity, and explained the Visit process.    Please proceed to urgent care as soon as possible for chest tightness.  Synchronous Triage: phone, status: completed, duration: 259 seconds  "

## 2020-03-29 ENCOUNTER — VIRTUAL VISIT (OUTPATIENT)
Dept: FAMILY MEDICINE | Facility: OTHER | Age: 40
End: 2020-03-29
Payer: COMMERCIAL

## 2020-03-29 PROCEDURE — 99421 OL DIG E/M SVC 5-10 MIN: CPT | Performed by: INTERNAL MEDICINE

## 2020-03-29 NOTE — PROGRESS NOTES
"Date: 2020 17:35:18  Clinician: Joanna Macdonald  Clinician NPI: [id number]  Patient: Delicia Henry  Patient : 1980  Patient Address: [address] [place] [address], [place] [address]  Patient Phone: [phone]  Visit Protocol: URI  Patient Summary:  Delicia is a 39 year old ( : 1980 ) female who initiated a Visit for COVID-19 (Coronavirus) evaluation and screening. When asked the question \"Please sign me up to receive news, health information and promotions from [employer].\", Delicia responded \"No\".    Delicia states her symptoms started gradually 10-13 days ago. After her symptoms started, they improved and then got worse again.   Her symptoms consist of a cough and malaise. She is experiencing mild difficulty breathing with activities but can speak normally in full sentences. Delicia also feels feverish.   Symptom details     Cough: Delicia coughs a few times an hour and her cough is not more bothersome at night. Phlegm does not come into her throat when she coughs. She does not believe her cough is caused by post-nasal drip.     Temperature: Her current temperature is 98.6 degrees Fahrenheit.      Delicia denies having ear pain, rhinitis, teeth pain, headache, facial pain or pressure, myalgias, chills, wheezing, sore throat, nasal congestion, and enlarged lymph nodes. She also denies having a sinus infection within the past year, taking antibiotic medication for the symptoms, and having recent facial or sinus surgery in the past 60 days.   Precipitating events  She has not recently been exposed to someone with influenza. Delicia has been in close contact with the following high risk individuals: children under the age of 5.   Pertinent COVID-19 (Coronavirus) information  Delicia has not traveled internationally or to the areas where COVID-19 (Coronavirus) is widespread, including cruise ship travel in the last 14 days before the start of her symptoms.   Delicia has not had a close contact " with a laboratory-confirmed COVID-19 patient within 14 days of symptom onset. She also has not had a close contact with a suspected COVID-19 patient within 14 days of symptom onset.   Delicia is not a healthcare worker or a  and does not work in a healthcare facility. She does not live with a healthcare worker.   Pertinent medical history  Delicia does not get yeast infections when she takes antibiotics.   Delicia does not need a return to work/school note.   Weight: 149 lbs   Delicia does not smoke or use smokeless tobacco.   She denies pregnancy and denies breastfeeding. She does not menstruate.   Additional information as reported by the patient (free text): Concerned about tingly in face and arms and hands. Can this be a cause of low oxygen? Breathing is better today but still tingly in arms hands and face and dizzy light headed when I walk around for a while   Weight: 149 lbs    MEDICATIONS: No current medications, ALLERGIES: erythromycin base  Clinician Response:  Dear Delicia,   Based on the information you have provided, you do have symptoms that are consistent with Coronavirus (COVID-19).  The coronavirus causes mild to severe respiratory illness with the most common symptoms including fever, cough and difficulty breathing. Unfortunately, many viruses cause similar symptoms and it can be difficult to distinguish between viruses, especially in mild cases, so we are presuming that anyone with cough or fever has coronavirus at this time.  Coronavirus/COVID-19 has reached the point of community spread in [place], meaning that we are finding the virus in people with no known exposure risk for neil the virus. Given the increasing commonness of coronavirus in the community we are no longer testing patients who are not critically ill.  If you are a health care worker, you should refer to your employee health office for instructions about testing and returning to work.  For everyone else who  has cough or fever, you should assume you are infected with coronavirus. Since you will not be tested but have symptoms that may be consistent with coronavirus, the CDC recommends you stay in self-isolation until these three things have happened:    You have had no fever for at least 72 hours (that is three full days of no fever without the use of medicine that reduces fevers)    AND   Other symptoms have improved (for example, when your cough or shortness of breath have improved)   AND   At least 7 days have passed since your symptoms first appeared.   How to Isolate:   Isolate yourself at home.  Do Not allow any visitors  Do Not go to work or school  Do Not go to [employer],  centers, shopping, or other public places.  Do Not shake hands.  Avoid close contact with others (hugging, kissing).   Protect Others:   Cover Your Mouth and Nose with a mask, disposable tissue or wash cloth to avoid spreading germs to others.  Wash your hands and face frequently with soap and water.   We know it can be scary to hear that you might have COVID-19. Our team can help track your symptoms and make sure you are doing ok over the next two weeks using a program called [employer] to keep in touch. When you receive an email from [employer], please consider enrolling in our monitoring program. There is no cost to you for monitoring. Here is a URL where you can learn more: [email or web address]  Managing Symptoms:   At this time, we primarily recommend Tylenol (Acetaminophen) for fever or pain. If you have liver or kidney problems, contact your primary care provider for instructions on use of tylenol. Adults can take 650 mg (two 325 mg pills) by mouth every 4-6 hours as needed OR 1,000 mg (two 500 mg pills) every 8 hours as needed. MAXIMUM DAILY DOSE: 3,000mg. For children, refer to dosing on bottle based on age or weight.   If you develop significant shortness of breath that prevents you from doing normal activities,  please call 911 or proceed to the nearest emergency room and alert them immediately that you have been in self-isolation for possible coronavirus.  If you have a higher risk medical condition such as cancer, heart failure, end stage renal disease on dialysis or have a transplant, please reach out to your specialist's clinic to advise them of your [facility] visit should you not improve within the next two days.   For more information about COVID19 and options for caring for yourself at home, please visit the CDC website at https://www.cdc.gov/coronavirus/2019-ncov/about/steps-when-sick.htmlFor more options for care at [facility], please visit our website at [email or web address]    Diagnosis: Acute upper respiratory infection, unspecified  Diagnosis ICD: J06.9  Additional Clinician Notes: Tingling in your arms, hands, and face and lightheadedness are likely related to your current illness (presumes COVID-19).

## 2020-03-31 ENCOUNTER — VIRTUAL VISIT (OUTPATIENT)
Dept: FAMILY MEDICINE | Facility: OTHER | Age: 40
End: 2020-03-31

## 2020-03-31 ENCOUNTER — NURSE TRIAGE (OUTPATIENT)
Dept: NURSING | Facility: CLINIC | Age: 40
End: 2020-03-31

## 2020-03-31 NOTE — PROGRESS NOTES
"Date: 2020 18:17:33  Clinician: Stephania Valladares  Clinician NPI: [id number]  Patient: Delicia Henry  Patient : 1980  Patient Address: [address] [place] [address], [place] [address]  Patient Phone: [phone]  Visit Protocol: URI  Patient Summary:  Delicia is a 39 year old ( : 1980 ) female who initiated a Visit for COVID-19 (Coronavirus) evaluation and screening. When asked the question \"Please sign me up to receive news, health information and promotions from [employer].\", Delicia responded \"No\".    Delicia states her symptoms started gradually 7-9 days ago. After her symptoms started, they improved and then got worse again.   Her symptoms consist of malaise. She is experiencing mild difficulty breathing with activities but can speak normally in full sentences.   Delicia denies having rhinitis, teeth pain, headache, fever, facial pain or pressure, myalgias, chills, wheezing, sore throat, cough, nasal congestion, enlarged lymph nodes, and ear pain. She also denies taking antibiotic medication for the symptoms and having recent facial or sinus surgery in the past 60 days.    Pertinent COVID-19 (Coronavirus) information  Delicia has not traveled internationally or to the areas where COVID-19 (Coronavirus) is widespread, including cruise ship travel in the last 14 days before the start of her symptoms.   Delicia has not had a close contact with a laboratory-confirmed COVID-19 patient within 14 days of symptom onset. She also has not had a close contact with a suspected COVID-19 patient within 14 days of symptom onset.   Delicia is not a healthcare worker or a  and does not work in a healthcare facility. She does not live with a healthcare worker.   Pertinent medical history  Delicia does not get yeast infections when she takes antibiotics.   Delicia does not need a return to work/school note.   Weight: 149 lbs   Delicia does not smoke or use smokeless tobacco.   She denies " pregnancy and denies breastfeeding. She does not menstruate.   Additional information as reported by the patient (free text): I have been speaking with you guys the past few days, I started feeling much better today other than tired and shaky , all of a sudden at  about 545p my heart rate shot up to 140. And then bounced between 98 and 120 for about 10 minutes, I called the hospital and didn't receive any help they said because it's down now I'm fine. Should I be worried about this.  I felt like I could pas out if I stood up,  it's fine now but I'm obviously very concerned and this seems to be the place I get a respons   Weight: 149 lbs  A synchronous phone visit was initiated by the provider for the following reason: 4th [facility] in 4 days    MEDICATIONS: No current medications, ALLERGIES: erythromycin base  Clinician Response:  Khanh Nesbitt,   Manny Nesbitt,&nbsp;  It was great to talk to you. I hope you start feeling better soon. I'm not too concerned about the HR rising to 140 for a brief period especially in women with your level of fitness and in the absence of other new or concerning symptoms. Try to get some rest, stay hydrated, and eat well balanced meals with nutrients.&nbsp;    Diagnosis: Counseling, unspecified  Diagnosis ICD: Z71.9  Triage Notes: I reviewed the patient's history, verified their identity, and explained the Visit process.    Laying watching a movie now, Friday bad bad episode couldn't breathe. Sat / Sun feeling slightly better. Monday laid out in Monday // took care of kids. Then today feeling really worn out today, over did it yesterday. It's been about a week of this. Maybe a little longer. Bad headaches the week before. Today she was feeling great other than exhausted // shaky. Has felt shaky (not cold, no fever, just shaky). Lay in bed today, watching Netflix, heart pounding, looked at [employer] Watch & it his 140, couldn't take a breath. Went to stand up to see if moving would help, called  .  related to anxiety. Now at 91.     Lots of counseling related to anxiety done. Review of systems for other new or concerning symptoms was negative.  Synchronous Triage: phone, status: completed, duration: 602 seconds

## 2020-03-31 NOTE — PROGRESS NOTES
"Date: 2020 13:22:15  Clinician: Odette Torres  Clinician NPI: [id number]  Patient: Delicia Henry  Patient : 1980  Patient Address: [address] [place] [address], [place] [address]  Patient Phone: [phone]  Visit Protocol: URI  Patient Summary:  Delicia is a 39 year old ( : 1980 ) female who initiated a Visit for COVID-19 (Coronavirus) evaluation and screening. When asked the question \"Please sign me up to receive news, health information and promotions from [employer].\", Delicia responded \"No\".    Delicia states her symptoms started gradually 7-9 days ago. After her symptoms started, they improved and then got worse again.   Her symptoms consist of malaise. She is experiencing mild difficulty breathing with activities but can speak normally in full sentences. Delicia also feels feverish.   Symptom details   Temperature: Her current temperature is 98.6 degrees Fahrenheit.    Delicia denies having rhinitis, teeth pain, headache, facial pain or pressure, myalgias, chills, wheezing, sore throat, cough, nasal congestion, enlarged lymph nodes, and ear pain. She also denies having a sinus infection within the past year, taking antibiotic medication for the symptoms, and having recent facial or sinus surgery in the past 60 days.    Pertinent COVID-19 (Coronavirus) information  Delicia has not traveled internationally or to the areas where COVID-19 (Coronavirus) is widespread, including cruise ship travel in the last 14 days before the start of her symptoms.   Delicia has not had a close contact with a laboratory-confirmed COVID-19 patient within 14 days of symptom onset. She also has not had a close contact with a suspected COVID-19 patient within 14 days of symptom onset.   Delicia is not a healthcare worker or a  and does not work in a healthcare facility. She does not live with a healthcare worker.   Pertinent medical history  Delicia does not get yeast infections when she takes " antibiotics.   Delicia does not need a return to work/school note.   Weight: 148 lbs   Delicia does not smoke or use smokeless tobacco.   She denies pregnancy and denies breastfeeding. She does not menstruate.   Additional information as reported by the patient (free text): Short of breath when I walk up the stairs, tingling still in arms and face,  I feel shaky in my arms and body, no chills just shaky. Very tired,  Is this a concern? Should this be getting better daily?  Could this be something I should be seen for?   Weight: 148 lbs    MEDICATIONS: No current medications, ALLERGIES: erythromycin base  Clinician Response:  Dear Delicia,   I'm sorry you are not feeling well.&nbsp; Because you do have respiratory symptoms, we must assume at this time you could very well be positive for COVID.&nbsp; Unfortunately with this illness we are seeing prolonged course in some patients of 2-3 weeks.&nbsp; I am not concerned that at this point you are still feeling very fatigued and shaky.&nbsp; Shortness of breath is very common as well and something we need to monitor carefully with you.&nbsp; At this point as you are only experiencing shortness of breath with activity (going up stairs), I do not think we need to have you seen in urgent care.&nbsp; If at any point you develop shortness of breath when you are just sitting and resting or you begin to have such difficulty that you can't complete a sentence without pausing to breathe, that would be an indication that you need to submit another [facility] visit. We would then give you a call to assess you over the phone and see if we need to send you in for an in-person assessment.&nbsp;   I am also including information on the "[employer]" Loop.&nbsp; This is a voluntary and free program that will send you daily emails inquiring about your symptoms.&nbsp; A group of providers will monitor your responses and if at any point in time they are concerned about your condition will call you to  evaluate if you need to be seen in person.&nbsp;&nbsp;   Feel better,  Odette Torres DNP      Based on the information you have provided, you do have symptoms that are consistent with Coronavirus (COVID-19).  The coronavirus causes mild to severe respiratory illness with the most common symptoms including fever, cough and difficulty breathing. Unfortunately, many viruses cause similar symptoms and it can be difficult to distinguish between viruses, especially in mild cases, so we are presuming that anyone with cough or fever has coronavirus at this time.  Coronavirus/COVID-19 has reached the point of community spread in [place], meaning that we are finding the virus in people with no known exposure risk for neil the virus. Given the increasing commonness of coronavirus in the community we are no longer testing patients who are not critically ill.  If you are a health care worker, you should refer to your employee health office for instructions about testing and returning to work.  For everyone else who has cough or fever, you should assume you are infected with coronavirus. Since you will not be tested but have symptoms that may be consistent with coronavirus, the CDC recommends you stay in self-isolation until these three things have happened:    You have had no fever for at least 72 hours (that is three full days of no fever without the use of medicine that reduces fevers)    AND   Other symptoms have improved (for example, when your cough or shortness of breath have improved)   AND   At least 7 days have passed since your symptoms first appeared.   How to Isolate:   Isolate yourself at home.  Do Not allow any visitors  Do Not go to work or school  Do Not go to [employer],  centers, shopping, or other public places.  Do Not shake hands.  Avoid close contact with others (hugging, kissing).   Protect Others:   Cover Your Mouth and Nose with a mask, disposable tissue or wash cloth to avoid spreading  germs to others.  Wash your hands and face frequently with soap and water.   We know it can be scary to hear that you might have COVID-19. Our team can help track your symptoms and make sure you are doing ok over the next two weeks using a program called [employer] to keep in touch. When you receive an email from [employer], please consider enrolling in our monitoring program. There is no cost to you for monitoring. Here is a URL where you can learn more: [email or web address]  Managing Symptoms:   At this time, we primarily recommend Tylenol (Acetaminophen) for fever or pain. If you have liver or kidney problems, contact your primary care provider for instructions on use of tylenol. Adults can take 650 mg (two 325 mg pills) by mouth every 4-6 hours as needed OR 1,000 mg (two 500 mg pills) every 8 hours as needed. MAXIMUM DAILY DOSE: 3,000mg. For children, refer to dosing on bottle based on age or weight.   If you develop significant shortness of breath that prevents you from doing normal activities, please call 911 or proceed to the nearest emergency room and alert them immediately that you have been in self-isolation for possible coronavirus.  If you have a higher risk medical condition such as cancer, heart failure, end stage renal disease on dialysis or have a transplant, please reach out to your specialist's clinic to advise them of your [facility] visit should you not improve within the next two days.   For more information about COVID19 and options for caring for yourself at home, please visit the CDC website at https://www.cdc.gov/coronavirus/2019-ncov/about/steps-when-sick.htmlFor more options for care at [facility], please visit our website at [email or web address]    Diagnosis: Other malaise  Diagnosis ICD: R53.81

## 2020-03-31 NOTE — TELEPHONE ENCOUNTER
Pt called in states she has fast heart beat.  Pt states she was sick for the last 10 days.  States she was sick for cough and difficulty  breathing.  The Pt states she feels better now.  No fever, no cough, has sometimes breathing difficulty.  Pt states her pulse 100/m.  No fast heart beat before.  No history heart disease.  has dizziness, no chest pain.  The disposition is home care.  Care advice given per protocol.  Patient agrees with care advice given.   Agreed to call back if he has additional symptoms or questions.      Horacio Sarah [place] Nurse Advisor 3/31/2020 5:52 PM      Additional Information    Negative: Passed out (i.e., lost consciousness, collapsed and was not responding)    Negative: Shock suspected (e.g., cold/pale/clammy skin, too weak to stand, low BP, rapid pulse)    Negative: Difficult to awaken or acting confused (e.g., disoriented, slurred speech)    Negative: Visible sweat on face or sweat dripping down face    Negative: Unable to walk, or can only walk with assistance (e.g., requires support)    Negative: [1] Received SHOCK from implantable cardiac defibrillator AND [2] persisting symptoms (i.e., palpitations, lightheadedness)    Negative: Sounds like a life-threatening emergency to the triager    Negative: Chest pain    Negative: Difficulty breathing    Negative: Dizziness, lightheadedness, or weakness    Negative: [1] Heart beating very rapidly (e.g., > 140 / minute) AND [2] present now  (Exception: during exercise)    Negative: Heart beating very slowly (e.g., < 50 / minute)  (Exception: athlete)    Negative: New or worsened shortness of breath with activity (dyspnea on exertion)    Negative: Patient sounds very sick or weak to the triager    Negative: [1] Heart beating very rapidly (e.g., > 140 / minute) AND [2] not present now  (Exception: during exercise)    Negative: [1] Skipped or extra beat(s) AND [2] increases with exercise or exertion    Negative: [1] Skipped or extra beat(s)  "AND [2] occurs 4 or more times per minute    Negative: New or worsened ankle swelling    Negative: History of heart disease  (i.e., heart attack, bypass surgery, angina, angioplasty, CHF) (Exception: brief heart beat symptoms that went away and now feels well)    Negative: Age > 60 years (Exception: brief heart beat symptoms that went away and now feels well)    Negative: Taking water pill (i.e., diuretic) or heart medication (e.g., digoxin)    Negative: Wearing a \"holter monitor\" or \"cardiac event monitor\"    Negative: [1] Received SHOCK from implantable cardiac defibrillator AND [2] now feels well    Negative: History of hyperthyroidism or taking thyroid medication    Negative: Known or suspected substance abuse (e.g., cocaine, alcohol abuse)    Negative: [1] Palpitations AND [2] no improvement after using CARE ADVICE    Negative: Problems with anxiety or stress    Palpitations    Negative: [1] Skipped or extra beat(s) AND [2] occurs < 4 times / minute    Negative: Palpitations are a chronic symptom (recurrent or ongoing AND present > 4 weeks)    Protocols used: HEART RATE AND HEARTBEAT [id number]      "

## 2020-04-17 ENCOUNTER — NURSE TRIAGE (OUTPATIENT)
Dept: NURSING | Facility: CLINIC | Age: 40
End: 2020-04-17

## 2020-04-18 ENCOUNTER — HOSPITAL ENCOUNTER (EMERGENCY)
Facility: CLINIC | Age: 40
Discharge: HOME | End: 2020-04-18
Attending: EMERGENCY MEDICINE | Admitting: EMERGENCY MEDICINE
Payer: COMMERCIAL

## 2020-04-18 ENCOUNTER — APPOINTMENT (OUTPATIENT)
Dept: GENERAL RADIOLOGY | Facility: CLINIC | Age: 40
End: 2020-04-18
Attending: EMERGENCY MEDICINE
Payer: COMMERCIAL

## 2020-04-18 VITALS
SYSTOLIC BLOOD PRESSURE: 148 MMHG | TEMPERATURE: 97.8 F | DIASTOLIC BLOOD PRESSURE: 98 MMHG | HEART RATE: 85 BPM | RESPIRATION RATE: 16 BRPM | OXYGEN SATURATION: 98 %

## 2020-04-18 DIAGNOSIS — R06.02 SOB (SHORTNESS OF BREATH): ICD-10-CM

## 2020-04-18 LAB
ALBUMIN SERPL-MCNC: 3.9 G/DL (ref 3.4–5)
ALP SERPL-CCNC: 70 U/L (ref 40–150)
ALT SERPL W P-5'-P-CCNC: 20 U/L (ref 0–50)
ANION GAP SERPL CALCULATED.3IONS-SCNC: 5 MMOL/L (ref 3–14)
AST SERPL W P-5'-P-CCNC: 15 U/L (ref 0–45)
BASOPHILS # BLD AUTO: 0.1 10E9/L (ref 0–0.2)
BASOPHILS NFR BLD AUTO: 1.2 %
BILIRUB SERPL-MCNC: 0.4 MG/DL (ref 0.2–1.3)
BUN SERPL-MCNC: 12 MG/DL (ref 7–30)
CALCIUM SERPL-MCNC: 8.6 MG/DL (ref 8.5–10.1)
CHLORIDE SERPL-SCNC: 108 MMOL/L (ref 94–109)
CO2 SERPL-SCNC: 26 MMOL/L (ref 20–32)
CREAT SERPL-MCNC: 0.97 MG/DL (ref 0.52–1.04)
D DIMER PPP FEU-MCNC: <0.3 UG/ML FEU (ref 0–0.5)
DIFFERENTIAL METHOD BLD: NORMAL
EOSINOPHIL # BLD AUTO: 0.2 10E9/L (ref 0–0.7)
EOSINOPHIL NFR BLD AUTO: 2.5 %
ERYTHROCYTE [DISTWIDTH] IN BLOOD BY AUTOMATED COUNT: 11.7 % (ref 10–15)
GFR SERPL CREATININE-BSD FRML MDRD: 73 ML/MIN/{1.73_M2}
GLUCOSE SERPL-MCNC: 111 MG/DL (ref 70–99)
HCT VFR BLD AUTO: 42 % (ref 35–47)
HGB BLD-MCNC: 14 G/DL (ref 11.7–15.7)
IMM GRANULOCYTES # BLD: 0 10E9/L (ref 0–0.4)
IMM GRANULOCYTES NFR BLD: 0.5 %
LYMPHOCYTES # BLD AUTO: 2.8 10E9/L (ref 0.8–5.3)
LYMPHOCYTES NFR BLD AUTO: 34.6 %
MCH RBC QN AUTO: 30.2 PG (ref 26.5–33)
MCHC RBC AUTO-ENTMCNC: 33.3 G/DL (ref 31.5–36.5)
MCV RBC AUTO: 91 FL (ref 78–100)
MONOCYTES # BLD AUTO: 0.6 10E9/L (ref 0–1.3)
MONOCYTES NFR BLD AUTO: 7.4 %
NEUTROPHILS # BLD AUTO: 4.4 10E9/L (ref 1.6–8.3)
NEUTROPHILS NFR BLD AUTO: 53.8 %
NRBC # BLD AUTO: 0 10*3/UL
NRBC BLD AUTO-RTO: 0 /100
PLATELET # BLD AUTO: 320 10E9/L (ref 150–450)
POTASSIUM SERPL-SCNC: 3.2 MMOL/L (ref 3.4–5.3)
PROT SERPL-MCNC: 7.4 G/DL (ref 6.8–8.8)
RBC # BLD AUTO: 4.64 10E12/L (ref 3.8–5.2)
SODIUM SERPL-SCNC: 139 MMOL/L (ref 133–144)
TROPONIN I BLD-MCNC: 0 UG/L (ref 0–0.08)
TROPONIN I SERPL-MCNC: <0.015 UG/L (ref 0–0.04)
WBC # BLD AUTO: 8.2 10E9/L (ref 4–11)

## 2020-04-18 PROCEDURE — 85025 COMPLETE CBC W/AUTO DIFF WBC: CPT | Performed by: EMERGENCY MEDICINE

## 2020-04-18 PROCEDURE — 84484 ASSAY OF TROPONIN QUANT: CPT

## 2020-04-18 PROCEDURE — 99285 EMERGENCY DEPT VISIT HI MDM: CPT | Mod: 25

## 2020-04-18 PROCEDURE — 85379 FIBRIN DEGRADATION QUANT: CPT | Performed by: EMERGENCY MEDICINE

## 2020-04-18 PROCEDURE — 93005 ELECTROCARDIOGRAM TRACING: CPT

## 2020-04-18 PROCEDURE — 25000132 ZZH RX MED GY IP 250 OP 250 PS 637: Performed by: EMERGENCY MEDICINE

## 2020-04-18 PROCEDURE — 80053 COMPREHEN METABOLIC PANEL: CPT | Performed by: EMERGENCY MEDICINE

## 2020-04-18 PROCEDURE — 71046 X-RAY EXAM CHEST 2 VIEWS: CPT

## 2020-04-18 PROCEDURE — 84484 ASSAY OF TROPONIN QUANT: CPT | Mod: 91 | Performed by: EMERGENCY MEDICINE

## 2020-04-18 RX ORDER — ASPIRIN 81 MG/1
324 TABLET, CHEWABLE ORAL ONCE
Status: DISCONTINUED | OUTPATIENT
Start: 2020-04-18 | End: 2020-04-18

## 2020-04-18 RX ORDER — ALBUTEROL SULFATE 90 UG/1
2 AEROSOL, METERED RESPIRATORY (INHALATION) EVERY 6 HOURS PRN
Qty: 1 INHALER | Refills: 0 | Status: SHIPPED | OUTPATIENT
Start: 2020-04-18 | End: 2020-06-02

## 2020-04-18 RX ORDER — LORAZEPAM 1 MG/1
1 TABLET ORAL ONCE
Status: COMPLETED | OUTPATIENT
Start: 2020-04-18 | End: 2020-04-18

## 2020-04-18 RX ORDER — POTASSIUM CHLORIDE 1.5 G/1.58G
40 POWDER, FOR SOLUTION ORAL ONCE
Status: COMPLETED | OUTPATIENT
Start: 2020-04-18 | End: 2020-04-18

## 2020-04-18 RX ADMIN — POTASSIUM CHLORIDE 40 MEQ: 1.5 POWDER, FOR SOLUTION ORAL at 01:56

## 2020-04-18 RX ADMIN — LORAZEPAM 1 MG: 1 TABLET ORAL at 00:55

## 2020-04-18 ASSESSMENT — ENCOUNTER SYMPTOMS
COUGH: 0
SHORTNESS OF BREATH: 1
FEVER: 0

## 2020-04-18 NOTE — ED PROVIDER NOTES
History     Chief Complaint:  Shortness of Breath    HPI   Delicia Henry is a 39 year old female with a history of asthma and congenital fusion of kidneys who presents with shortness of breath. She has been experiencing intermittent shortness of breath for the past 2 days. The most recent episode started 3 hours prior to evaluation. She endorses trouble catching her breath when she walks and tightness in her back. She denies any chest pain, fever, or cough. She did note she has increased stressors due to her work situation.    Allergies:  Avelox  Erythromycin  Sulfa drugs     Medications:    Calcium carbonate  Depo-provera     Past Medical History:    Asthma  Molar pregnancy  Congenital fusion of kidneys  Cleft palate    Past Surgical History:    Dilation and curettage suction  ENT surgery--ear    Family History:    Allergies - father, sister  Thyroid disease - sister, mother    Social History:  Smoking status: never  Smokeless tobacco: never  Alcohol use: no  Drug use: no  Marital Status:   [2]     Review of Systems   Constitutional: Negative for fever.   Respiratory: Positive for shortness of breath. Negative for cough.    Cardiovascular: Negative for chest pain.   All other systems reviewed and are negative.      Physical Exam     Patient Vitals for the past 24 hrs:   BP Temp Temp src Pulse Heart Rate Resp SpO2   04/18/20 0100 (!) 137/100 -- -- 88 -- -- 99 %   04/18/20 0043 (!) 158/98 -- -- 103 -- -- 100 %   04/18/20 0034 -- 97.8  F (36.6  C) Oral -- 94 20 99 %     Physical Exam  Constitutional:  Oriented to person, place, and time.   Head:    Normocephalic.   Mouth/Throat:   Oropharynx is clear and moist.   Eyes:    EOM are normal. Pupils are equal, round, and reactive to light.   Neck:    Neck supple.   Cardiovascular:  Normal rate, regular rhythm and normal heart sounds.      Exam reveals no gallop and no friction rub.       No murmur heard.  Pulmonary/Chest:  Effort normal and breath  sounds normal.      No respiratory distress. No wheezes. No rales.      No reproducible chest wall pain.  Abdominal:   Soft. No distension. No tenderness. No rebound and no guarding.   Musculoskeletal:  Normal range of motion. 2+ distal equal pulses. No leg calf tenderness, swelling or edema.  Neurological:   Alert and oriented to person, place, and time.           Moves all 4 extremities spontaneously    Skin:    No rash noted. No pallor.     Emergency Department Course   ECG (0:51:49):  Rate 102 bpm. VT interval 148. QRS duration 70. QT/QTc 378/492. P-R-T axes 56 17 46. Sinus tachycardia. Otherwise normal ECG. Interpreted at 0056 by Chaz Bailon MD.    Imaging:  Radiology findings were communicated with the patient who voiced understanding of the findings.    Chest XR, PA & LAT  IMPRESSION: No focal air-space disease or other acute findings. Normal heart size. Breast implants.  As read by Radiology.    Laboratory:  Laboratory findings were communicated with the patient who voiced understanding of the findings.    D dimer: <0.3    Troponin 0059: 0.00    CMP: Potassium 3.2(L), Glucose 111(H), o/w WNL (Creatinine 0.97)    CBC: WNL (WBC 8.2, HGB 14, )    Interventions:  0055 Ativan 1mg PO  0156 potassium chloride 40mEq PO    Emergency Department Course:  Past medical records, nursing notes, and vitals reviewed.  0043: I performed an exam of the patient and obtained history, as documented above.     EKG was obtained and reviewed in the emergency department.    IV inserted and blood drawn.    The patient was sent for a chest XR while in the emergency department, results above.     0205: I rechecked the patient. I reviewed the results with the Patient and answered all related questions prior to discharge.     Findings and plan explained to the Patient. Patient discharged home with instructions regarding supportive care, medications, and reasons to return. The importance of close follow-up was reviewed.    Impression & Plan   Medical Decision Making:  Patient came in complaining of shortness of breath which has been intermittent ongoing for the past 2 days. Differential includes PE, ACS equivalent arrhythmia, syncope, pneumonia pneumothorax, other causes. Workup thus far is otherwise reassuring. She had a negative D dimer therefore she is otherwise low risk for PE therefore I do not believe she requires CT imaging. At this time I do believe she is appropriate for further outpatient management. She is discharged home with an albuterol inhaler and is told to return for any worsening shortness of breath, chest pain, or any symptoms of concern. Disposed home, follow up with PMD.    Diagnosis:    ICD-10-CM   1. SOB (shortness of breath)  R06.02     Disposition:  Discharged to home.    Discharge Medications:  Albuterol    Anita Kauffman  4/18/2020   [facility] EMERGENCY DEPARTMENT    Scribe Disclosure:  Anita GARCIA, am serving as a scribe at 12:43 AM on 4/18/2020 to document services personally performed by Chaz Bailon MD based on my observations and the provider's statements to me.          Chaz Bailon MD  04/18/20 0621

## 2020-04-18 NOTE — TELEPHONE ENCOUNTER
Has been having difficulty breathing - no fever, no cough. 3 weeks ago thought she needed to go to ED - slowly got better over past 3 weeks. This past week feeling good - then all of a sudden has a bad day. Feels like chest patricia hurts, back hurts.    Per protocol - advised ED evaluation. Patient will consider [facility].    COVID 19 Nurse Triage Plan/Patient Instructions    Please be aware that novel coronavirus (COVID-19) may be circulating in the community. If you develop symptoms such as fever, cough, or SOB or if you have concerns about the presence of another infection including coronavirus (COVID-19), please contact your health care provider or visit [email or web address].     Disposition/Instructions    Patient to go to ED and follow protocol based instructions. Follow System Ambulatory Workflow for COVID 19.     Bring Your Own Device:  Please also bring your smart device(s) (smart phones, tablets, laptops) and their charging cables for your personal use and to communicate with your care team during your visit.    Thank you for limiting contact with others, wearing a simple mask to cover your cough, practice good hand hygiene habits and accessing our virtual services where possible to limit the spread of this virus.    For more information about COVID19 and options for caring for yourself at home, please visit the CDC website at https://www.cdc.gov/coronavirus/2019-ncov/about/steps-when-sick.html  For more options for care at [facility], please visit our website at [email or web address]    For more information, please use the [place] Department of Health ([facility]) COVID-19 Hotlines (Interpreters available):     Health questions: Phone Number: [phone] or [phone] and Hours: 7 a.m. to 7 p.m.    Schools and  questions: Phone Number: [phone] or [phone] and Hours 7 a.m. to 7 p.m.    Chanelle Prather RN on 4/17/2020 at 11:24 PM    Reason for Disposition     "[1] MILD difficulty breathing (e.g., minimal/no SOB at rest, SOB with walking, pulse <100) AND [2] NEW-onset or WORSE than normal    Additional Information    Negative: [1] Breathing stopped AND [2] hasn't returned    Negative: Choking on something    Negative: Severe difficulty breathing (e.g., struggling for each breath, speaks in single words)    Negative: Bluish (or gray) lips or face now    Negative: Difficult to awaken or acting confused (e.g., disoriented, slurred speech)    Negative: Passed out (i.e., lost consciousness, collapsed and was not responding)    Negative: Wheezing started suddenly after medicine, an allergic food or bee sting    Negative: Stridor    Negative: Slow, shallow and weak breathing    Negative: Sounds like a life-threatening emergency to the triager    Negative: Chest pain    Negative: [1] Wheezing (high pitched whistling sound) AND [2] previous asthma attacks or use of asthma medicines    Negative: [1] Difficulty breathing AND [2] only present when coughing    Negative: [1] Difficulty breathing AND [2] only from stuffy or runny nose    Negative: [1] MODERATE difficulty breathing (e.g., speaks in phrases, SOB even at rest, pulse 100-120) AND [2] NEW-onset or WORSE than normal    Negative: Wheezing can be heard across the room    Negative: Drooling or spitting out saliva (because can't swallow)    Negative: History of prior \"blood clot\" in leg or lungs (i.e., deep vein thrombosis, pulmonary embolism)    Negative: History of inherited increased risk of blood clots (e.g., Factor 5 Leiden, Anti-thrombin 3, Protein C or Protein S deficiency, Prothrombin mutation)    Negative: Recent illness requiring prolonged bedrest (i.e., immobilization)    Negative: Hip or leg fracture in past 2 months (e.g., had cast on leg or ankle)    Negative: Major surgery in the past month    Negative: Recent long-distance travel with prolonged time in car, bus, plane, or train (i.e., within past 2 weeks; 6 or  " "more hours duration)    Negative: Extra heart beats OR irregular heart beating   (i.e., \"palpitations\")    Negative: Fever > 103 F (39.4 C)    Negative: [1] Fever > 101 F (38.3 C) AND [2] age > 60    Negative: [1] Fever > 100.0 F (37.8 C) AND [2] bedridden (e.g., nursing home patient, CVA, chronic illness, recovering from surgery)    Negative: [1] Fever > 100.0 F (37.8 C) AND [2] diabetes mellitus or weak immune system (e.g., HIV positive, cancer chemo, splenectomy, chronic steroids)    Negative: [1] Periods where breathing stops and then resumes normally AND [2] bedridden (e.g., nursing home patient, CVA)    Negative: Pregnant or postpartum (< 1 month since delivery)    Negative: Patient sounds very sick or weak to the triager    Answer Assessment - Initial Assessment Questions  1. RESPIRATORY STATUS: \"Describe your breathing?\" (e.g., wheezing, shortness of breath, unable to speak, severe coughing)       Feels almost lightheaded - tingly in hands / legs - feels out of breath  2. ONSET: \"When did this breathing problem begin?\"       3-4 weeks  3. PATTERN \"Does the difficult breathing come and go, or has it been constant since it started?\"       Has never gone away - but has episodes where it's worse  4. SEVERITY: \"How bad is your breathing?\" (e.g., mild, moderate, severe)     - MILD: No SOB at rest, mild SOB with walking, speaks normally in sentences, can lay down, no retractions, pulse < 100.     - MODERATE: SOB at rest, SOB with minimal exertion and prefers to sit, cannot lie down flat, speaks in phrases, mild retractions, audible wheezing, pulse 100-120.     - SEVERE: Very SOB at rest, speaks in single words, struggling to breathe, sitting hunched forward, retractions, pulse > 120       moderate  5. RECURRENT SYMPTOM: \"Have you had difficulty breathing before?\" If so, ask: \"When was the last time?\" and \"What happened that time?\"       Similar to bronchitis in past which resolved with antibiotics  6. CARDIAC " "HISTORY: \"Do you have any history of heart disease?\" (e.g., heart attack, angina, bypass surgery, angioplasty)       no  7. LUNG HISTORY: \"Do you have any history of lung disease?\"  (e.g., pulmonary embolus, asthma, emphysema)      no  8. CAUSE: \"What do you think is causing the breathing problem?\"       ?? pneumonia  9. OTHER SYMPTOMS: \"Do you have any other symptoms? (e.g., dizziness, runny nose, cough, chest pain, fever)      Dizzy, chest pain comes and goes  10. PREGNANCY: \"Is there any chance you are pregnant?\" \"When was your last menstrual period?\"        No  11. TRAVEL: \"Have you traveled out of the country in the last month?\" (e.g., travel history, exposures)        No    Protocols used: BREATHING DIFFICULTY-A-AH      "

## 2020-04-18 NOTE — ED AVS SNAPSHOT
[facility] Emergency Department  [address]  [facility] [address]  Phone:  [phone]  Fax:  [phone]                                    Delicia Henry   MRN: [id number]    Department:  [facility] Emergency Department   Date of Visit:  4/18/2020           After Visit Summary Signature Page    I have received my discharge instructions, and my questions have been answered. I have discussed any challenges I see with this plan with the nurse or doctor.    ..........................................................................................................................................  Patient/Patient Representative Signature      ..........................................................................................................................................  Patient Representative Print Name and Relationship to Patient    ..................................................               ................................................  Date                                   Time    ..........................................................................................................................................  Reviewed by Signature/Title    ...................................................              ..............................................  Date                                               Time          22EPIC Rev 08/18

## 2020-04-19 LAB — INTERPRETATION ECG - MUSE: NORMAL

## 2020-04-21 ENCOUNTER — VIRTUAL VISIT (OUTPATIENT)
Dept: INTERNAL MEDICINE | Facility: CLINIC | Age: 40
End: 2020-04-21
Payer: COMMERCIAL

## 2020-04-21 DIAGNOSIS — R07.89 CHEST TIGHTNESS: Primary | ICD-10-CM

## 2020-04-21 PROCEDURE — 99213 OFFICE O/P EST LOW 20 MIN: CPT | Mod: 95 | Performed by: INTERNAL MEDICINE

## 2020-04-21 RX ORDER — METHOCARBAMOL 500 MG/1
500-1000 TABLET, FILM COATED ORAL 3 TIMES DAILY PRN
Qty: 90 TABLET | Refills: 0 | Status: SHIPPED | OUTPATIENT
Start: 2020-04-21 | End: 2020-06-02

## 2020-04-21 NOTE — PROGRESS NOTES
"Delicia Henry is a 39 year old female who is being evaluated via a billable telephone visit.      The patient has been notified of following:     \"This telephone visit will be conducted via a call between you and your physician/provider. We have found that certain health care needs can be provided without the need for a physical exam.  This service lets us provide the care you need with a short phone conversation.  If a prescription is necessary we can send it directly to your pharmacy.  If lab work is needed we can place an order for that and you can then stop by our lab to have the test done at a later time.    Telephone visits are billed at different rates depending on your insurance coverage. During this emergency period, for some insurers they may be billed the same as an in-person visit.  Please reach out to your insurance provider with any questions.    If during the course of the call the physician/provider feels a telephone visit is not appropriate, you will not be charged for this service.\"    Patient has given verbal consent for Telephone visit?  Yes    How would you like to obtain your AVS? Mail a copy    Subjective     Delicia Henry is a 39 year old female who presents to clinic today for the following health issues:    hospitals  ED/UC Followup:    Facility:  [facility] ED   Date of visit: 4/17/20   Reason for visit: SOB   Current Status: stable     She reports she has been having uncomfortable breathing and tightness in her chest for about a month.  She reports her breathing will seem to be okay in the morning but as the day goes on she feels more discomfort like it is hard to get a good breath, some mild dyspnea on exertion, tightness in her chest.  She is also been having tightness across her upper back.  She did 3 virtual visits and was advised she may have COVID-19 though she is never had fever, chills, cough.  Her symptoms were very bad on 4/3/2020 but she did not seek any evaluation at " that time.  They gradually improved but then 3 days ago it was very bad again.  Her upper back hurt so much that she felt like she was going to pass out so went to the ED.  She had evaluation including a chest x-ray, EKG, labs including 2 troponins, d-dimer and a CBC.  Her tests were all normal and her oxygen levels were 100%.  While there, they gave her 1 mg of Ativan IV.  She reports she felt better at the time she was discharged and felt very good the next day.  Then 2 days ago, later in the day she started to have more soreness in her upper back and tightness in the chest again and has had an increase in the dyspnea and discomfort of her breathing since then.  She was given an inhaler which does not seem to be helping her breathing at all.  She reports she is under extreme stress over the pandemic issues.  She has been having some episodes of fast heartbeat, 1 time up to 140, frequently up to the 120 range.  It does seem to fade away gradually.  There is been no irregular heartbeats.    She feels she is doing a lot of exercise and stretching.        Patient Active Problem List   Diagnosis     Cleft palate     Congenital fusion of kidneys     Current Outpatient Medications   Medication Sig Dispense Refill     albuterol (PROAIR HFA/PROVENTIL HFA/VENTOLIN HFA) 108 (90 Base) MCG/ACT inhaler Inhale 2 puffs into the lungs every 6 hours as needed for shortness of breath / dyspnea or wheezing 1 Inhaler 0      Social History     Tobacco Use     Smoking status: Never Smoker     Smokeless tobacco: Never Used   Substance Use Topics     Alcohol use: No     Drug use: No        Reviewed and updated as needed this visit by Provider         Review of Systems   No fever, chills, sore throat, cough.  There is no exertional chest pain, it is more of a tightness and discomfort.       Objective       Assessment/Plan:  1. Chest tightness  Advised patient that it is still possible that she has COVID-19 even without cough or fever.   Advised that those symptoms have often lasted 4 to 6 weeks.  It is also very possible that she has some muscular component, particularly since she feels episodic upper back pain.  Also may have a significant anxiety component associated with this.  Reassured that there is no evidence that there is any significant cardiopulmonary problem causing her symptoms.  Advised about specific stretching for her chest and upper back muscles and will give short-term trial of muscle relaxer.  Discussed distraction techniques and some phone apps for dealing with stress and anxiety.  Advised to pay attention to ergonomics.  Advised about symptoms at night require recurrent evaluation including severe dyspnea.  - methocarbamol (ROBAXIN) 500 MG tablet; Take 1-2 tablets (500-1,000 mg) by mouth 3 times daily as needed for muscle spasms  Dispense: 90 tablet; Refill: 0    No follow-ups on file.      Phone call duration:  28 minutes    Analisa Gallagher MD

## 2020-05-21 ENCOUNTER — ALLIED HEALTH/NURSE VISIT (OUTPATIENT)
Dept: NURSING | Facility: CLINIC | Age: 40
End: 2020-05-21
Payer: COMMERCIAL

## 2020-05-21 DIAGNOSIS — Z30.9 ENCOUNTER FOR CONTRACEPTIVE MANAGEMENT, UNSPECIFIED TYPE: Primary | ICD-10-CM

## 2020-05-21 PROCEDURE — 96372 THER/PROPH/DIAG INJ SC/IM: CPT

## 2020-05-21 RX ADMIN — MEDROXYPROGESTERONE ACETATE 150 MG: 150 INJECTION, SUSPENSION INTRAMUSCULAR at 14:50

## 2020-06-02 ENCOUNTER — VIRTUAL VISIT (OUTPATIENT)
Dept: FAMILY MEDICINE | Facility: CLINIC | Age: 40
End: 2020-06-02
Payer: COMMERCIAL

## 2020-06-02 DIAGNOSIS — Z30.09 ENCOUNTER FOR OTHER GENERAL COUNSELING OR ADVICE ON CONTRACEPTION: ICD-10-CM

## 2020-06-02 DIAGNOSIS — R06.00 DYSPNEA, UNSPECIFIED TYPE: Primary | ICD-10-CM

## 2020-06-02 DIAGNOSIS — R07.89 CHEST TIGHTNESS: ICD-10-CM

## 2020-06-02 PROCEDURE — 99214 OFFICE O/P EST MOD 30 MIN: CPT | Mod: 95 | Performed by: NURSE PRACTITIONER

## 2020-06-02 RX ORDER — METHOCARBAMOL 500 MG/1
500-1000 TABLET, FILM COATED ORAL 3 TIMES DAILY PRN
Qty: 90 TABLET | Refills: 0 | Status: SHIPPED | OUTPATIENT
Start: 2020-06-02 | End: 2020-08-17

## 2020-06-02 RX ORDER — ALBUTEROL SULFATE 90 UG/1
2 AEROSOL, METERED RESPIRATORY (INHALATION) EVERY 6 HOURS PRN
Qty: 1 INHALER | Refills: 0 | Status: SHIPPED | OUTPATIENT
Start: 2020-06-02 | End: 2020-06-24

## 2020-06-02 NOTE — PROGRESS NOTES
"Delicia Henry is a 39 year old female who is being evaluated via a billable telephone visit.      The patient has been notified of following:     \"This telephone visit will be conducted via a call between you and your physician/provider. We have found that certain health care needs can be provided without the need for a physical exam.  This service lets us provide the care you need with a short phone conversation.  If a prescription is necessary we can send it directly to your pharmacy.  If lab work is needed we can place an order for that and you can then stop by our lab to have the test done at a later time.    Telephone visits are billed at different rates depending on your insurance coverage. During this emergency period, for some insurers they may be billed the same as an in-person visit.  Please reach out to your insurance provider with any questions.    If during the course of the call the physician/provider feels a telephone visit is not appropriate, you will not be charged for this service.\"    Patient has given verbal consent for Telephone visit?  Yes    What phone number would you like to be contacted at? [phone]    How would you like to obtain your AVS? Shelbie Ann     Delicia Henry is a 39 year old female who presents via phone visit today for the following health issues:    HPI  Concern - Discuss Nexplanon and Vitamin D  Started Vitamin D supplement.      Patient would like a renewal prescription for her depo shot. She had been on depo between her 17 year old child and 6 year old. She then trialed the birth control pill which she reports caused significant hair loss. She likes that it has stopped her from getting a period. She declines a FMH of osteoporosis and no history of fractures or depression.     Is now seeing more weight gain with depo-provera even with consistent exercise.       When she is at the end of 3rd month, has an increase in irritability, hair loss " and then her symptoms will normalize with getting another depo-provera injection.        ED/UC Followup:    Facility:    Date of visit: 4/18/2020  Reason for visit: SOB   Current Status: Still having SOB        Presumed COVID-19 due to symptoms.  Most of April was sick with symptoms.    Was seen in ED on 4/18/2020 and was placed on a muscle relaxant and Albuterol.      Was fine after this visit with IM on 4/21/2020 and now over the past 3 days has had shortness of breath return.    Primary symptoms is shortness of breath with activity, no other fever/chills, headache, body aches or loss of taste/smell.      Took a muscle relaxant and this has helped slightly.  Did try Albuterol inhaler today and this helped slightly.  Doesn't feel like her lungs are opening up.      History of asthma as a child.      History of intermittent anxiety with palpitations and shortness of breath. This doesn't feel like this.       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        Patient Active Problem List   Diagnosis     Cleft palate     Congenital fusion of kidneys     Past Surgical History:   Procedure Laterality Date     DILATION AND CURETTAGE SUCTION  2/13/2012    Procedure:DILATION AND CURETTAGE SUCTION; DILATION AND CURETTAGE SUCTION ; Surgeon:LEIGHTON GALLAGHER; Location:[facility] OR     ENT SURGERY      tumor rem under ear, benign       Social History     Tobacco Use     Smoking status: Never Smoker     Smokeless tobacco: Never Used   Substance Use Topics     Alcohol use: No     Family History   Problem Relation Age of Onset     Allergies Father         seasonal     Allergies Sister         seasonal     Thyroid Disease Sister      Thyroid Disease Mother      Diabetes Paternal Grandfather          Current Outpatient Medications   Medication Sig Dispense Refill     albuterol (PROAIR HFA/PROVENTIL HFA/VENTOLIN HFA) 108 (90 Base) MCG/ACT inhaler Inhale 2 puffs into the lungs every 6 hours as  needed for shortness of breath / dyspnea or wheezing 1 Inhaler 0     methocarbamol (ROBAXIN) 500 MG tablet Take 1-2 tablets (500-1,000 mg) by mouth 3 times daily as needed for muscle spasms 90 tablet 0     Allergies   Allergen Reactions     Avelox Swelling     Swelling tongue, dizzy, itching     Erythromycin Nausea     Sulfa Drugs      Unknown reaction       Reviewed and updated as needed this visit by Provider         Review of Systems   Constitutional, HEENT, cardiovascular, pulmonary, gi and gu systems are negative, except as otherwise noted.       Objective   Reported vitals:  There were no vitals taken for this visit.     General:  healthy, alert and no distress  PSYCH: Alert and oriented times 3; coherent speech, normal   rate and volume, able to articulate logical thoughts, able   to abstract reason, no tangential thoughts, no hallucinations   or delusions  Her affect is normal  RESP: No cough, no audible wheezing, able to talk in full sentences  Remainder of exam unable to be completed due to telephone visits          Assessment/Plan:    Delicia was seen today for contraception, recheck medication and er f/u.    Diagnoses and all orders for this visit:    Dyspnea, unspecified type  Chest tightness  -     PULMONARY MEDICINE REFERRAL - discussed proceeding with consultation and consideration for PFT's.    -     albuterol (PROAIR HFA/PROVENTIL HFA/VENTOLIN HFA) 108 (90 Base) MCG/ACT inhaler; Inhale 2 puffs into the lungs every 6 hours as needed for shortness of breath / dyspnea or wheezing  -     methocarbamol (ROBAXIN) 500 MG tablet; Take 1-2 tablets (500-1,000 mg) by mouth 3 times daily as needed for muscle spasms    Encounter for other general counseling or advice on contraception  Discusse contraception options, patient does not want an IUD or the pill. Would like something that will keep her from getting a period like the depo and is most similar to depo-provera. Hesitant to continue depo with this patient  because of her 11.5 years on depo and the current best practice of not utilizing depo more than 10 years because of the loss of bone density and weight gain association. Discussed placing nexplanon 2 weeks before her next depo in August (schedule Nexplanon in early August).      History of COVID-19 symptoms  -     COVID-19 Virus (Coronavirus) Antibody; Future         Return in about 2 months (around 8/2/2020) for Nexplanon placement, sooner as needed.      Phone call duration:   24 minutes   1:06 p.m. to 1:30 p.m.       Crissy Rutherford, MINH CNP

## 2020-06-04 NOTE — TELEPHONE ENCOUNTER
RECORDS RECEIVED FROM: Internal   DATE RECEIVED: 7.6.2020   NOTES STATUS DETAILS   OFFICE NOTE from referring provider Internal 6.2.2020 [facility], [facility]   OFFICE NOTE from other specialist N/A    DISCHARGE SUMMARY from hospital N/A    DISCHARGE REPORT from the ER Internal 4.18.2020 [facility]   MEDICATION LIST Internal    IMAGING  (NEED IMAGES AND REPORTS)     CT SCAN N/A    CHEST XRAY (CXR) Internal 4.18.20   TESTS     PULMONARY FUNCTION TESTING (PFT) N/A

## 2020-06-24 DIAGNOSIS — R06.00 DYSPNEA, UNSPECIFIED TYPE: ICD-10-CM

## 2020-06-24 RX ORDER — ALBUTEROL SULFATE 90 UG/1
AEROSOL, METERED RESPIRATORY (INHALATION)
Qty: 6.7 G | Refills: 0 | Status: SHIPPED | OUTPATIENT
Start: 2020-06-24 | End: 2020-11-19

## 2020-07-06 ENCOUNTER — PRE VISIT (OUTPATIENT)
Dept: PULMONOLOGY | Facility: CLINIC | Age: 40
End: 2020-07-06

## 2020-07-19 ENCOUNTER — ANCILLARY PROCEDURE (OUTPATIENT)
Dept: GENERAL RADIOLOGY | Facility: CLINIC | Age: 40
End: 2020-07-19
Attending: FAMILY MEDICINE
Payer: COMMERCIAL

## 2020-07-19 ENCOUNTER — OFFICE VISIT (OUTPATIENT)
Dept: URGENT CARE | Facility: URGENT CARE | Age: 40
End: 2020-07-19
Payer: COMMERCIAL

## 2020-07-19 ENCOUNTER — VIRTUAL VISIT (OUTPATIENT)
Dept: FAMILY MEDICINE | Facility: OTHER | Age: 40
End: 2020-07-19

## 2020-07-19 VITALS
TEMPERATURE: 98.8 F | OXYGEN SATURATION: 100 % | SYSTOLIC BLOOD PRESSURE: 118 MMHG | DIASTOLIC BLOOD PRESSURE: 80 MMHG | HEART RATE: 88 BPM

## 2020-07-19 DIAGNOSIS — R06.00 DYSPNEA, UNSPECIFIED TYPE: ICD-10-CM

## 2020-07-19 DIAGNOSIS — R06.00 DYSPNEA, UNSPECIFIED TYPE: Primary | ICD-10-CM

## 2020-07-19 PROCEDURE — U0003 INFECTIOUS AGENT DETECTION BY NUCLEIC ACID (DNA OR RNA); SEVERE ACUTE RESPIRATORY SYNDROME CORONAVIRUS 2 (SARS-COV-2) (CORONAVIRUS DISEASE [COVID-19]), AMPLIFIED PROBE TECHNIQUE, MAKING USE OF HIGH THROUGHPUT TECHNOLOGIES AS DESCRIBED BY CMS-2020-01-R: HCPCS | Performed by: FAMILY MEDICINE

## 2020-07-19 PROCEDURE — 71046 X-RAY EXAM CHEST 2 VIEWS: CPT

## 2020-07-19 PROCEDURE — 99214 OFFICE O/P EST MOD 30 MIN: CPT | Performed by: FAMILY MEDICINE

## 2020-07-19 PROCEDURE — 99000 SPECIMEN HANDLING OFFICE-LAB: CPT | Performed by: FAMILY MEDICINE

## 2020-07-19 PROCEDURE — 86769 SARS-COV-2 COVID-19 ANTIBODY: CPT | Mod: 90 | Performed by: FAMILY MEDICINE

## 2020-07-19 PROCEDURE — 36415 COLL VENOUS BLD VENIPUNCTURE: CPT | Performed by: FAMILY MEDICINE

## 2020-07-19 NOTE — PROGRESS NOTES
"Date: 2020 10:04:20  Clinician: Mayur Dwyer  Clinician NPI: [id number]  Patient: Delicia Henry  Patient : 1980  Patient Address: [address] [place] [address], [place] [address]  Patient Phone: [phone]  Visit Protocol: URI  Patient Summary:  Delicia is a 39 year old ( : 1980 ) female who initiated a Visit for COVID-19 (Coronavirus) evaluation and screening. When asked the question \"Please sign me up to receive news, health information and promotions from [employer].\", Delicia responded \"No\".    Delicia states her symptoms started gradually 10-13 days ago.   Her symptoms consist of wheezing. She is experiencing mild difficulty breathing with activities but can speak normally in full sentences.   Symptom details   Wheezing: Delicia has not ever been diagnosed with asthma. Additional wheezing details as reported by the patient (free text): Sometimes and the very end of a breath out.      Delicia denies having nausea, teeth pain, ageusia, diarrhea, vomiting, rhinitis, malaise, ear pain, headache, chills, sore throat, enlarged lymph nodes, myalgias, anosmia, facial pain or pressure, fever, cough, and nasal congestion. She also denies having recent facial or sinus surgery in the past 60 days, taking antibiotic medication in the past month, and double sickening (worsening symptoms after initial improvement).    Pertinent COVID-19 (Coronavirus) information  In the past 14 days, Delicia has not worked in a congregate living setting.   She does not work or volunteer as healthcare worker or a  and does not work or volunteer in a healthcare facility.   Delicia also has not lived in a congregate living setting in the past 14 days. She does not live with a healthcare worker.   Delicia has not had a close contact with a laboratory-confirmed COVID-19 patient within 14 days of symptom onset.   Pertinent medical history  Delicia does not get yeast infections when she takes antibiotics.   " Delicia does not need a return to work/school note.   Weight: 145 lbs   Delicia does not smoke or use smokeless tobacco.   She denies pregnancy and denies breastfeeding. She has menstruated in the past month.   Additional information as reported by the patient (free text): Trouble breathing came on last Thursday, it feels like something in my chest.  I did have presumed COVID back in March and I had all the symptoms other than fever. Now this is totally different.  It's just my breathing seems worse every day that passes by but nothing else, I don't feel sick at all.  I use my inhaler and At the beginning it helped, now that helps for an hour or so and then it gets worse again.   Weight: 145 lbs    MEDICATIONS: No current medications, ALLERGIES: erythromycin base  Clinician Response:  Dear Delicia,  I am sorry you are not feeling well. To determine the most appropriate care for you, I would like you to be seen in person to further discuss your health history and symptoms.  You will not be charged for this Visit. Thank you for trusting us with your care.  COVID-19 (Coronavirus) General Information  Because there is currently no vaccine to prevent infection, the best way to protect yourself is to avoid being exposed to this virus. Common symptoms of COVID-19 include but are not limited to fever, cough, and shortness of breath. These symptoms appear 2-14 days after you are exposed to the virus that causes COVID-19. Click here for more information from the CDC on how to protect yourself.  If you are sick with COVID-19 or suspect you are infected with the virus that causes COVID-19, follow the steps here from the CDC to help prevent the disease from spreading to people in your home and community.  Click here for general information from the CDC on testing.  If you develop any of these emergency warning signs for COVID-19, get medical attention immediately:     Trouble breathing    Persistent pain or pressure in the chest     New confusion or inability to arouse    Bluish lips or face      Call your doctor or clinic before going in. Call 911 if you have a medical emergency and notify the  you have or think you may have COVID-19.  For more detailed and up to date information on COVID-19 (Coronavirus), please visit the CDC website.   Diagnosis: Refer for additional evaluation  Diagnosis ICD: R69

## 2020-07-19 NOTE — PATIENT INSTRUCTIONS
follow up with your primary care provider for further evaluation of the shortness of breath, especially if it keeps worsening    Call us for the COVID-19 test results if you don't hear from us in 3 days.

## 2020-07-19 NOTE — PROGRESS NOTES
SUBJECTIVE:   Delicia Henry is a 39 year old female presenting with a chief complaint of worsening off/on troubles breathing (not getting enough breaths), wheezing (twice over the past week, occurred at the end of expiration only)..   .  Onset of symptoms was 9 days ago.   Course of illness is worsening.    Treatment measures tried include Albuterol MDI with temporary relief..  .  Predisposing factors include:  None. .  No headaches  No chest pain  No sore throat.    No fevers.    No cough  No loss of taste/smell   No stuffy nose.   No diarrhea/vomiting  No abdominal pain    In Mid-April, patient experienced shortness of breath, chest pain, fever, cough.  Patient was wondering if she had a COVID-19 Test at that time.  Her primary care provider ordered a COVID-19 antibody test on June 2, 2020; however, patient had not heard from the primary care clinic to get the lab test done.          Past Medical History:   Diagnosis Date     Asthma     exercise induced asthma     MEDICAL HISTORY OF -     recurrent uti's urethral dilitation , tigonitis.     Molar pregnancy      Current Outpatient Medications   Medication Sig Dispense Refill     albuterol (PROAIR HFA/PROVENTIL HFA/VENTOLIN HFA) 108 (90 Base) MCG/ACT inhaler INHALE 2 PUFFS INTO THE LUNGS EVERY 6 HOURS AS NEEDED FOR SHORTNESS OF BREATH OR DIFFICULT BREATHING OR WHEEZING 6.7 g 0     methocarbamol (ROBAXIN) 500 MG tablet Take 1-2 tablets (500-1,000 mg) by mouth 3 times daily as needed for muscle spasms 90 tablet 0     Social History     Tobacco Use     Smoking status: Never Smoker     Smokeless tobacco: Never Used   Substance Use Topics     Alcohol use: No       ROS:  CONSTITUTIONAL:NEGATIVE for fever, chills, change in weight  INTEGUMENTARY/SKIN: NEGATIVE for cyanosis.     ENT/MOUTH:  No nasal/throat/ear problems.   RESP: positive for shortness of brath.   CV: negative for chest pain.   GI: no vomiting/diarrhea.   NEURO: no headache.     OBJECTIVE:    BP:   118/80  Pulse:  88.  Temp:  37.1 C (98.8 F)  Oxygen Saturation:  100% on room air  GENERAL APPEARANCE: healthy, alert and no distress.  No cough.  No acute respiratory distress.  Patient has a non-toxic appearance.   RESP: lungs clear to auscultation - no rales, rhonchi or wheezes  CV: regular rates and rhythm, normal S1 S2, no murmur noted  SKIN: no suspicious lesions or rashes.  No pallor/cyanosis.     chest x-ray:  I viewed all X-ray images during this clinic encounter. The chest x-ray showed no effusions/infiltrates/masses/cardiomegaly.        ASSESSMENT:  Dyspnea.  chest x-ray shows no evidence of effusions/cardiomegaly/pneumonia/pneumothorax.      PLAN:  Pending labs:  COVID-19 PCR and COVID-19 antibody test.    Patient will call us if she is not informed of the COVID-19 test results in 3 days.     follow up with the primary care provider for further evaluation.      Smith Iniguez MD

## 2020-07-19 NOTE — LETTER
July 22, 2020        Delicia Henry  [address]  [facility] [address]      COVID-19 Antibody Screen   Date Value Ref Range Status   07/19/2020 Negative  Final     Comment:     No COVID-19 antibodies detected.  Patients within 10 days of symptom onset for   COVID-19 may not produce sufficient levels of detectable antibodies.    Immunocompromised COVID-19 patients may take longer to develop antibodies.       COVID-19 Antibody, IgG Titer   Date Value Ref Range Status   07/19/2020 Not Applicable  Final     Comment:     Qualitative screen for total antibodies to COVID-19 (SARS-CoV-2) with   semi-quantitative measurement of IgG COVID-19 antibodies by endpoint titer.    COVID-19 antibodies may be elevated due to a past or current infection.  Negative results do not rule out COVID-19 infection.  Results from antibody   testing should not be used as the sole basis to diagnose or exclude SARS-CoV-2   infection or to inform infection status.  COVID-19 PCR test should be ordered   if current infection is suspected.  False positive results may occur in rare   cases due to cross-reacting antibodies.  This test was developed and its performance characteristics determined by the   [facility] ([facility]),   which is regulated under CLIA as qualified to perform high-complexity testing.    This test has not been reviewed by the FDA.  Testing performed by [facility], [facility], [address], [address], [place] [address]         No results found for: COVAB      You have tested NEGATIVE for COVID-19 antibodies. This suggests you have not had or been exposed to COVID-19. But it does not mean that for sure.     The test finds antibodies in most people 10 days after they get sick. For some people, it takes longer than 10 days for antibodies to show up. Others may never show antibodies against COVID-19,  especially if they have weak immune systems.    If you have COVID-19 symptoms now, please stay home and away from others.     What is antibody testing?    This is a kind of blood test. We take a small sample of your blood, and then test it for something called  antibodies.      Your body makes antibodies to fight infection. If your blood has antibodies for a certain germ, it means you ve been infected with that germ in the past.     Sometimes, antibodies stay in your body for years after you ve had the infection. They can be there even if the germ didn t make you sick. They are a sign that your body fought off the infection.    Will this test find antibodies in everyone who s had COVID-19?    No. The test finds antibodies in most people 10 days after they get sick. For some people, it takes longer than 10 days for antibodies to show up. Others may never show antibodies against COVID-19, especially if they have weak immune systems.    What does it mean if the test finds COVID-19 antibodies?    If we find these antibodies, it suggests:     This person has had the virus.     Their body s immune system fought the virus.     We don t know if this will help protect someone from getting COVID-19 again. Scientists are still learning about this.    What are the signs of COVID-19?    Signs of COVID-19 can appear from 2 to 14 days (up to 2 weeks) after you re infected. Some people have no symptoms or only mild symptoms. Others get very sick. The most common symptoms are:          Cough    Shortness of breath or trouble breathing  Or at least 2 of these symptoms:    Fever    Chills    Repeated shaking with chills    Muscle pain    Headache    Sore throat    Losing your sense of taste or smell    You may have other symptoms. Please contact your doctor or clinic for any symptoms that worry you.    Where can I get more information?     To learn the [facility] guidelines for staying home, please visit the [place] Department of  Health website at [email or web address]    To learn more about COVID-19 and how to care for yourself at home, please visit the CDC website at https://www.cdc.gov/coronavirus/2019-ncov/about/steps-when-sick.html    For more options for care at [facility], please visit our website at [email or web address]    [place] Department of [facility] ([facility]) COVID-19 Hotline:  [phone]

## 2020-07-20 LAB
SARS-COV-2 RNA SPEC QL NAA+PROBE: NOT DETECTED
SPECIMEN SOURCE: NORMAL

## 2020-07-21 LAB
COVID-19 SPIKE RBD ABY TITER: NORMAL
COVID-19 SPIKE RBD ABY: NEGATIVE

## 2020-07-22 ENCOUNTER — APPOINTMENT (OUTPATIENT)
Dept: CARDIOLOGY | Facility: CLINIC | Age: 40
End: 2020-07-22
Attending: EMERGENCY MEDICINE
Payer: COMMERCIAL

## 2020-07-22 ENCOUNTER — HOSPITAL ENCOUNTER (EMERGENCY)
Facility: CLINIC | Age: 40
Discharge: HOME | End: 2020-07-22
Attending: EMERGENCY MEDICINE | Admitting: EMERGENCY MEDICINE
Payer: COMMERCIAL

## 2020-07-22 VITALS
DIASTOLIC BLOOD PRESSURE: 91 MMHG | RESPIRATION RATE: 14 BRPM | OXYGEN SATURATION: 100 % | SYSTOLIC BLOOD PRESSURE: 132 MMHG

## 2020-07-22 DIAGNOSIS — R06.00 DYSPNEA, UNSPECIFIED TYPE: ICD-10-CM

## 2020-07-22 DIAGNOSIS — R00.2 PALPITATIONS: ICD-10-CM

## 2020-07-22 LAB
ANION GAP SERPL CALCULATED.3IONS-SCNC: 5 MMOL/L (ref 3–14)
BASOPHILS # BLD AUTO: 0.1 10E9/L (ref 0–0.2)
BASOPHILS NFR BLD AUTO: 1.4 %
BUN SERPL-MCNC: 10 MG/DL (ref 7–30)
CALCIUM SERPL-MCNC: 8.6 MG/DL (ref 8.5–10.1)
CHLORIDE SERPL-SCNC: 110 MMOL/L (ref 94–109)
CO2 SERPL-SCNC: 25 MMOL/L (ref 20–32)
CREAT SERPL-MCNC: 0.94 MG/DL (ref 0.52–1.04)
D DIMER PPP FEU-MCNC: <0.3 UG/ML FEU (ref 0–0.5)
DIFFERENTIAL METHOD BLD: NORMAL
EOSINOPHIL # BLD AUTO: 0.1 10E9/L (ref 0–0.7)
EOSINOPHIL NFR BLD AUTO: 1 %
ERYTHROCYTE [DISTWIDTH] IN BLOOD BY AUTOMATED COUNT: 11.5 % (ref 10–15)
GFR SERPL CREATININE-BSD FRML MDRD: 75 ML/MIN/{1.73_M2}
GLUCOSE SERPL-MCNC: 94 MG/DL (ref 70–99)
HCT VFR BLD AUTO: 44 % (ref 35–47)
HGB BLD-MCNC: 14.5 G/DL (ref 11.7–15.7)
IMM GRANULOCYTES # BLD: 0 10E9/L (ref 0–0.4)
IMM GRANULOCYTES NFR BLD: 0.5 %
LYMPHOCYTES # BLD AUTO: 1.2 10E9/L (ref 0.8–5.3)
LYMPHOCYTES NFR BLD AUTO: 18.9 %
MCH RBC QN AUTO: 30.6 PG (ref 26.5–33)
MCHC RBC AUTO-ENTMCNC: 33 G/DL (ref 31.5–36.5)
MCV RBC AUTO: 93 FL (ref 78–100)
MONOCYTES # BLD AUTO: 0.4 10E9/L (ref 0–1.3)
MONOCYTES NFR BLD AUTO: 5.6 %
NEUTROPHILS # BLD AUTO: 4.5 10E9/L (ref 1.6–8.3)
NEUTROPHILS NFR BLD AUTO: 72.6 %
NRBC # BLD AUTO: 0 10*3/UL
NRBC BLD AUTO-RTO: 0 /100
PLATELET # BLD AUTO: 271 10E9/L (ref 150–450)
POTASSIUM SERPL-SCNC: 3.9 MMOL/L (ref 3.4–5.3)
RBC # BLD AUTO: 4.74 10E12/L (ref 3.8–5.2)
SODIUM SERPL-SCNC: 140 MMOL/L (ref 133–144)
TSH SERPL DL<=0.005 MIU/L-ACNC: 1.47 MU/L (ref 0.4–4)
WBC # BLD AUTO: 6.2 10E9/L (ref 4–11)

## 2020-07-22 PROCEDURE — 99284 EMERGENCY DEPT VISIT MOD MDM: CPT

## 2020-07-22 PROCEDURE — 80048 BASIC METABOLIC PNL TOTAL CA: CPT | Performed by: EMERGENCY MEDICINE

## 2020-07-22 PROCEDURE — 85025 COMPLETE CBC W/AUTO DIFF WBC: CPT | Performed by: EMERGENCY MEDICINE

## 2020-07-22 PROCEDURE — 85379 FIBRIN DEGRADATION QUANT: CPT | Performed by: EMERGENCY MEDICINE

## 2020-07-22 PROCEDURE — 0298T LEADLESS EKG MONITOR 3 TO 14 DAYS: CPT | Performed by: INTERNAL MEDICINE

## 2020-07-22 PROCEDURE — 93005 ELECTROCARDIOGRAM TRACING: CPT | Mod: 59

## 2020-07-22 PROCEDURE — 84443 ASSAY THYROID STIM HORMONE: CPT | Performed by: EMERGENCY MEDICINE

## 2020-07-22 PROCEDURE — 0296T LEADLESS EKG MONITOR 3 TO 14 DAYS: CPT

## 2020-07-22 ASSESSMENT — ENCOUNTER SYMPTOMS
SHORTNESS OF BREATH: 1
PALPITATIONS: 1

## 2020-07-22 NOTE — ED PROVIDER NOTES
"  History   Chief Complaint  Shortness of Breath      HPI   Delicia Henry is a 39 year old female with a history of asthma who presents to the emergency department for evaluation of shortness of breath. The patient reports \"she doesn't feel as though air is getting into her body\" and that \"her heart is shooting out of her body\", although she notes her heart rate appears only slightly irregular on her Apple Watch. She has an inhaler which she has been using regularly, although she does not find this helpful. She was similarly ill in March with presumed COVID-19 and again in April, which was attributed to anxiety. She was seen for her current symptoms in urgent care 3 days ago and was tested for COVID-19 and had an antibody test, both of which were negative. She also had a chest x-ray done, which was normal.     Urgent Care Visit 07/19/2020:  Imaging:  Chest XR 2 Views  The cardiac silhouette and pulmonary vasculature are within normal limits. No focal pulmonary consolidations. No pleural effusion or pneumothorax.    Laboratory:  COVID-19 Antibody Screen: negative   COVID-19 Virus PCR: negative     Allergies  Avelox  Erythromycin  Sulfa Drugs    Medications  Albuterol inhaler     Past Medical History  Asthma     Past Surgical History  The patient does not have any pertinent past surgical history.     Family History  No past pertinent family history.    Social History:  Smoking Status: Never Smoker  Smokeless Tobacco: Never Used  Alcohol Use: No  Drug Use: No  [facility]P: [facility]    Review of Systems   Respiratory: Positive for shortness of breath.    Cardiovascular: Positive for palpitations.   All other systems reviewed and are negative.    Physical Exam     No data found.    Physical Exam    General: Patient is alert and cooperative.  HENT:  Normal nose, oropharynx. Moist oral mucosa.  Eyes: EOMI. Normal conjunctiva.  Neck:  Normal range of motion and appearance.   Cardiovascular:  Normal " Pt given Toradol 10 mg PO for complaints of cramping. Pt is tearful and asks for me to phone physician for more pain medication because \"nothing is helping\". Physician made aware. Orders received. rate, regular rhythm and normal heart sounds.   Pulmonary/Chest:  Effort normal. No wheezing or crackles.  Abdominal: Soft. No distension or tenderness.     Musculoskeletal: Normal range of motion. No edema or tenderness.   Neurological: oriented, normal strength, sensation, and coordination.   Skin: Warm and dry. No rash or bruising.   Psychiatric: Normal mood and affect. Normal behavior and judgement.    Emergency Department Course     ECG:  ECG taken at 1304, ECG read at 1310  Normal sinus rhythm  Normal ECG  Rate 81 bpm. GA interval 146 ms. QRS duration 80 ms. QT/QTc 386/448 ms. P-R-T axes 59 57 57.    Imaging:  Radiology findings were communicated with the patient who voiced understanding of the findings.    Leadless EKG Monitor 3-14 Days: Results pending    Laboratory:  Laboratory findings were communicated with the patient who voiced understanding of the findings.    CBC: WBC: 6.2, HGB: 14.5, PLT: 271    BMP: Glucose 94, chloride 110 (H), o/w WNL (Creatinine: 0.94)    D-dimer: <0.3    TSH with free T4: 1.47    Emergency Department Course:  Past medical records, nursing notes, and vitals reviewed.    1245 I performed an exam of the patient as documented above.     1307 EKG obtained in the ED, see results above.     1309 IV was inserted and blood was drawn for laboratory testing, results above.    1319 I rechecked the patient and discussed the results of the workup thus far.     1338 The patient was sent for imaging while in the emergency department, results above.     Findings and plan explained to the Patient. Patient discharged home with instructions regarding supportive care, medications, and reasons to return. The importance of close follow-up was reviewed.     I personally reviewed the laboratory and imaging results with the Patient and answered all related questions prior to discharge.     Impression & Plan     Medical Decision Making:  Delicia Henry is a 39 year old female with complaints of  palpitations and dyspnea.  Symptoms go back to April at which time it sounds like clinically it was suspected she had COVID-19.  Testing was not performed.  She said no recent fevers and generally no significant medical problems.  She was seen in urgent care a couple days ago and had a normal chest x-ray performed.  There is no family history of PE, DVT, or early heart disease.  She has a normal physical examination including heart rate, respiratory rate, and oxygen saturations.  Testing is been reassuring as well.  An EKG depicts a sinus rhythm with no ectopy and normal intervals.  Laboratory tests are within normal limits to include a TSH, d-dimer, CBC, and BMP.  There is no concern for an ischemic process.  Chest x-ray was not repeated.  Etiology for her episodic symptoms is undetermined.  Suspicion for a true cardiac dysrhythmia is low.  I was able to set her up with a ZIO Patch for 5 days and have recommended she follow-up with her clinic for reevaluation afterwards to discuss results and further management of her symptoms.  We discussed the possibility that this could be anxiety mediated.  She is encouraged discussed this and her other symptoms with her primary clinic at time of follow-up.      Diagnosis:    ICD-10-CM    1. Palpitations  R00.2 CBC with platelets differential     D dimer quantitative     Basic metabolic panel     TSH with free T4 reflex   2. Dyspnea, unspecified type  R06.00        Disposition:  Discharged to home.    Discharge Medications:  New Prescriptions    No medications on file       Scribe Disclosure:  Riddhi GARCIA, am serving as a scribe at 12:39 PM on 7/22/2020 to document services personally performed by Shreyas Solorzano MD based on my observations and the provider's statements to me.      Shreyas Solorzano MD  07/23/20 0534

## 2020-07-22 NOTE — ED AVS SNAPSHOT
[facility] Emergency Department  [address]  [facility] [address]  Phone:  [phone]  Fax:  [phone]                                    Delicia Henry   MRN: [id number]    Department:  [facility] Emergency Department   Date of Visit:  7/22/2020           After Visit Summary Signature Page    I have received my discharge instructions, and my questions have been answered. I have discussed any challenges I see with this plan with the nurse or doctor.    ..........................................................................................................................................  Patient/Patient Representative Signature      ..........................................................................................................................................  Patient Representative Print Name and Relationship to Patient    ..................................................               ................................................  Date                                   Time    ..........................................................................................................................................  Reviewed by Signature/Title    ...................................................              ..............................................  Date                                               Time          22EPIC Rev 08/18

## 2020-07-22 NOTE — ED NOTES
Patient tolerated placement of holter monitor. Alert and oriented. NSR on cardiac monitor. Oxygen saturation stable on room air. MD in to see patient and discuss diagnosis, test results, and discharge plan. Patient meets discharge criteria. Discussed AVS with patient. Discussed anxiety reduction strategies as well as reducing caffeine intake. Questions answered. Patient verbalized understanding. Patient reports being ready to go home. Patient discharged home by car with all necessary information.

## 2020-07-23 LAB — INTERPRETATION ECG - MUSE: NORMAL

## 2020-08-09 ENCOUNTER — HOSPITAL ENCOUNTER (EMERGENCY)
Facility: CLINIC | Age: 40
Discharge: HOME | End: 2020-08-09
Attending: PHYSICIAN ASSISTANT | Admitting: PHYSICIAN ASSISTANT
Payer: COMMERCIAL

## 2020-08-09 VITALS
OXYGEN SATURATION: 98 % | TEMPERATURE: 97.8 F | SYSTOLIC BLOOD PRESSURE: 136 MMHG | RESPIRATION RATE: 18 BRPM | DIASTOLIC BLOOD PRESSURE: 89 MMHG | HEART RATE: 80 BPM

## 2020-08-09 DIAGNOSIS — R06.00 DYSPNEA, UNSPECIFIED TYPE: ICD-10-CM

## 2020-08-09 DIAGNOSIS — F41.1 ANXIETY REACTION: ICD-10-CM

## 2020-08-09 PROCEDURE — 25000132 ZZH RX MED GY IP 250 OP 250 PS 637: Performed by: PHYSICIAN ASSISTANT

## 2020-08-09 PROCEDURE — 99283 EMERGENCY DEPT VISIT LOW MDM: CPT

## 2020-08-09 RX ORDER — LORAZEPAM 1 MG/1
0.5 TABLET ORAL EVERY 8 HOURS PRN
Qty: 5 TABLET | Refills: 0 | Status: SHIPPED | OUTPATIENT
Start: 2020-08-09 | End: 2020-08-17

## 2020-08-09 RX ORDER — LORAZEPAM 0.5 MG/1
1 TABLET ORAL ONCE
Status: COMPLETED | OUTPATIENT
Start: 2020-08-09 | End: 2020-08-09

## 2020-08-09 RX ADMIN — LORAZEPAM 1 MG: 0.5 TABLET ORAL at 16:15

## 2020-08-09 ASSESSMENT — ENCOUNTER SYMPTOMS
NUMBNESS: 1
SHORTNESS OF BREATH: 1

## 2020-08-09 NOTE — ED AVS SNAPSHOT
[facility] Emergency Department  [address]  [facility] [address]  Phone:  [phone]  Fax:  [phone]                                    Delicia Henry   MRN: [id number]    Department:  [facility] Emergency Department   Date of Visit:  8/9/2020           After Visit Summary Signature Page    I have received my discharge instructions, and my questions have been answered. I have discussed any challenges I see with this plan with the nurse or doctor.    ..........................................................................................................................................  Patient/Patient Representative Signature      ..........................................................................................................................................  Patient Representative Print Name and Relationship to Patient    ..................................................               ................................................  Date                                   Time    ..........................................................................................................................................  Reviewed by Signature/Title    ...................................................              ..............................................  Date                                               Time          22EPIC Rev 08/18

## 2020-08-09 NOTE — ED PROVIDER NOTES
"  History     Chief Complaint:    Shortness of Breath      HPI   Delicia Henry is a 39 year old female who presents with ongoing shortness of breath. Patient was seen in early March with presumed Covid symptoms but was never tested. She reports that she had all the symptoms and recovered fine. In the middle of April she was seen in the ED due to having trouble catching her breath and tightness in her back. She denies chest pain at that time. She had an extensive work-up done at this visit, see work up below. Next, she saw her PCP who thought it was muscle related because she is a . She felt fine for a few days with slight intermittent symptoms. Again, she went to  in middle of July for similar symptoms as before and obtained more work-up, see results below. A few days later she presented to the ER again for more work-up due to lack of answers and ongoing symptoms. At this visit they put her on a holter monitor with no results yet.     Since her last visit she has been feeling okay until about an hour prior to arrival she began to feel short of breath again. At that time she also noted numbness in her fingers and \"not being able to function\". She used her Albuterol inhaler and tried deep breathing exercises with no relief. Upon her arrival here she started to calm down and symptoms improved. She has no history of anxiety. She was using Depo contraceptive for 10 years between the birth of her children and reports no issue      Lab results from 04/18/2020:    D dimer: <0.3     Troponin 0059: 0.00     CMP: Potassium 3.2(L), Glucose 111(H), o/w WNL (Creatinine 0.97)     CBC: WNL (WBC 8.2, HGB 14, )    Lab results from 07/22/2020:    CBC: WBC: 6.2, HGB: 14.5, PLT: 271     BMP: Glucose 94, chloride 110 (H), o/w WNL (Creatinine: 0.94)     D-dimer: <0.3     TSH with free T4: 1.47      Cardiac Risk Factors:  SEX:              female  Tobacco:            "   Negative  Obesity:   Negative  Hypertension:       Negative  Diabetes:             Negative  Lipids:                Negative  Personal history:  Negative  Family History:       Negative    PE and DVT Risk Factors:  Personal hx of PE/DVT:  Negative  Family hx of PE/DVT:   Negative  Recent travel:    Negative  Recent surgery:   Negative  Other immobilizations:  Negative  Hx cancer:    Negative  Hormone use:   Positive- Depo      Allergies:  Avelox  Erythromycin  Sulfa Drugs    Medications:    Albuterol  Robaxin    Past Medical History:    Congenital fusion of kidneys  Cleft palate  Asthma  Molar pregnancy    Past Surgical History:    D&C suction  Benign tumor removed under ear    Family History:    Sister: Kidney issues  Mother: Kidney issues  Son: Mitral heart defect    Social History:  Smoking Status: Never Smoker  Smokeless Tobacco: Never Used  Alcohol Use: Negative  Drug Use: Negative  [facility]    Marital Status:       Review of Systems   Respiratory: Positive for shortness of breath (resolved).    Neurological: Positive for numbness (resolved).   All other systems reviewed and are negative.    Physical Exam     Patient Vitals for the past 24 hrs:   BP Temp Temp src Pulse Resp SpO2   08/09/20 1532 136/89 97.8  F (36.6  C) Oral 80 18 98 %       Physical Exam  General: Alert and interactive. Appears well. Cooperative and pleasant.   Eyes: The pupils are equal and round. EOMs intact. No scleral icterus.  ENT: No abnormalities to the external nose or ears. Mucous membranes moist. Posterior oropharynx is non-erythematous.  Neck: Trachea is in the midline. No nuchal rigidity.     CV: Regular rate and rhythm. S1 and S2 normal without murmur, click, gallop or rub.   Resp: Breath sounds are clear bilaterally, without rhonchi, wheezes, rales. Non-labored, no retractions or accessory muscle use.     GI: Abdomen is soft without distension. No tenderness to palpation. No peritoneal signs.     MS: Moving all extremities well. Good muscle tone.   Skin: Warm and dry. No rash or lesions noted.  Neuro: Alert and oriented x 3. No focal neurologic deficits. Good strength and sensation in upper and lower extremities. Psych: Awake. Alert.  Normal affect. Appropriate interactions.  Lymph: No anterior or posterior cervical lymphadenopathy noted.    Emergency Department Course     Interventions:  1615 Ativan 1 mg PO    Emergency Department Course:  Past medical records, nursing notes, and vitals reviewed.    1551 I performed an exam of the patient as documented above.     1655 I rechecked the patient and discussed the results of her workup thus far.     Findings and plan explained to the Patient. Patient discharged home with instructions regarding supportive care, medications, and reasons to return. The importance of close follow-up was reviewed. The patient was prescribed Ativan.    I personally answered all related questions prior to discharge.     Impression & Plan     Medical Decision Making:  Delicia Henry is a 39 year old female who presents for evaluation of ongoing shortness of breath with bilateral arm tingling. The patient has had multiple work-ups for similar symptoms in our Emergency Department twice and in UC since March. This included negative COVID testing, multiple EKGs and negative troponins, recent negative d-dimer, negative CXR, and recent Holter with results still pending. She had an episode earlier this evening in which she felt as though she could not catch her breath and could barely function. The patient felt much improved upon her arrival in the emergency department. She was given Ativan and continued to feel improved throughout her stay. She has never been diagnosed with anxiety, but I suspect there is a large component of anxiety at play here. She has normal vitals here and is very low risk for sinister cardiopulmonary pathology. I have suggest following up with PCP and  pulmonology (as scheduled). She will have her albuterol inhaler available along with Ativan for times when she feels these symptoms coming on. She will return here immediately with any change in symptoms.     Diagnosis:    ICD-10-CM    1. Anxiety reaction  F41.1    2. Dyspnea, unspecified type  R06.00        Disposition:  Discharged to home.    Discharge Medications:  New Prescriptions    LORAZEPAM (ATIVAN) 1 MG TABLET    Take 0.5 tablets (0.5 mg) by mouth every 8 hours as needed for anxiety       Scribe Disclosure:  I, Octavio Villar, am serving as a scribe at 3:43 PM on 8/9/2020 to document services personally performed by Vidya Manjarrez PA-C based on my observations and the provider's statements to me.        Vidya Manjarrez PA-C  08/10/20 0124

## 2020-08-09 NOTE — ED TRIAGE NOTES
Patient reports SOB 1 hour ago along with arms/hands tingling. Patient reports has resolved once at ED. Patient not sure if she is having anxiety/panic attacks. ABC's intact.

## 2020-08-09 NOTE — DISCHARGE INSTRUCTIONS
Trial Ativan.   Use your Albuterol for shortness of breath.   Follow up with primary care for reassessment.

## 2020-08-11 ENCOUNTER — TELEPHONE (OUTPATIENT)
Dept: FAMILY MEDICINE | Facility: CLINIC | Age: 40
End: 2020-08-11

## 2020-08-11 NOTE — TELEPHONE ENCOUNTER
Reviewed chart. OK to wait until the 17th, however, we will need to switch this from a Nexplanon placement to an ER follow up visit. Will reschedule Nexplanon to the following day. Attempted to call patient twice and phone goes straight to voicemail. Left a non-detailed voicemail for patient (sounds like a shared business phone) to call clinic back to discuss appointment.  PAUL Moscoso, RN  [facility]

## 2020-08-13 ENCOUNTER — MYC MEDICAL ADVICE (OUTPATIENT)
Dept: FAMILY MEDICINE | Facility: CLINIC | Age: 40
End: 2020-08-13

## 2020-08-13 NOTE — PROGRESS NOTES
Pre-Visit Planning     Future Appointments   Date Time Provider Department Center   8/17/2020  4:50 PM Crissy Rutherford APRN CNP [facility]   8/18/2020  2:10 PM Crissy Rutherford APRN CNP [facility]     Arrival Time for this Appointment:  1:50 PM   Appointment Notes for this encounter:   nexplanon insertion    Questionnaires Reviewed/Assigned  No additional questionnaires are needed    Patient contact not needed. - see notes from 8/17/20 encounter.  Mer Batres, SARAN, RN  [facility]

## 2020-08-13 NOTE — PROGRESS NOTES
Pre-Visit Planning     Future Appointments   Date Time Provider Department Center   8/17/2020  4:50 PM Crissy Rutherford APRN CNP [facility]   8/18/2020  2:10 PM Crissy Rutherford APRN CNP [facility]     Arrival Time for this Appointment:  4:30 PM   Appointment Notes for this encounter:   ER follow up    Questionnaires Reviewed/Assigned  Additional questionnaires assigned - PHQ-9 and ANNA-7      Communicated with patient via Youbetme. Are there any additional questions or concerns you'd like to review with your provider during your visit? No - continued concerns with breathing and anxiety. See Youbetme message from patient below:    Hi there!  My ER follow up, I am just looking for a little help on why I am still having trouble with my breathing.   it seems to go away and be fine for weeks or months and then just hits me, out of the blue,  again for no reason and lingers for a week or 2. Last time was 2 weeks exactly. And this time I m on day 9.  The inhaler helps most days and then sometimes not at all. The medication they gave me in the Er does help if I have a full on  anxiety attack is what they are calling it . But then that seems to linger like I m on the verge all day for the next few days. I tried the medication on a day that the hard of breathing has lingered w/o a full on  attack  and it does help quite a bit and take the edge off but it s not a 100% fix, To help me breath completely normal. I m not sure if this is the whole problem or if I have 2 different issues going on at the same time, and one is making the other worse.  I d like to have someone check my lungs again  , possible X-ray? just to make sure they are all clear because they do not feel that way. I feel great in the morning and then by 12p it starts to go down hill throughout the day.  I m just looking for some answers or help, it s no fun being in the ER every 2 weeks.   Thank you for reaching out, I m looking forward to my appointment.    Visit is  not preventive.    Of note, patient has had one Urgent Care and two ER visits since last appointment with us.    Meds  Is there anything on your medication list that needs to be updated? No    Current Outpatient Medications   Medication     albuterol (PROAIR HFA/PROVENTIL HFA/VENTOLIN HFA) 108 (90 Base) MCG/ACT inhaler     LORazepam (ATIVAN) 1 MG tablet     methocarbamol (ROBAXIN) 500 MG tablet     Current Facility-Administered Medications   Medication     medroxyPROGESTERone (DEPO-PROVERA) injection 150 mg     Which pharmacy do you prefer to use for medications during this visit if needed? [facility] [address] - updated    Do you need refills on any of your medications? No    Health Maintenance Due   Topic Date Due     ANNUAL REVIEW OF HM ORDERS  1980     Patient is due for:  mammogram  Can assist with scheduling at appointment    Orecon  Patient is active on Orecon.    Questionnaire Review   Offered information on completing questionnaires via Orecon.    SARA MoscosoN, RN  [facility]

## 2020-08-14 NOTE — PROGRESS NOTES
Subjective     Delicia Francoise Henyr is a 39 year old female who presents to clinic today for the following health issues:    HPI     ED/UC Followup:    Facility:  [facility]  Date of visit: 8/9/20  Reason for visit: shortness of breath  Current Status: has gotten better, did use inhaler earlier today and shortness of breath resolved     Patient reports feeling physically fine, no illness symptoms.  Will get acute shortness of breath that lasts variable amounts of time.      Previously happened in July for 2 weeks and then resolved.  +Palpitations during ED visit on 7/22/2020.      Returned to ED on 8/9/22020 for shortness of breath - had a 1 hour episode prior to ED visit, arms tingly, felt incapacitated by last episode.  Was treated with lorazepam, which was helpful at the time. Has used Lorazepam 0.5 mg 3 times the past 1 week.  Used inhaler early this week which helped.  Would then notice the shortness of breath return and took Lorazepam which helped, but still felt underlying shortness of breath.  Had another of episode of shortness of breath on Sunday, took a Lorazepam and this completely resolved the shortness of breath.      No further significant palpitations with shortness of breath.     Doesn't feel like she is stressed out.    Mother, grandmother, aunt with bipolar disorder (without medical diagnoses).  Grandmother recently received an official diagnosis while in the nursing home.    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    6 week history of illness (early pandemic, felt like she had COVID).      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      Eating and sleeping well.  Nutrition is good.   Working out 5 days per week.    Alcohol:  Rare wine use.    No smoking.  Patient Active Problem List   Diagnosis     Cleft palate     Congenital fusion of kidneys     Past Surgical  History:   Procedure Laterality Date     DILATION AND CURETTAGE SUCTION  2/13/2012    Procedure:DILATION AND CURETTAGE SUCTION; DILATION AND CURETTAGE SUCTION ; Surgeon:LEIGHTON GALLAGHER; Location:[facility] OR     ENT SURGERY      tumor rem under ear, benign       Social History     Tobacco Use     Smoking status: Never Smoker     Smokeless tobacco: Never Used   Substance Use Topics     Alcohol use: No     Family History   Problem Relation Age of Onset     Allergies Father         seasonal     Allergies Sister         seasonal     Thyroid Disease Sister      Thyroid Disease Mother      Diabetes Paternal Grandfather          Current Outpatient Medications   Medication Sig Dispense Refill     LORazepam (ATIVAN) 0.5 MG tablet Take 1 tablet (0.5 mg) by mouth every 8 hours as needed for anxiety 8 tablet 0     albuterol (PROAIR HFA/PROVENTIL HFA/VENTOLIN HFA) 108 (90 Base) MCG/ACT inhaler INHALE 2 PUFFS INTO THE LUNGS EVERY 6 HOURS AS NEEDED FOR SHORTNESS OF BREATH OR DIFFICULT BREATHING OR WHEEZING 6.7 g 0     Allergies   Allergen Reactions     Avelox Swelling     Swelling tongue, dizzy, itching     Erythromycin Nausea     Sulfa Drugs      Unknown reaction       Reviewed and updated as needed this visit by Provider         Review of Systems   Constitutional, HEENT, cardiovascular, pulmonary, gi and gu systems are negative, except as otherwise noted.      Objective    /70   Pulse 116   Temp 98.9  F (37.2  C) (Tympanic)   Wt 67.6 kg (149 lb)   SpO2 98%   BMI 23.34 kg/m    Body mass index is 23.34 kg/m .  Physical Exam   GENERAL: healthy, alert and no distress  RESP: lungs clear to auscultation - no rales, rhonchi or wheezes  CV: regular rate and rhythm, normal S1 S2, no S3 or S4, no murmur, click or rub, no peripheral edema  PSYCH: mentation appears normal, affect normal/bright, judgement and insight intact and appearance well groomed            Assessment & Plan     Delicia was seen today for er f/u.    Diagnoses and  all orders for this visit:    Dyspnea, unspecified type  2 ED visit over the past 2 months due to acute onset dyspnea.   Normal labs, EKG, Holter monitor, chest x-ray during ED visits.    Will plan to proceed with PFT's to rule out obstructive/reactive lung disease.    -     General PFT Lab (Please always keep checked); Future  -     Pulmonary Function Test; Future    Anxiety  ANNA-7 SCORE 8/17/2020   Total Score 2 (minimal anxiety)   Total Score 2     PHQ 8/17/2020   PHQ-9 Total Score 1   Q9: Thoughts of better off dead/self-harm past 2 weeks Not at all     Will give additional prescription for Lorazepam #8 while PFT results pending.    Discussed transitioning to daily selective serotonin reuptake inhibitor for treatment of anxiety versus reactive treatment with benzodiazepine.    -     LORazepam (ATIVAN) 0.5 MG tablet; Take 1 tablet (0.5 mg) by mouth every 8 hours as needed for anxiety          Return in about 4 weeks (around 9/14/2020) for Telephone Visit - follow-up shortness of breath.    MINH Lockwood [facility]

## 2020-08-17 ENCOUNTER — OFFICE VISIT (OUTPATIENT)
Dept: FAMILY MEDICINE | Facility: CLINIC | Age: 40
End: 2020-08-17
Payer: COMMERCIAL

## 2020-08-17 VITALS
DIASTOLIC BLOOD PRESSURE: 70 MMHG | TEMPERATURE: 98.9 F | SYSTOLIC BLOOD PRESSURE: 124 MMHG | HEART RATE: 116 BPM | OXYGEN SATURATION: 98 % | WEIGHT: 149 LBS | BODY MASS INDEX: 23.34 KG/M2

## 2020-08-17 DIAGNOSIS — F41.9 ANXIETY: ICD-10-CM

## 2020-08-17 DIAGNOSIS — R06.00 DYSPNEA, UNSPECIFIED TYPE: Primary | ICD-10-CM

## 2020-08-17 PROCEDURE — 99214 OFFICE O/P EST MOD 30 MIN: CPT | Performed by: NURSE PRACTITIONER

## 2020-08-17 RX ORDER — LORAZEPAM 0.5 MG/1
0.5 TABLET ORAL EVERY 8 HOURS PRN
Qty: 8 TABLET | Refills: 0 | Status: SHIPPED | OUTPATIENT
Start: 2020-08-17 | End: 2020-08-18

## 2020-08-17 ASSESSMENT — ANXIETY QUESTIONNAIRES
GAD7 TOTAL SCORE: 2
1. FEELING NERVOUS, ANXIOUS, OR ON EDGE: SEVERAL DAYS
3. WORRYING TOO MUCH ABOUT DIFFERENT THINGS: NOT AT ALL
4. TROUBLE RELAXING: SEVERAL DAYS
GAD7 TOTAL SCORE: 2
GAD7 TOTAL SCORE: 2
7. FEELING AFRAID AS IF SOMETHING AWFUL MIGHT HAPPEN: NOT AT ALL
6. BECOMING EASILY ANNOYED OR IRRITABLE: NOT AT ALL
5. BEING SO RESTLESS THAT IT IS HARD TO SIT STILL: NOT AT ALL
2. NOT BEING ABLE TO STOP OR CONTROL WORRYING: NOT AT ALL
7. FEELING AFRAID AS IF SOMETHING AWFUL MIGHT HAPPEN: NOT AT ALL

## 2020-08-17 ASSESSMENT — PATIENT HEALTH QUESTIONNAIRE - PHQ9: SUM OF ALL RESPONSES TO PHQ QUESTIONS 1-9: 1

## 2020-08-17 NOTE — PROGRESS NOTES
"Subjective     Delicia Henry is a 39 year old female who presents to clinic today for the following health issues:    HPI       {SUPERLIST (Optional):352054}  {additonal problems for provider to add (Optional):674969}    {HIST REVIEW/ LINKS 2 (Optional):636402}    Reviewed and updated as needed this visit by Provider         Review of Systems   {ROS COMP (Optional):013040}      Objective    There were no vitals taken for this visit.  There is no height or weight on file to calculate BMI.  Physical Exam   {Exam List (Optional):919628}    {Diagnostic Test Results (Optional):126728::\"Diagnostic Test Results:\",\"Labs reviewed in Epic\"}        {PROVIDER CHARTING PREFERENCE:641069}  "

## 2020-08-18 ENCOUNTER — OFFICE VISIT (OUTPATIENT)
Dept: FAMILY MEDICINE | Facility: CLINIC | Age: 40
End: 2020-08-18
Payer: COMMERCIAL

## 2020-08-18 ENCOUNTER — MYC REFILL (OUTPATIENT)
Dept: FAMILY MEDICINE | Facility: CLINIC | Age: 40
End: 2020-08-18

## 2020-08-18 VITALS
HEIGHT: 67 IN | OXYGEN SATURATION: 99 % | TEMPERATURE: 98.5 F | DIASTOLIC BLOOD PRESSURE: 68 MMHG | BODY MASS INDEX: 23.39 KG/M2 | WEIGHT: 149 LBS | SYSTOLIC BLOOD PRESSURE: 98 MMHG

## 2020-08-18 DIAGNOSIS — Z97.5 NEXPLANON IN PLACE: ICD-10-CM

## 2020-08-18 DIAGNOSIS — F41.9 ANXIETY: ICD-10-CM

## 2020-08-18 DIAGNOSIS — Z30.017 ENCOUNTER FOR INITIAL PRESCRIPTION OF IMPLANTABLE SUBDERMAL CONTRACEPTIVE: Primary | ICD-10-CM

## 2020-08-18 DIAGNOSIS — Z30.017 NEXPLANON INSERTION: ICD-10-CM

## 2020-08-18 LAB — HCG UR QL: NEGATIVE

## 2020-08-18 PROCEDURE — 81025 URINE PREGNANCY TEST: CPT | Performed by: NURSE PRACTITIONER

## 2020-08-18 RX ORDER — LORAZEPAM 0.5 MG/1
0.5 TABLET ORAL EVERY 8 HOURS PRN
Qty: 8 TABLET | Refills: 0 | Status: SHIPPED | OUTPATIENT
Start: 2020-08-18 | End: 2020-12-15

## 2020-08-18 ASSESSMENT — MIFFLIN-ST. JEOR: SCORE: 1373.49

## 2020-08-18 ASSESSMENT — ANXIETY QUESTIONNAIRES: GAD7 TOTAL SCORE: 2

## 2020-08-18 NOTE — PROGRESS NOTES
"  Nexplanon Insertion:    Is a pregnancy test required: Yes.  Was it positive or negative?  Negative  Was a consent obtained?  Yes    Subjective: Delicia Henry is a 39 year old  presents for Nexplanon.    Patient has been given the opportunity to ask questions about all forms of birth control, including all options appropriate for Delicia Henyr. Discussed that no method of birth control, except abstinence is 100% effective against pregnancy or sexually transmitted infection.     Delicia Henry understands she may have the Nexplanon removed at any time and it should be removed by a health care provider.    The entire insertion procedure was reviewed with the patient, including care after placement.    No LMP recorded. Patient has had an injection. Has current contraception. No allergy to betadine or shellfish. Patient declines STD screening  HCG Qual Urine   Date Value Ref Range Status   2020 Negative NEG^Negative Final     Comment:     This test is for screening purposes.  Results should be interpreted along with   the clinical picture.  Confirmation testing is available if warranted by   ordering DTR019, HCG Quantitative Pregnancy.           BP 98/68   Temp 98.5  F (36.9  C)   Ht 1.702 m (5' 7\")   Wt 67.6 kg (149 lb)   SpO2 99%   BMI 23.34 kg/m      PROCEDURE NOTE: -- Nexplanon Insertion    Reason for Insertion: contraception    Patient was placed supine with left arm exposed.  Meliton was made 8-10 cm above medial epicondyle and a guiding meliton 4 cm above the first.  Arm was prepped with Betadine. Insertion point was anesthetized with 2 mL 1% lidocaine. After stretching the skin with thumb and index finger around the insertion site, skin punctured with the tip of the needle inserted at 30 degrees and then lowered to horizontal position. The needle was then advanced to its full length. Applicator was then stabilized and slider was unlocked. Slider was pulled back until " it stopped and then removed.    Correct placement of the implant was confirmed by palpation in the patient's arm and visualizing the purple top of the obturator.   Bandage and pressure dressing applied to insertion site.    Lot # [id number]  Exp: 6/25/2022    EBL: minimal    Complications: none    ASSESSMENT:     ICD-10-CM    1. Encounter for initial prescription of implantable subdermal contraceptive  Z30.017 HCG Qual, Urine ([id number])  Results for orders placed or performed in visit on 08/18/20   HCG Qual, Urine ([id number])     Status: None   Result Value Ref Range    HCG Qual Urine Negative NEG^Negative        2. Nexplanon insertion  Z30.017         PLAN:    Given 's handouts, including when to have Nexplanon removed, list of danger s/sx, side effects and follow up recommended. Encouraged condom use for prevention of STD. Back up contraception advised for 7 days. Advised to call for any fever, for prolonged or severe pain or bleeding, abnormal vaginal dischage. She was advised to use pain medications (ibuprofen) as needed for mild to moderate pain.     Crissy Rutherford, MINH CNP

## 2020-08-18 NOTE — TELEPHONE ENCOUNTER
General Call:     Who is calling:  Delicia    Reason for Call:  Delicia is calling saying [facility] doesn't have any lorazepam available and probably won't for months. She called [facility] and they have it in stock. She wants this prescription sent over there. [facility] the [facility] [address] (right across from [facility]). She is hoping this can be done today.    Okay to leave a detailed message:Yes at Cell number on file:    Telephone Information:   Mobile [phone]                  
Please see message from patient below. Please send to alternate pharmacy if appropriate. Thank you.  SARA MoscosoN, RN  [facility]    
Prescription resent to new pharmacy.  - JMeixl, CNP  
Statement Selected

## 2020-08-26 ENCOUNTER — MYC MEDICAL ADVICE (OUTPATIENT)
Dept: FAMILY MEDICINE | Facility: CLINIC | Age: 40
End: 2020-08-26

## 2020-08-27 NOTE — TELEPHONE ENCOUNTER
"Pt called back and states had the nexplanon placed on 8/18.  3 days after having procedure she had body aches and thought was getting sick but those sxs went away.  She will get pressure in her head occasionally that feels like a tension headache in the back of head, neck and go down into back.  The headache will last the rest of the day.  She also has noticed stomach cramping just like she had when she was using ocps.  Yesterday morning she walked out into her living room and had a \"head rush\" where she had to hang onto the couch for support and was not able to focus on the room.  States her vision was blurry and this lasted for about 20 seconds.  Only happened the one time.  Denies room feeling like it was spinning or tilting.  Only thing new is the implant.  Pt works out every day and eats pretty healthy.  Wondering if these could be side effects of the nexplanon and if she should continue to wait it out?    Pt can be reached at [phone] ok to leave message.    Joanna TAVERAS RN  EP Triage    "

## 2020-08-27 NOTE — TELEPHONE ENCOUNTER
Please triage patient.  Would recommend a virtual or in clinic visit for further evaluation of dizziness.  - Devante, CNP

## 2020-08-27 NOTE — TELEPHONE ENCOUNTER
Non detailed message left for pt to return call to clinic and ask to speak with a triage nurse.    Joanna TAVERAS RN  EP Triage

## 2020-09-01 ENCOUNTER — HOSPITAL ENCOUNTER (OUTPATIENT)
Dept: CARDIAC REHAB | Facility: CLINIC | Age: 40
End: 2020-09-01
Attending: NURSE PRACTITIONER
Payer: COMMERCIAL

## 2020-09-01 DIAGNOSIS — R06.00 DYSPNEA, UNSPECIFIED TYPE: ICD-10-CM

## 2020-09-01 PROCEDURE — 94060 EVALUATION OF WHEEZING: CPT

## 2020-09-01 PROCEDURE — 94729 DIFFUSING CAPACITY: CPT

## 2020-09-01 PROCEDURE — 40001038 ZZH STATISTIC RESPIRATORY TESTING VISIT

## 2020-09-01 PROCEDURE — 94726 PLETHYSMOGRAPHY LUNG VOLUMES: CPT

## 2020-09-14 ENCOUNTER — VIRTUAL VISIT (OUTPATIENT)
Dept: FAMILY MEDICINE | Facility: CLINIC | Age: 40
End: 2020-09-14
Payer: COMMERCIAL

## 2020-09-14 DIAGNOSIS — F41.9 ANXIETY: ICD-10-CM

## 2020-09-14 DIAGNOSIS — R06.00 DYSPNEA, UNSPECIFIED TYPE: Primary | ICD-10-CM

## 2020-09-14 PROCEDURE — 99214 OFFICE O/P EST MOD 30 MIN: CPT | Mod: 95 | Performed by: NURSE PRACTITIONER

## 2020-09-14 NOTE — PROGRESS NOTES
"Delicia Henry is a 39 year old female who is being evaluated via a billable telephone visit.      The patient has been notified of following:     \"This telephone visit will be conducted via a call between you and your physician/provider. We have found that certain health care needs can be provided without the need for a physical exam.  This service lets us provide the care you need with a short phone conversation.  If a prescription is necessary we can send it directly to your pharmacy.  If lab work is needed we can place an order for that and you can then stop by our lab to have the test done at a later time.    Telephone visits are billed at different rates depending on your insurance coverage. During this emergency period, for some insurers they may be billed the same as an in-person visit.  Please reach out to your insurance provider with any questions.    If during the course of the call the physician/provider feels a telephone visit is not appropriate, you will not be charged for this service.\"    Patient has given verbal consent for Telephone visit?  Yes    What phone number would you like to be contacted at? [phone]    How would you like to obtain your AVS? Shelbie    Subjective     Delicia Henry is a 39 year old female who presents via phone visit today for the following health issues:    HPI    Anxiety Follow-Up    How are you doing with your anxiety since your last visit? No change - heppens when shortness of breath is worse, had to take 2 ativan in the last week.    Are you having other symptoms that might be associated with anxiety? No    Have you had a significant life event? No     Are you feeling depressed? No    Do you have any concerns with your use of alcohol or other drugs? No  ANNA-7 SCORE 8/17/2020   Total Score 2 (minimal anxiety)   Total Score 2     PHQ 8/17/2020   PHQ-9 Total Score 1   Q9: Thoughts of better off dead/self-harm past 2 weeks Not at all       Patient " reports doing pretty well, but continues to feel odd and wonders why these breathing issues aren't better.   Was seen by pulmonary and there were no concerns.      Vision issues resolved.       Had an episode (last Friday) of allergies, post-nasal drip, itchy throat and intermittent cough.  History of spring and fall allergies.    Started taking a Claritin and above symptoms improved.     Then breathing issues started again, not able to take a deep breath. This is happening every 2-3 weeks.    Isn't able to finish a yawn.    Didn't have a great night last evening.  Took inhaler and then was able to calm breathing down.   Feels like inhaler doesn't work that well.  Ativan will typically help.      This morning was feeling ok, but isn't able to get a deep breath.      Still having shortness of breath sometimes - will come and go, some post nasal drip, cough on and off. Doesn't feel like she can take a deep breath since Thursday.    Has approximately 10 tablets of Ativan remaining.        Social History     Tobacco Use     Smoking status: Never Smoker     Smokeless tobacco: Never Used   Substance Use Topics     Alcohol use: No     Drug use: No     ANNA-7 SCORE 8/17/2020   Total Score 2 (minimal anxiety)   Total Score 2     PHQ 8/17/2020   PHQ-9 Total Score 1   Q9: Thoughts of better off dead/self-harm past 2 weeks Not at all         How many servings of fruits and vegetables do you eat daily?  4 or more    On average, how many sweetened beverages do you drink each day (Examples: soda, juice, sweet tea, etc.  Do NOT count diet or artificially sweetened beverages)?   0    How many days per week do you exercise enough to make your heart beat faster? 3 or less    How many minutes a day do you exercise enough to make your heart beat faster? 30 - 60    How many days per week do you miss taking your medication? 0        Review of Systems   Constitutional, HEENT, cardiovascular, pulmonary, gi and gu systems are negative, except  as otherwise noted.       Objective        Vitals:  No vitals were obtained today due to virtual visit.    General:  healthy, alert and no distress  PSYCH: Alert and oriented times 3; coherent speech, normal   rate and volume, able to articulate logical thoughts, able   to abstract reason, no tangential thoughts, no hallucinations   or delusions  Her affect is normal  RESP: No cough, no audible wheezing, able to talk in full sentences  Remainder of exam unable to be completed due to telephone visits            Assessment/Plan:    Delicia was seen today for shortness of breath and anxiety.    Diagnoses and all orders for this visit:    Dyspnea, unspecified type  Anxiety  Pulmonary consultation with no concerns, normal PFT's.    No noted improvement in shortness of breath with use of Albuterol inhaler, does report subjective improvement with use of Ativan as needed.   Encouraged trial of selective serotonin reuptake inhibitor (Sertraline) for anxiety - trial for 6-8 weeks and closely monitor frequency of shortness of breath episodes.    Consider CT of chest as needed, with continued or worsening episodes.    -     sertraline (ZOLOFT) 50 MG tablet; Take 1/2 tablet (25 mg) by mouth once daily for 7 days then increase to 1 tablet (50 mg) by mouth once daily    Phone call duration: 14 minutes    2:09 p.m. to 2:23 p.m.         Crissy Rutherford, MINH, CNP

## 2020-09-16 ENCOUNTER — OFFICE VISIT (OUTPATIENT)
Dept: URGENT CARE | Facility: URGENT CARE | Age: 40
End: 2020-09-16
Payer: COMMERCIAL

## 2020-09-16 VITALS
OXYGEN SATURATION: 100 % | SYSTOLIC BLOOD PRESSURE: 120 MMHG | DIASTOLIC BLOOD PRESSURE: 70 MMHG | HEART RATE: 86 BPM | TEMPERATURE: 98.3 F

## 2020-09-16 DIAGNOSIS — F41.9 ANXIETY: ICD-10-CM

## 2020-09-16 DIAGNOSIS — R06.00 DYSPNEA, UNSPECIFIED TYPE: Primary | ICD-10-CM

## 2020-09-16 PROCEDURE — 99213 OFFICE O/P EST LOW 20 MIN: CPT | Performed by: PHYSICIAN ASSISTANT

## 2020-09-16 NOTE — PROGRESS NOTES
SUBJECTIVE:   Delicia Henry is a 39 year old female presenting with a chief complaint of   Chief Complaint   Patient presents with     Urgent Care     Breathing Problem     Patient having SOB, hard to take a deep breathe, Slight Cough, Back/side pain-Sx started 2wks ago (See Chart notes, patient has been seen for this)       She is an established patient of [facility].    She is presenting to urgent care today with complaint of ongoing symptoms of sporadic shortness of breath. She reports she feels like she can't take a deep enough breath. Symptoms have been occurring off and on every 2-3 weeks since July 2020. She was seen in the ED on 7/22/2020 and 8/9/2020. Essentially all lab work/Ekg's returned negative.  CXR in July normal. Holter monitor negative. Has also seen pulmonology and PFT's normal. Covid-19 PCR and antibodies test from July resulted negative. Eventually was treated with Lorazepam at her last ED visit as it was thought these episodes were stemming from panic attacks/anxiety. She had one of those episode again this morning but it has since then resolved. She doesn't feel like she is stressed out. She runs a [employer]. Has been using Ativan very sparingly. Has tried albuterol inhaler too, doesn't help much. Was advised to trial Zoloft. She did not pick the prescription as she was very concerned about the side effects listed.  She has an appointment with Dr Savage tomorrow. She did reports 6 weeks history of illness early march (felt like she had Covid). She is wondering if her immune system has been affected since then. She denies today fever/chills, chest pain, HA, body aches,n,v,d. Reports some back pain.         Review of Systems   Constitutional: Negative for chills and fever.   HENT: Negative for sore throat.    Eyes: Negative for visual disturbance.   Respiratory: Positive for shortness of breath (now resolved).    Cardiovascular: Negative for chest pain.   Gastrointestinal:  Negative for diarrhea, nausea and vomiting.   Musculoskeletal: Positive for back pain. Negative for neck pain.   Skin: Negative for rash.   Neurological: Negative for weakness and headaches.   Psychiatric/Behavioral: Negative for confusion.       Past Medical History:   Diagnosis Date     Asthma     exercise induced asthma     MEDICAL HISTORY OF -     recurrent uti's urethral dilitation , tigonitis.     Molar pregnancy      Family History   Problem Relation Age of Onset     Allergies Father         seasonal     Allergies Sister         seasonal     Thyroid Disease Sister      Thyroid Disease Mother      Diabetes Paternal Grandfather      Current Outpatient Medications   Medication Sig Dispense Refill     albuterol (PROAIR HFA/PROVENTIL HFA/VENTOLIN HFA) 108 (90 Base) MCG/ACT inhaler INHALE 2 PUFFS INTO THE LUNGS EVERY 6 HOURS AS NEEDED FOR SHORTNESS OF BREATH OR DIFFICULT BREATHING OR WHEEZING 6.7 g 0     etonogestrel (NEXPLANON) 68 MG IMPL 1 each (68 mg) by Subdermal route continuous       LORazepam (ATIVAN) 0.5 MG tablet Take 1 tablet (0.5 mg) by mouth every 8 hours as needed for anxiety 8 tablet 0     sertraline (ZOLOFT) 50 MG tablet Take 1/2 tablet (25 mg) by mouth once daily for 7 days then increase to 1 tablet (50 mg) by mouth once daily (Patient not taking: Reported on 9/16/2020) 30 tablet 2     Social History     Tobacco Use     Smoking status: Never Smoker     Smokeless tobacco: Never Used   Substance Use Topics     Alcohol use: No       OBJECTIVE  /70   Pulse 86   Temp 98.3  F (36.8  C)   SpO2 100%   Breastfeeding No     Physical Exam  Constitutional:       General: She is not in acute distress.     Appearance: She is well-developed.   HENT:      Head: Normocephalic and atraumatic.      Right Ear: External ear normal.      Left Ear: External ear normal.      Mouth/Throat:      Mouth: Mucous membranes are moist.      Pharynx: Oropharynx is clear.   Eyes:      Conjunctiva/sclera: Conjunctivae  normal.   Neck:      Musculoskeletal: Normal range of motion.   Cardiovascular:      Rate and Rhythm: Regular rhythm.      Heart sounds: Normal heart sounds.   Pulmonary:      Effort: Pulmonary effort is normal. No respiratory distress.      Breath sounds: Normal breath sounds. No wheezing or rales.   Skin:     General: Skin is warm and dry.   Neurological:      Mental Status: She is alert and oriented to person, place, and time.   Psychiatric:         Mood and Affect: Mood normal.         Labs:  No results found for this or any previous visit (from the past 24 hour(s)).    X-Ray was not done.    ASSESSMENT:      ICD-10-CM    1. Dyspnea, unspecified type  R06.00    2. Anxiety  F41.9           PLAN:    Dyspnea, unspecified: 2 ED visits in the past 9 weeks due to acute onset dyspnea. Normal labs, EKG, Holter monitor, chest Xray, PFT's. Symptoms thought to be stemming from anxiety. Has been using lorazepam sparingly. Advised trialing Zoloft but patient very concerned about side effects. Discussed with patient today most medications unfortunately have a long list of side effects and tolerability varies from person to person. She is going to think about it. Has an appointment with Dr Savage tomorrow to further discuss options. She is discharged from urgent care in no acute distress.    Followup:    With primary care tomorrow.    Patient Instructions       Patient Education     Your Body s Response to Anxiety    Normal anxiety is part of the body s natural defense system. It's an alert to a threat that is unknown, vague, or comes from your own internal fears. While you re in this state, your feelings can range from a vague sense of worry to physical sensations such as a pounding heartbeat. These feelings make you want to react to the threat. An anxiety response is normal in many situations. But when you have an anxiety disorder, the same response can occur at the wrong times.  Anxiety can be helpful  Normal anxiety  "is a signal from your brain that warns you of a threat and is a normal response to help you prevent something or decrease the bad effects of something you can't control. For example, anxiety is a normal response to situations that might damage your body, separate you from a loved one, or lose your job. The symptoms of anxiety can be physical and mental.  How does it feel?  At certain times, people with anxiety may have:    Dizziness    Muscle tension or pain    Restlessness    Sleeplessness    Trouble concentrating    Racing heartbeat    Fast breathing    Shaking or trembling    Stomachache    Diarrhea    Loss of energy    Sweating    Cold, clammy hands    Chest pain    Dry mouth  Anxiety can also be a problem  Anxiety can become a problem when it is hard to control, occurs for months, and interferes with important parts of your life. With an anxiety disorder, your body has the response described above, but in inappropriate ways. The response a person has depends on the anxiety disorder he or she has. With some disorders, the anxiety is way out of proportion to the threat that triggers it. With others, anxiety may occur even when there isn t a clear threat or trigger.  Who does it affect?  Some people are more prone to persistent anxiety than others. It tends to run in families, and it affects more younger people than older people, and more women than men. But no age, race, or gender is immune to anxiety problems.  Anxiety can be treated  The good news is that the anxiety that s disrupting your life can be treated. Check with your healthcare provider and rule out any physical problems that may be causing the anxiety symptoms. If an anxiety disorder is diagnosed seek mental healthcare. This is an illness and it can respond to treatment. Most types of anxiety disorders will respond to \"talk therapy\" and medicines. Working with your doctor or other healthcare provider, you can develop skills to help you cope with " anxiety. You can also gain the perspective you need to overcome your fears. Note: Good sources of support or guidance can be found at your local hospital, mental health clinic, or an employee assistance program.  How to cope with anxiety  If anxiety is wearing you down, here are some things you can do to cope:    Keep in mind that you can t control everything about a situation. Change what you can and let the rest take its course.    Exercise--it s a great way to relieve tension and help your body feel relaxed.    Avoid caffeine and nicotine, which can make anxiety symptoms worse.    Fight the temptation to turn to alcohol or unprescribed drugs for relief. They only make things worse in the long run.    Educate yourself about anxiety disorders. Keep track of helpful online resources and books you can use during stressful periods.    Try stress management techniques such as meditation.    Consider online or in-person support groups.   Date Last Reviewed: 1/1/2017 2000-2019 [employer]. [address] [place] [address]. All rights reserved. This information is not intended as a substitute for professional medical care. Always follow your healthcare professional's instructions.           Patient Education     Your Body s Response to Anxiety    Normal anxiety is part of the body s natural defense system. It's an alert to a threat that is unknown, vague, or comes from your own internal fears. While you re in this state, your feelings can range from a vague sense of worry to physical sensations such as a pounding heartbeat. These feelings make you want to react to the threat. An anxiety response is normal in many situations. But when you have an anxiety disorder, the same response can occur at the wrong times.  Anxiety can be helpful  Normal anxiety is a signal from your brain that warns you of a threat and is a normal response to help you prevent something or decrease the bad effects of  "something you can't control. For example, anxiety is a normal response to situations that might damage your body, separate you from a loved one, or lose your job. The symptoms of anxiety can be physical and mental.  How does it feel?  At certain times, people with anxiety may have:    Dizziness    Muscle tension or pain    Restlessness    Sleeplessness    Trouble concentrating    Racing heartbeat    Fast breathing    Shaking or trembling    Stomachache    Diarrhea    Loss of energy    Sweating    Cold, clammy hands    Chest pain    Dry mouth  Anxiety can also be a problem  Anxiety can become a problem when it is hard to control, occurs for months, and interferes with important parts of your life. With an anxiety disorder, your body has the response described above, but in inappropriate ways. The response a person has depends on the anxiety disorder he or she has. With some disorders, the anxiety is way out of proportion to the threat that triggers it. With others, anxiety may occur even when there isn t a clear threat or trigger.  Who does it affect?  Some people are more prone to persistent anxiety than others. It tends to run in families, and it affects more younger people than older people, and more women than men. But no age, race, or gender is immune to anxiety problems.  Anxiety can be treated  The good news is that the anxiety that s disrupting your life can be treated. Check with your healthcare provider and rule out any physical problems that may be causing the anxiety symptoms. If an anxiety disorder is diagnosed seek mental healthcare. This is an illness and it can respond to treatment. Most types of anxiety disorders will respond to \"talk therapy\" and medicines. Working with your doctor or other healthcare provider, you can develop skills to help you cope with anxiety. You can also gain the perspective you need to overcome your fears. Note: Good sources of support or guidance can be found at your local " hospital, mental health clinic, or an employee assistance program.  How to cope with anxiety  If anxiety is wearing you down, here are some things you can do to cope:    Keep in mind that you can t control everything about a situation. Change what you can and let the rest take its course.    Exercise--it s a great way to relieve tension and help your body feel relaxed.    Avoid caffeine and nicotine, which can make anxiety symptoms worse.    Fight the temptation to turn to alcohol or unprescribed drugs for relief. They only make things worse in the long run.    Educate yourself about anxiety disorders. Keep track of helpful online resources and books you can use during stressful periods.    Try stress management techniques such as meditation.    Consider online or in-person support groups.   Date Last Reviewed: 1/1/2017 2000-2019 [employer]. [address] [place] [address], [place] [address]. All rights reserved. This information is not intended as a substitute for professional medical care. Always follow your healthcare professional's instructions.

## 2020-09-17 ENCOUNTER — VIRTUAL VISIT (OUTPATIENT)
Dept: FAMILY MEDICINE | Facility: CLINIC | Age: 40
End: 2020-09-17
Payer: COMMERCIAL

## 2020-09-17 DIAGNOSIS — F41.0 ANXIETY ATTACK: ICD-10-CM

## 2020-09-17 DIAGNOSIS — R06.02 SHORTNESS OF BREATH: Primary | ICD-10-CM

## 2020-09-17 DIAGNOSIS — F41.0 PANIC DISORDER WITHOUT AGORAPHOBIA: ICD-10-CM

## 2020-09-17 DIAGNOSIS — G93.31 POSTVIRAL FATIGUE SYNDROME: ICD-10-CM

## 2020-09-17 PROCEDURE — 99214 OFFICE O/P EST MOD 30 MIN: CPT | Mod: 95 | Performed by: FAMILY MEDICINE

## 2020-09-17 ASSESSMENT — ENCOUNTER SYMPTOMS
FEVER: 0
CHILLS: 0
VOMITING: 0
SHORTNESS OF BREATH: 1
NAUSEA: 0
BACK PAIN: 1
HEADACHES: 0
CONFUSION: 0
SORE THROAT: 0
WEAKNESS: 0
DIARRHEA: 0
NECK PAIN: 0

## 2020-09-17 NOTE — PATIENT INSTRUCTIONS
Patient Education     Your Body s Response to Anxiety    Normal anxiety is part of the body s natural defense system. It's an alert to a threat that is unknown, vague, or comes from your own internal fears. While you re in this state, your feelings can range from a vague sense of worry to physical sensations such as a pounding heartbeat. These feelings make you want to react to the threat. An anxiety response is normal in many situations. But when you have an anxiety disorder, the same response can occur at the wrong times.  Anxiety can be helpful  Normal anxiety is a signal from your brain that warns you of a threat and is a normal response to help you prevent something or decrease the bad effects of something you can't control. For example, anxiety is a normal response to situations that might damage your body, separate you from a loved one, or lose your job. The symptoms of anxiety can be physical and mental.  How does it feel?  At certain times, people with anxiety may have:    Dizziness    Muscle tension or pain    Restlessness    Sleeplessness    Trouble concentrating    Racing heartbeat    Fast breathing    Shaking or trembling    Stomachache    Diarrhea    Loss of energy    Sweating    Cold, clammy hands    Chest pain    Dry mouth  Anxiety can also be a problem  Anxiety can become a problem when it is hard to control, occurs for months, and interferes with important parts of your life. With an anxiety disorder, your body has the response described above, but in inappropriate ways. The response a person has depends on the anxiety disorder he or she has. With some disorders, the anxiety is way out of proportion to the threat that triggers it. With others, anxiety may occur even when there isn t a clear threat or trigger.  Who does it affect?  Some people are more prone to persistent anxiety than others. It tends to run in families, and it affects more younger people than older people, and more women than  "men. But no age, race, or gender is immune to anxiety problems.  Anxiety can be treated  The good news is that the anxiety that s disrupting your life can be treated. Check with your healthcare provider and rule out any physical problems that may be causing the anxiety symptoms. If an anxiety disorder is diagnosed seek mental healthcare. This is an illness and it can respond to treatment. Most types of anxiety disorders will respond to \"talk therapy\" and medicines. Working with your doctor or other healthcare provider, you can develop skills to help you cope with anxiety. You can also gain the perspective you need to overcome your fears. Note: Good sources of support or guidance can be found at your local hospital, mental health clinic, or an employee assistance program.  How to cope with anxiety  If anxiety is wearing you down, here are some things you can do to cope:    Keep in mind that you can t control everything about a situation. Change what you can and let the rest take its course.    Exercise--it s a great way to relieve tension and help your body feel relaxed.    Avoid caffeine and nicotine, which can make anxiety symptoms worse.    Fight the temptation to turn to alcohol or unprescribed drugs for relief. They only make things worse in the long run.    Educate yourself about anxiety disorders. Keep track of helpful online resources and books you can use during stressful periods.    Try stress management techniques such as meditation.    Consider online or in-person support groups.   Date Last Reviewed: 1/1/2017 2000-2019 [employer]. [address] [place] [address], [place] [address]. All rights reserved. This information is not intended as a substitute for professional medical care. Always follow your healthcare professional's instructions.           Patient Education     Your Body s Response to Anxiety    Normal anxiety is part of the body s natural defense system. It's an alert to a threat that " is unknown, vague, or comes from your own internal fears. While you re in this state, your feelings can range from a vague sense of worry to physical sensations such as a pounding heartbeat. These feelings make you want to react to the threat. An anxiety response is normal in many situations. But when you have an anxiety disorder, the same response can occur at the wrong times.  Anxiety can be helpful  Normal anxiety is a signal from your brain that warns you of a threat and is a normal response to help you prevent something or decrease the bad effects of something you can't control. For example, anxiety is a normal response to situations that might damage your body, separate you from a loved one, or lose your job. The symptoms of anxiety can be physical and mental.  How does it feel?  At certain times, people with anxiety may have:    Dizziness    Muscle tension or pain    Restlessness    Sleeplessness    Trouble concentrating    Racing heartbeat    Fast breathing    Shaking or trembling    Stomachache    Diarrhea    Loss of energy    Sweating    Cold, clammy hands    Chest pain    Dry mouth  Anxiety can also be a problem  Anxiety can become a problem when it is hard to control, occurs for months, and interferes with important parts of your life. With an anxiety disorder, your body has the response described above, but in inappropriate ways. The response a person has depends on the anxiety disorder he or she has. With some disorders, the anxiety is way out of proportion to the threat that triggers it. With others, anxiety may occur even when there isn t a clear threat or trigger.  Who does it affect?  Some people are more prone to persistent anxiety than others. It tends to run in families, and it affects more younger people than older people, and more women than men. But no age, race, or gender is immune to anxiety problems.  Anxiety can be treated  The good news is that the anxiety that s disrupting your life can  "be treated. Check with your healthcare provider and rule out any physical problems that may be causing the anxiety symptoms. If an anxiety disorder is diagnosed seek mental healthcare. This is an illness and it can respond to treatment. Most types of anxiety disorders will respond to \"talk therapy\" and medicines. Working with your doctor or other healthcare provider, you can develop skills to help you cope with anxiety. You can also gain the perspective you need to overcome your fears. Note: Good sources of support or guidance can be found at your local hospital, mental health clinic, or an employee assistance program.  How to cope with anxiety  If anxiety is wearing you down, here are some things you can do to cope:    Keep in mind that you can t control everything about a situation. Change what you can and let the rest take its course.    Exercise--it s a great way to relieve tension and help your body feel relaxed.    Avoid caffeine and nicotine, which can make anxiety symptoms worse.    Fight the temptation to turn to alcohol or unprescribed drugs for relief. They only make things worse in the long run.    Educate yourself about anxiety disorders. Keep track of helpful online resources and books you can use during stressful periods.    Try stress management techniques such as meditation.    Consider online or in-person support groups.   Date Last Reviewed: 1/1/2017 2000-2019 [employer]. [address] [place] [address], [place] [address]. All rights reserved. This information is not intended as a substitute for professional medical care. Always follow your healthcare professional's instructions.           "

## 2020-09-17 NOTE — PROGRESS NOTES
"Delicia Henry is a 39 year old female who is being evaluated via a billable video visit, secondary to COVID-19 coronavirus pandemic, as we are trying to minimize patient exposure to the virus,  which is now widespread in the state.      The patient has been notified of following:     \"This video visit will be conducted via a call between you and your physician/provider. We have found that certain health care needs can be provided without the need for an in-person physical exam.  This service lets us provide the care you need with a video conversation.  If a prescription is necessary we can send it directly to your pharmacy.  If lab work is needed we can place an order for that and you can then stop by our lab to have the test done at a later time.    Video visits are billed at different rates depending on your insurance coverage.  Please reach out to your insurance provider with any questions.    If during the course of the call the physician/provider feels a video visit is not appropriate, you will not be charged for this service.\"    Patient has given verbal consent for Video visit? Yes  How would you like to obtain your AVS? MyChart  If you are dropped from the video visit, the video invite should be resent to: Text to cell phone: [phone]  Will anyone else be joining your video visit? No    Subjective     Delicia Henry is a 39 year old female who presents today via video visit for the following health issues:    HPI    Concern - SOB Breathing issues  Onset: March  Description:  SOB  Intensity: moderate  Progression of Symptoms:  same  Accompanying Signs & Symptoms: Back pain  Previous history of similar problem: Since March  Precipitating factors:        Worsened by: none  Alleviating factors:        Improved by: Lorazepam relaxing  Therapies tried and outcome:  none     When the COVID-19 novel coronavirus pandemic [place] shutdown- started 3/16 - started to have some constant shortness of " "breath with intermittent then panic-like shortness of breath episodes.sometimes felt like she was breathing glass.   Never sense of taste or smell.  Had ncreased headaches, was really exhausted, no fever. Had tingling in her hands and feet, never a cough, no focal weakness. Went to ER - had chest xray  Lasted for 4-5 weeks.    After the 4th of July, started to get a sore throat and feeling achey all over - last for 3-4 days,then started to get shortness of breath again . Went to ER 7/19/2020 , then had a heart monitor for 5 days = normal.   2 weeks later felt fine.  Since then intermittently gets shortness of breath , panic feeling, and tingling in her arms and legs and exhausted/wiped out.  Went to ER 7/19/2020 and oxygen sats were 100%.  Started on Ativan prn.  Didn't take care of all her symptoms - still felt like she couldn't take a really deep breath, but could function.      No hx of anxiety or depression in the past,but now feels really stressed out secondary to her above symptoms.      Had nexplanon placed on 8/18/2020, then felt great for 2 weeks, then started with sore throat and nasal congestion - thought that was allergies, then went to chest again with some intermittent wheezing - mild cough.  No fever - couldn't take a deep breath.  Went to  yesterday am as her back felt it was \"on fire\" between her shoulder blades. When she would take a deep breath, it hurt down into her sides.  Crissy Rutherford , put her on sertraline, but the black box warnings scared off.     Almost paralyzing recurrences where she can't breath and can barely move x 4 since the above.     Today, she woke up and felt great and went to morning classes at her family's [employer] and felt great.  On and off whenever her kids are sick since 4/2020 ,  she feels like her breathing gets bad as well for 1-2 days.      Never had any joint pain.   No dyspnea on exertion.  Has been doing kickboxing workouts in between her excacerbations " without difficulty.      Video Start Time: 2:11 PM    Patient Active Problem List   Diagnosis     Cleft palate     Congenital fusion of kidneys     Nexplanon in place       Current Outpatient Medications   Medication Sig Dispense Refill     etonogestrel (NEXPLANON) 68 MG IMPL 1 each (68 mg) by Subdermal route continuous       albuterol (PROAIR HFA/PROVENTIL HFA/VENTOLIN HFA) 108 (90 Base) MCG/ACT inhaler INHALE 2 PUFFS INTO THE LUNGS EVERY 6 HOURS AS NEEDED FOR SHORTNESS OF BREATH OR DIFFICULT BREATHING OR WHEEZING 6.7 g 0     LORazepam (ATIVAN) 0.5 MG tablet Take 1 tablet (0.5 mg) by mouth every 8 hours as needed for anxiety 8 tablet 0     sertraline (ZOLOFT) 50 MG tablet Take 1/2 tablet (25 mg) by mouth once daily for 7 days then increase to 1 tablet (50 mg) by mouth once daily (Patient not taking: Reported on 9/16/2020) 30 tablet 2          Allergies   Allergen Reactions     Avelox Swelling     Swelling tongue, dizzy, itching     Erythromycin Nausea     Sulfa Drugs      Unknown reaction            Review of Systems   Constitutional, HEENT, cardiovascular, pulmonary, gi and gu systems are negative, except as otherwise noted.      Objective           Vitals:  No vitals were obtained today due to virtual visit.    Physical Exam     GENERAL: Healthy, alert and no distress  EYES: Eyes grossly normal to inspection.  No discharge or erythema, or obvious scleral/conjunctival abnormalities.  RESP: No audible wheeze, cough, or visible cyanosis.  No visible retractions or increased work of breathing.    SKIN: Visible skin clear. No significant rash, abnormal pigmentation or lesions.  NEURO: Cranial nerves grossly intact.  Mentation and speech appropriate for age.  PSYCH: Mentation appears normal, affect normal/bright, judgement and insight intact, normal speech and appearance well-groomed.      No results found for any visits on 09/17/20.        ASSESSMENT: PLAN:    ICD-10-CM    1. Shortness of breath- intermittent, but  debilitating s/p viral illness mid March through Mid April 2020 - tested Covid Abby negative   R06.02 CT Chest w/o Contrast     Echocardiogram Complete     Protein electrophoresis     Immunoglobulins A G and M     CMV Antibody IgG     Lyme Disease Abby with reflex to WB Serum     CBC with platelets and differential     CANCELED: Protein electrophoresis     CANCELED: Immunoglobulins A G and M     CANCELED: CMV Antibody IgG     CANCELED: Lyme Disease Abby with reflex to WB Serum     CANCELED: CBC with platelets differential   2. Postviral fatigue syndrome- intermittent   G93.3    3. Panic disorder without agoraphobia  F41.0    4. Anxiety attacks  F41.0       Start the sertraline that Crissy Rutherford rx'd on 9/14/2020 - discussed selective serotonin reuptake inhibitor mech of action and that I feel the benefits outway the risks.  Consider therapy/counseling  as well.   recheck lab only in the next week and f/u video visit with me in 1 month.   Please, call or return to clinic or go to the ER immediately if signs or symptoms worsen or fail to improve as anticipated.   Consider PTSD as cause of ongoing symptoms from her bad viral illness in 3/-4/2020. - put info  AVS . --discussed with pt in detail.     Video-Visit Details    Type of service:  Video Visit    Video start time : 2:11pm   Video End Time:2:44 PM    Originating Location (pt. Location): Home    Distant Location (provider location):  [facility]     Platform used for Video Visit: Dwain

## 2020-09-19 LAB
DLCOUNC-%PRED-PRE: 106 %
DLCOUNC-PRE: 25.53 ML/MIN/MMHG
DLCOUNC-PRED: 24.07 ML/MIN/MMHG
ERV-%PRED-PRE: 89 %
ERV-PRE: 1.08 L
ERV-PRED: 1.2 L
EXPTIME-PRE: 6.21 SEC
FEF2575-%PRED-POST: 77 %
FEF2575-%PRED-PRE: 68 %
FEF2575-POST: 2.59 L/SEC
FEF2575-PRE: 2.3 L/SEC
FEF2575-PRED: 3.35 L/SEC
FEFMAX-%PRED-PRE: 90 %
FEFMAX-PRE: 6.74 L/SEC
FEFMAX-PRED: 7.43 L/SEC
FEV1-%PRED-PRE: 94 %
FEV1-PRE: 3.11 L
FEV1FEV6-PRE: 72 %
FEV1FEV6-PRED: 84 %
FEV1FVC-PRE: 72 %
FEV1FVC-PRED: 82 %
FEV1SVC-PRE: 74 %
FEV1SVC-PRED: 81 %
FIFMAX-PRE: 7.78 L/SEC
FRCPLETH-%PRED-PRE: 95 %
FRCPLETH-PRE: 2.72 L
FRCPLETH-PRED: 2.85 L
FVC-%PRED-PRE: 106 %
FVC-PRE: 4.33 L
FVC-PRED: 4.06 L
IC-%PRED-PRE: 110 %
IC-PRE: 3.14 L
IC-PRED: 2.85 L
RVPLETH-%PRED-PRE: 94 %
RVPLETH-PRE: 1.64 L
RVPLETH-PRED: 1.74 L
TLCPLETH-%PRED-PRE: 107 %
TLCPLETH-PRE: 5.86 L
TLCPLETH-PRED: 5.44 L
VA-%PRED-PRE: 112 %
VA-PRE: 6.24 L
VC-%PRED-PRE: 104 %
VC-PRE: 4.23 L
VC-PRED: 4.05 L

## 2020-09-21 NOTE — RESULT ENCOUNTER NOTE
Khanh Nesbitt,     Your final PFT's (pulmonary function test) results are available.      FEV1, FVC and ratio is within normal limits.  The inspiratory flow rates are within normal limits.  Lung volumes are within normal limits.  Following administration of bronchodilators, there is no significant response. The diffusing capacity is normal.   However, the diffusing capacity was not corrected for the patient's hemoglobin.     IMPRESSION:   Normal PFTS     Please send a [employer] message or call [phone]  if you have any questions.      MINH Lockwood, [facility]    If you have further questions about the interpretation of your labs, [email or web address] is a good website to check out for further information.

## 2020-09-23 DIAGNOSIS — R06.02 SHORTNESS OF BREATH: ICD-10-CM

## 2020-09-23 LAB
BASOPHILS # BLD AUTO: 0.1 10E9/L (ref 0–0.2)
BASOPHILS NFR BLD AUTO: 1.1 %
DIFFERENTIAL METHOD BLD: NORMAL
EOSINOPHIL # BLD AUTO: 0.2 10E9/L (ref 0–0.7)
EOSINOPHIL NFR BLD AUTO: 2.4 %
ERYTHROCYTE [DISTWIDTH] IN BLOOD BY AUTOMATED COUNT: 11.8 % (ref 10–15)
HCT VFR BLD AUTO: 40 % (ref 35–47)
HGB BLD-MCNC: 13.4 G/DL (ref 11.7–15.7)
LYMPHOCYTES # BLD AUTO: 2 10E9/L (ref 0.8–5.3)
LYMPHOCYTES NFR BLD AUTO: 28.4 %
MCH RBC QN AUTO: 30.7 PG (ref 26.5–33)
MCHC RBC AUTO-ENTMCNC: 33.5 G/DL (ref 31.5–36.5)
MCV RBC AUTO: 92 FL (ref 78–100)
MONOCYTES # BLD AUTO: 0.4 10E9/L (ref 0–1.3)
MONOCYTES NFR BLD AUTO: 5.6 %
NEUTROPHILS # BLD AUTO: 4.4 10E9/L (ref 1.6–8.3)
NEUTROPHILS NFR BLD AUTO: 62.5 %
PLATELET # BLD AUTO: 295 10E9/L (ref 150–450)
RBC # BLD AUTO: 4.36 10E12/L (ref 3.8–5.2)
WBC # BLD AUTO: 7.1 10E9/L (ref 4–11)

## 2020-09-23 PROCEDURE — 00000402 ZZHCL STATISTIC TOTAL PROTEIN: Performed by: FAMILY MEDICINE

## 2020-09-23 PROCEDURE — 84165 PROTEIN E-PHORESIS SERUM: CPT | Performed by: FAMILY MEDICINE

## 2020-09-23 PROCEDURE — 86618 LYME DISEASE ANTIBODY: CPT | Performed by: FAMILY MEDICINE

## 2020-09-23 PROCEDURE — 36415 COLL VENOUS BLD VENIPUNCTURE: CPT | Performed by: FAMILY MEDICINE

## 2020-09-23 PROCEDURE — 86644 CMV ANTIBODY: CPT | Performed by: FAMILY MEDICINE

## 2020-09-23 PROCEDURE — 85025 COMPLETE CBC W/AUTO DIFF WBC: CPT | Performed by: FAMILY MEDICINE

## 2020-09-23 PROCEDURE — 82784 ASSAY IGA/IGD/IGG/IGM EACH: CPT | Performed by: FAMILY MEDICINE

## 2020-09-23 PROCEDURE — 86769 SARS-COV-2 COVID-19 ANTIBODY: CPT | Performed by: NURSE PRACTITIONER

## 2020-09-24 LAB
ALBUMIN SERPL ELPH-MCNC: 4.5 G/DL (ref 3.7–5.1)
ALPHA1 GLOB SERPL ELPH-MCNC: 0.3 G/DL (ref 0.2–0.4)
ALPHA2 GLOB SERPL ELPH-MCNC: 0.7 G/DL (ref 0.5–0.9)
B BURGDOR IGG+IGM SER QL: 0.3 (ref 0–0.89)
B-GLOBULIN SERPL ELPH-MCNC: 0.8 G/DL (ref 0.6–1)
CMV IGG SERPL QL IA: >8 AI (ref 0–0.8)
COVID-19 SPIKE RBD ABY TITER: NORMAL
COVID-19 SPIKE RBD ABY: NEGATIVE
GAMMA GLOB SERPL ELPH-MCNC: 1 G/DL (ref 0.7–1.6)
IGA SERPL-MCNC: 116 MG/DL (ref 84–499)
IGG SERPL-MCNC: 1099 MG/DL (ref 610–1616)
IGM SERPL-MCNC: 47 MG/DL (ref 35–242)
M PROTEIN SERPL ELPH-MCNC: 0 G/DL
PROT PATTERN SERPL ELPH-IMP: NORMAL

## 2020-09-25 NOTE — RESULT ENCOUNTER NOTE
GEORGINA Navarro antibody test is negative.        Please send a Blogger[employer] message or call [phone]  if you have any questions.      Crissy Rutherford, MINH, CNP  [facility]    If you have further questions about the interpretation of your labs, [email or web address] is a good website to check out for further information.

## 2020-09-28 ENCOUNTER — HOSPITAL ENCOUNTER (OUTPATIENT)
Dept: CARDIOLOGY | Facility: CLINIC | Age: 40
Discharge: HOME | End: 2020-09-28
Attending: FAMILY MEDICINE | Admitting: FAMILY MEDICINE
Payer: COMMERCIAL

## 2020-09-28 DIAGNOSIS — R06.02 SHORTNESS OF BREATH: ICD-10-CM

## 2020-09-28 PROCEDURE — 93306 TTE W/DOPPLER COMPLETE: CPT | Mod: 26 | Performed by: INTERNAL MEDICINE

## 2020-09-28 PROCEDURE — 93306 TTE W/DOPPLER COMPLETE: CPT

## 2020-10-07 ENCOUNTER — HOSPITAL ENCOUNTER (OUTPATIENT)
Dept: CT IMAGING | Facility: CLINIC | Age: 40
Discharge: HOME | End: 2020-10-07
Attending: FAMILY MEDICINE | Admitting: FAMILY MEDICINE
Payer: COMMERCIAL

## 2020-10-07 DIAGNOSIS — R06.02 SHORTNESS OF BREATH: ICD-10-CM

## 2020-10-07 PROCEDURE — 71250 CT THORAX DX C-: CPT

## 2020-11-20 ENCOUNTER — MYC MEDICAL ADVICE (OUTPATIENT)
Dept: FAMILY MEDICINE | Facility: CLINIC | Age: 40
End: 2020-11-20

## 2020-11-24 ASSESSMENT — ASTHMA QUESTIONNAIRES: ACT_TOTALSCORE: 20

## 2020-12-09 ENCOUNTER — HOSPITAL ENCOUNTER (EMERGENCY)
Facility: CLINIC | Age: 40
Discharge: HOME | End: 2020-12-09
Attending: EMERGENCY MEDICINE | Admitting: EMERGENCY MEDICINE
Payer: COMMERCIAL

## 2020-12-09 ENCOUNTER — APPOINTMENT (OUTPATIENT)
Dept: CARDIOLOGY | Facility: CLINIC | Age: 40
End: 2020-12-09
Attending: EMERGENCY MEDICINE
Payer: COMMERCIAL

## 2020-12-09 ENCOUNTER — APPOINTMENT (OUTPATIENT)
Dept: GENERAL RADIOLOGY | Facility: CLINIC | Age: 40
End: 2020-12-09
Attending: EMERGENCY MEDICINE
Payer: COMMERCIAL

## 2020-12-09 ENCOUNTER — NURSE TRIAGE (OUTPATIENT)
Dept: NURSING | Facility: CLINIC | Age: 40
End: 2020-12-09

## 2020-12-09 VITALS
RESPIRATION RATE: 15 BRPM | DIASTOLIC BLOOD PRESSURE: 80 MMHG | OXYGEN SATURATION: 100 % | HEART RATE: 85 BPM | SYSTOLIC BLOOD PRESSURE: 122 MMHG | TEMPERATURE: 98 F

## 2020-12-09 DIAGNOSIS — R00.2 PALPITATIONS: ICD-10-CM

## 2020-12-09 DIAGNOSIS — R07.89 ATYPICAL CHEST PAIN: ICD-10-CM

## 2020-12-09 LAB
ANION GAP SERPL CALCULATED.3IONS-SCNC: 2 MMOL/L (ref 3–14)
B-HCG FREE SERPL-ACNC: <5 IU/L
BASOPHILS # BLD AUTO: 0.1 10E9/L (ref 0–0.2)
BASOPHILS NFR BLD AUTO: 1 %
BUN SERPL-MCNC: 12 MG/DL (ref 7–30)
CALCIUM SERPL-MCNC: 8.9 MG/DL (ref 8.5–10.1)
CHLORIDE SERPL-SCNC: 110 MMOL/L (ref 94–109)
CO2 SERPL-SCNC: 28 MMOL/L (ref 20–32)
CREAT SERPL-MCNC: 1 MG/DL (ref 0.52–1.04)
D DIMER PPP FEU-MCNC: 0.3 UG/ML FEU (ref 0–0.5)
DIFFERENTIAL METHOD BLD: NORMAL
EOSINOPHIL # BLD AUTO: 0.1 10E9/L (ref 0–0.7)
EOSINOPHIL NFR BLD AUTO: 1.1 %
ERYTHROCYTE [DISTWIDTH] IN BLOOD BY AUTOMATED COUNT: 11.5 % (ref 10–15)
GFR SERPL CREATININE-BSD FRML MDRD: 69 ML/MIN/{1.73_M2}
GLUCOSE SERPL-MCNC: 89 MG/DL (ref 70–99)
HCT VFR BLD AUTO: 42.1 % (ref 35–47)
HGB BLD-MCNC: 14.1 G/DL (ref 11.7–15.7)
IMM GRANULOCYTES # BLD: 0 10E9/L (ref 0–0.4)
IMM GRANULOCYTES NFR BLD: 0.1 %
INTERPRETATION ECG - MUSE: NORMAL
LYMPHOCYTES # BLD AUTO: 1.1 10E9/L (ref 0.8–5.3)
LYMPHOCYTES NFR BLD AUTO: 15.5 %
MAGNESIUM SERPL-MCNC: 2.4 MG/DL (ref 1.6–2.3)
MCH RBC QN AUTO: 31.3 PG (ref 26.5–33)
MCHC RBC AUTO-ENTMCNC: 33.5 G/DL (ref 31.5–36.5)
MCV RBC AUTO: 94 FL (ref 78–100)
MONOCYTES # BLD AUTO: 0.4 10E9/L (ref 0–1.3)
MONOCYTES NFR BLD AUTO: 5.6 %
NEUTROPHILS # BLD AUTO: 5.5 10E9/L (ref 1.6–8.3)
NEUTROPHILS NFR BLD AUTO: 76.7 %
NRBC # BLD AUTO: 0 10*3/UL
NRBC BLD AUTO-RTO: 0 /100
PLATELET # BLD AUTO: 286 10E9/L (ref 150–450)
POTASSIUM SERPL-SCNC: 4 MMOL/L (ref 3.4–5.3)
RBC # BLD AUTO: 4.5 10E12/L (ref 3.8–5.2)
SODIUM SERPL-SCNC: 140 MMOL/L (ref 133–144)
TROPONIN I SERPL-MCNC: <0.015 UG/L (ref 0–0.04)
TSH SERPL DL<=0.005 MIU/L-ACNC: 1.6 MU/L (ref 0.4–4)
WBC # BLD AUTO: 7.2 10E9/L (ref 4–11)

## 2020-12-09 PROCEDURE — 0298T LEADLESS EKG MONITOR 3 TO 14 DAYS: CPT | Performed by: INTERNAL MEDICINE

## 2020-12-09 PROCEDURE — 84443 ASSAY THYROID STIM HORMONE: CPT | Performed by: EMERGENCY MEDICINE

## 2020-12-09 PROCEDURE — 0296T LEADLESS EKG MONITOR 3 TO 14 DAYS: CPT

## 2020-12-09 PROCEDURE — 71046 X-RAY EXAM CHEST 2 VIEWS: CPT

## 2020-12-09 PROCEDURE — 84484 ASSAY OF TROPONIN QUANT: CPT | Performed by: EMERGENCY MEDICINE

## 2020-12-09 PROCEDURE — 83735 ASSAY OF MAGNESIUM: CPT | Performed by: EMERGENCY MEDICINE

## 2020-12-09 PROCEDURE — 99285 EMERGENCY DEPT VISIT HI MDM: CPT | Mod: 25

## 2020-12-09 PROCEDURE — 84702 CHORIONIC GONADOTROPIN TEST: CPT

## 2020-12-09 PROCEDURE — 258N000003 HC RX IP 258 OP 636: Performed by: EMERGENCY MEDICINE

## 2020-12-09 PROCEDURE — 85379 FIBRIN DEGRADATION QUANT: CPT | Performed by: EMERGENCY MEDICINE

## 2020-12-09 PROCEDURE — 80048 BASIC METABOLIC PNL TOTAL CA: CPT | Performed by: EMERGENCY MEDICINE

## 2020-12-09 PROCEDURE — 96360 HYDRATION IV INFUSION INIT: CPT

## 2020-12-09 PROCEDURE — 93005 ELECTROCARDIOGRAM TRACING: CPT | Mod: 59

## 2020-12-09 PROCEDURE — 85025 COMPLETE CBC W/AUTO DIFF WBC: CPT | Performed by: EMERGENCY MEDICINE

## 2020-12-09 RX ORDER — SODIUM CHLORIDE 9 MG/ML
INJECTION, SOLUTION INTRAVENOUS CONTINUOUS
Status: DISCONTINUED | OUTPATIENT
Start: 2020-12-09 | End: 2020-12-09 | Stop reason: HOSPADM

## 2020-12-09 RX ADMIN — SODIUM CHLORIDE 1000 ML: 9 INJECTION, SOLUTION INTRAVENOUS at 13:29

## 2020-12-09 ASSESSMENT — ENCOUNTER SYMPTOMS
PALPITATIONS: 1
DIZZINESS: 0
FEVER: 0
SORE THROAT: 0
SHORTNESS OF BREATH: 0
COUGH: 0
CHILLS: 0

## 2020-12-09 NOTE — ED TRIAGE NOTES
Pt presents with palpations. Started 2 weeks ago. States that they start around 7 PM and they keep her up at night and they just haven't let up. Reports decreased alcohol and caffeine intake. Discomfort in her upper chest.

## 2020-12-09 NOTE — ED AVS SNAPSHOT
[facility] Emergency Dept  [address]  [facility] [address]  Phone: [phone]  Fax: [phone]                                    Delicia Henry   MRN: [id number]    Department: [facility] Emergency Dept   Date of Visit: 12/9/2020           After Visit Summary Signature Page    I have received my discharge instructions, and my questions have been answered. I have discussed any challenges I see with this plan with the nurse or doctor.    ..........................................................................................................................................  Patient/Patient Representative Signature      ..........................................................................................................................................  Patient Representative Print Name and Relationship to Patient    ..................................................               ................................................  Date                                   Time    ..........................................................................................................................................  Reviewed by Signature/Title    ...................................................              ..............................................  Date                                               Time          22EPIC Rev 08/18

## 2020-12-09 NOTE — TELEPHONE ENCOUNTER
"Patient calling reporting \"Heart palpitations every single night x 2 weeks.\" Reporting symptoms start at 7 pm and last until midnight. Patient reporting heart rate ranging to 104 beats per minute on apple watch. Reporting new onset of chest pain in left upper shoulder collarbone area described as \"stabbing.\"  Pain will come and go. Denies change with movement or position change.     Patient is en route to [facility] ED.    COVID 19 Nurse Triage Plan/Patient Instructions    Please be aware that novel coronavirus (COVID-19) may be circulating in the community. If you develop symptoms such as fever, cough, or SOB or if you have concerns about the presence of another infection including coronavirus (COVID-19), please contact your health care provider or visit [email or web address].     Disposition/Instructions    ED Visit recommended. Follow protocol based instructions.     Bring Your Own Device:  Please also bring your smart device(s) (smart phones, tablets, laptops) and their charging cables for your personal use and to communicate with your care team during your visit.    Thank you for taking steps to prevent the spread of this virus.  o Limit your contact with others.  o Wear a simple mask to cover your cough.  o Wash your hands well and often.    Resources    [facility]: About COVID-19: [email or web address]    CDC: What to Do If You're Sick: www.cdc.gov/coronavirus/2019-ncov/about/steps-when-sick.html    CDC: Ending Home Isolation: www.cdc.gov/coronavirus/2019-ncov/hcp/disposition-in-home-patients.html     CDC: Caring for Someone: www.cdc.gov/coronavirus/2019-ncov/if-you-are-sick/care-for-someone.html     [facility]: Interim Guidance for Hospital Discharge to Home: www.health.[facility].[place]/diseases/coronavirus/hcp/hospdischarge.pdf    [facility] clinical trials (COVID-19 research studies): [email or web address]/umn-clinical-trials     Below are the COVID-19 hotlines at the [place] " Department of Health ([facility]). Interpreters are available.   o For health questions: Call 856-251-9686 or 1-326.900.6551 (7 a.m. to 7 p.m.)  o For questions about schools and childcare: Call 204-008-2816 or 1-985.104.1066 (7 a.m. to 7 p.m.)                     Reason for Disposition    Pain also present in shoulder(s) or arm(s) or jaw    Additional Information    Negative: Passed out (i.e., fainted, collapsed and was not responding)    Negative: Shock suspected (e.g., cold/pale/clammy skin, too weak to stand, low BP, rapid pulse)    Negative: Difficult to awaken or acting confused (e.g., disoriented, slurred speech)    Negative: Visible sweat on face or sweat dripping down face    Negative: Unable to walk, or can only walk with assistance (e.g., requires support)    Negative: Received SHOCK from implantable cardiac defibrillator and has persisting symptoms (i.e., palpitations, lightheadedness)    Negative: Sounds like a life-threatening emergency to the triager    Chest pain    Negative: Severe difficulty breathing (e.g., struggling for each breath, speaks in single words)    Negative: Passed out (i.e., fainted, collapsed and was not responding)    Negative: Chest pain lasting longer than 5 minutes and ANY of the following:* Over 50 years old* Over 30 years old and at least one cardiac risk factor (i.e., high blood pressure, diabetes, high cholesterol, obesity, smoker or strong family history of heart disease)* Pain is crushing, pressure-like, or heavy * Took nitroglycerin and chest pain was not relieved* History of heart disease (i.e., angina, heart attack, bypass surgery, angioplasty, CHF)    Negative: Visible sweat on face or sweat dripping down face    Negative: Sounds like a life-threatening emergency to the triager    Negative: Followed an injury to chest    Negative: SEVERE chest pain    Protocols used: CHEST PAIN-A-OH, HEART RATE AND HEARTBEAT [id number]

## 2020-12-09 NOTE — ED PROVIDER NOTES
History     Chief Complaint:  Palpitations    HPI   Delicia Henry is a 40 year old female who presents with palpitations. The patient reports 2.5 weeks ago she developed constant, intermittent episodes of palpitations most prominent at night with associated achiness in the collar bone region. She drinks caffeine and alcohol, but notes she has not changed her habits recently. She denies any associated dizziness or near syncope. She denies a history of DVT/PE, family history of heart disease, and any possibility of pregnancy. She endorses shortness of breath, however, she states she is still able to exercise. She denies chills, fever, congestion, sore throat, cough, and leg swelling.     The patient tested positive for COVID in March and recovered in April. Following this, she developed shortness of breath and was prescribed Ativan for panic attacks. She then stopped taking Ativan and was started on Zoloft. She reports she had adverse effects to the Zoloft and stopped taking this and then 2.5 weeks ago she developed the palpitations she is presenting with today. Of note, the patient had a negative Echocardiogram on 9/28/20, see results below.    Echocardiogram (9/28/20)  Left ventricular systolic function is normal.  The visual ejection fraction is estimated at 60-65%.  The right ventricle is normal in structure, function and size.  No significant valve dysfunction.  The inferior vena cava was normal in size with preserved respiratory  variability.  There is no pericardial effusion.     Allergies:  Avelox  Erythromycin  Sulfa Drugs  Moxifloxacin      Medications:    Albuterol   Nexplanon    Past Medical History:    Congenital fusion of kidneys  Cleft palate   Asthma   Kidney infection     Past Surgical History:    Neck surgery   Dilation and curettage suction     Family History:    Allergies   Thyroid disease   Kidney issues     Social History:  Smoking status- never smoker  Alcohol use- no  Drug use- no    Marital Status:       Review of Systems   Constitutional: Negative for chills and fever.   HENT: Negative for congestion and sore throat.    Respiratory: Negative for cough and shortness of breath.    Cardiovascular: Positive for palpitations. Negative for leg swelling.        Positive for chest achiness   Neurological: Negative for dizziness and syncope.   All other systems reviewed and are negative.      Physical Exam     Patient Vitals for the past 24 hrs:   BP Temp Temp src Pulse Resp SpO2   12/09/20 1226 (!) 146/80 98  F (36.7  C) Temporal 90 20 100 %     Physical Exam  General: Adult female sitting upright.  Eyes: PERRL, Conjunctive within normal limits  ENT: Moist mucous membranes, oropharynx clear.   Neck: No palpable thyromegaly.  CV: Normal S1S2, no murmur, rub or gallop. Regular rate and rhythm  Resp: Clear to auscultation bilaterally, no wheezes, rales or rhonchi. Normal respiratory effort.  GI: Abdomen is soft, nontender and nondistended. No palpable masses. No rebound or guarding.  MSK: No edema. Nontender. Normal active range of motion.  Skin: Warm and dry. No rashes or lesions or ecchymoses on visible skin.  Neuro: Alert and oriented. Responds appropriately to all questions and commands. No focal findings appreciated. Normal muscle tone.  Psych: Normal mood and affect. Pleasant.    Emergency Department Course     ECG (12:39:53):  Rate 85 bpm. IN interval 146. QRS duration 74. QT/QTc 374/445. P-R-T axes 60 47 45. Normal sinus rhythm. Septal infarct, age undetermined. Abnormal ECG. Interpreted at 1309 by Елена Corrigan MD.     Imagining:  Radiology findings were communicated with the patient who voiced understanding of the findings.    Chest XR, PA & LAT:  Since July 19, 2020, heart size is normal. No pleural  effusion, pneumothorax, or abnormal area of consolidation. Bilateral  breast implants incidentally noted.  As read by Radiology.     Laboratory:  Laboratory findings were  communicated with the patient who voiced understanding of the findings.    CBC: WNL (WBC 7.2, HGB 14.1, )  BMP: Chloride: 110 (H), Anion Gap: 2 (L), o/w WNL (Creatinine: 1.00)    Troponin (Collected 1321): <0.015   D-dimer: 0.3   Magnesium: 2.4 (H)   TSH with free T4 reflex: 1.60     ISTAT HCG Quantitative Pregnancy POCT: <5.0     Interventions:  1329 NS 1L IV Bolus     Emergency Department Course:  Past medical records, nursing notes, and vitals reviewed.    1238 I performed an exam of the patient as documented above.     1239 EKG obtained in the ED, see results above.     1321 IV was inserted and blood was drawn for laboratory testing, results above.    1427 The patient was sent for a XR chest, see results above.     1442 I rechecked the patient and discussed the results of the ED workup thus far.  She denies any new concerns.  She notes she has not had any sensation of palpitations since she has been here.    Findings and plan explained to the Patient. Patient discharged home with instructions regarding supportive care, medications, and reasons to return. The importance of close follow-up was reviewed.     Impression & Plan       Medical Decision Making:  Delicia Henry is a 40 year old female who presents for evaluation of palpitations and atypical chest pain.  Initial ECG was non-concerning and patient is hemodynamically stable.  A broad differential diagnosis was considered including SVT, atrial fibrillation, MAT, ventricular dysrhythmia, thyroid disease, acute electrolyte abnormality, drugs/medication effect or toxicity, caffeine intake or other stimulants, anemia, heart disease, PE, HOCM, WPW, Brugada, ARVC etc. Telemetry monitoring in the ED did not reveal any concerning dysrhythmia.  Echocardiogram from earlier this year was reviewed and normal.  I feel that her symptoms are less likely cardiac in nature today given the patient's H&P, normal ECG, and no significant events on telemetry  monitoring. The workup and exam here in ED would indicate that supportive outpatient management is indicated.  Given the H&P and Emergency Department evaluation PE and ACS are clinically unlikely and she has a normal D-dimer and troponin in the setting of long duration of symptoms. Patient was instructed to follow up with their PCP and possibly cardiology for further evaluation and treatment if the symptoms continue.  Zio patch was placed here in the emergency department with recommendation to follow-up with the primary care provider initially within the next 3 to 5 days.  This was discussed with the patient.  Return precautions were discussed with patient. The patient's questions were answered and the patient was agreeable with discharge. She was discharged in stable condition.    Diagnosis:    ICD-10-CM   1. Palpitations  R00.2   2. Atypical chest pain  R07.89     Disposition:  Discharged to home.    Scribe Disclosure:  IEllen, am serving as a scribe at 12:34 PM on 12/9/2020 to document services personally performed by Елена Corrigan MD based on my observations and the provider's statements to me.        Елена Corrigan MD  12/09/20 [id number]

## 2020-12-09 NOTE — DISCHARGE INSTRUCTIONS
Discharge Instructions  Palpitations    Palpitations are an unusual awareness of your heartbeat. People often describe this as the heart skipping, fluttering, racing, irregular, or pounding. At this time, your provider has found no signs that your palpitations are due to a serious or life-threatening condition. However, sometimes there is a serious problem that does not show up right away.    Palpitations can be caused by caffeine, cigarettes, diet pills, energy drinks or supplements, other stimulants, and medications and street drugs. They can also be caused by anxiety, hormone conditions such as high thyroid, and other medical conditions. Sometimes they are a sign of abnormal rhythm in the heart. At this time, your provider did not find any dangerous cause of your symptoms.    Generally, every Emergency Department visit should have a follow-up clinic visit with either a primary or a specialty clinic/provider. Please follow-up as instructed by your emergency provider today.    Return to the Emergency Department if:  You get chest pain or tightness.  You are short of breath.  You get very weak or tired.  You pass out or faint.  Your heart rate is over 120 beats per minute for more than 10 minutes while you are resting.   You have anything else that worries you.    What can I do to help myself?  Fill any prescriptions the provider gave you and take them right away.   Follow your provider s instructions about the prescription medicines you are on. Sometimes the provider may tell you to stop taking a medicine or change the dose.  If you smoke, this may be a good time to quit! The less you can smoke, the better.  Do not use energy drinks, diet pills, or stimulants. Limit your use of caffeine.  If you were given a prescription for medicine here today, be sure to read all of the information (including the package insert) that comes with your prescription.  This will include important information about the medicine, its  side effects, and any warnings that you need to know about.  The pharmacist who fills the prescription can provide more information and answer questions you may have about the medicine.  If you have questions or concerns that the pharmacist cannot address, please call or return to the Emergency Department.     Remember that you can always come back to the Emergency Department if you are not able to see your regular provider in the amount of time listed above, if you get any new symptoms, or if there is anything that worries you.

## 2020-12-09 NOTE — ED NOTES
Cardiac - Cardiac WDL:  (pt comes in with reports of palpitations at nights and intermittent chest pain by collar bone and on left side of chest. denies SOB, dizziness or sweating. )  Chest Pain Assessment - Chest Pain Location:  (collarbone and on left side of chest) Precipitating Factors: nothing (nothing makes it better or worse.)  Cardiac Monitoring - EKG Monitoring: Yes  Cardiac Rhythm: NSR

## 2020-12-15 ENCOUNTER — OFFICE VISIT (OUTPATIENT)
Dept: FAMILY MEDICINE | Facility: CLINIC | Age: 40
End: 2020-12-15
Payer: COMMERCIAL

## 2020-12-15 ENCOUNTER — MYC MEDICAL ADVICE (OUTPATIENT)
Dept: FAMILY MEDICINE | Facility: CLINIC | Age: 40
End: 2020-12-15

## 2020-12-15 VITALS
OXYGEN SATURATION: 100 % | TEMPERATURE: 96.9 F | HEIGHT: 67 IN | DIASTOLIC BLOOD PRESSURE: 76 MMHG | HEART RATE: 104 BPM | BODY MASS INDEX: 24.48 KG/M2 | SYSTOLIC BLOOD PRESSURE: 109 MMHG | WEIGHT: 156 LBS

## 2020-12-15 DIAGNOSIS — R06.00 DYSPNEA, UNSPECIFIED TYPE: ICD-10-CM

## 2020-12-15 DIAGNOSIS — F41.9 ANXIETY: Primary | ICD-10-CM

## 2020-12-15 PROCEDURE — 99213 OFFICE O/P EST LOW 20 MIN: CPT | Performed by: NURSE PRACTITIONER

## 2020-12-15 RX ORDER — PROPRANOLOL HYDROCHLORIDE 10 MG/1
10 TABLET ORAL 3 TIMES DAILY PRN
Qty: 90 TABLET | Refills: 1 | Status: SHIPPED | OUTPATIENT
Start: 2020-12-15 | End: 2021-02-15

## 2020-12-15 ASSESSMENT — MIFFLIN-ST. JEOR: SCORE: 1400.24

## 2020-12-15 NOTE — PROGRESS NOTES
Subjective   Delicia Francoise Henry is a 40 year old female who presents to clinic today for the following health issues:    HPI   ED/UC Followup:    Facility:  [facility]   Date of visit: 12/9/2020  Reason for visit: Palpitations   Current Status: Pt has a zio hear monitor placed for 4 days. Test came back normal.  Pt states that palpitations have not resolved. Lasted x 20 mins x 1 day ago. Pt reports SOB and chest pressure.  Pt dx with covid in the past and recovered but had chest pressure. Pt was seen in the ER for this problem and was given ativan for anxiety. Ativan did help symptoms. Was recently given zoloft for long term use. Pt states that zoloft did not work for her due to side effects. Would like to discuss treatment.      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  Patient has some support from her mother-in-law and her mom but finds that she is taking on the brunt of the responsibility at home and finding this very frustrating.  Not getting adequate response from her partner when asking for help and becoming increasingly frustrated with workload and expectations at home.  Irritability level is high.  She has not had a history of anxiety but suspects that given stress of current situation she may be experiencing this.    Patient also did have COVID-19 in March.  She is concerned that there may be some sequela underlying that we have not discovered yet.  She does feel shortness of breath on and off and palpitations at times.  Zio patch results are not back yet.      Reviewed and updated as needed this visit by provider:  Tobacco  Allergies  Meds  Problems  Med Hx  Surg Hx  Fam Hx              Constitutional, HEENT, cardiovascular, pulmonary, GI, , musculoskeletal, neuro, skin, endocrine and psych systems are  "negative, except as otherwise noted per HPI.      Objective   /76   Pulse 104   Temp 96.9  F (36.1  C) (Tympanic)   Ht 1.702 m (5' 7\")   Wt 70.8 kg (156 lb)   SpO2 100%   Breastfeeding No   BMI 24.43 kg/m   Body mass index is 24.43 kg/m .  Physical Exam   GENERAL: healthy, alert, well nourished, well hydrated, no distress  HENT: ear canals- normal; TMs- normal; Nose- normal; Mouth- no ulcers, no lesions  NECK: no tenderness, no adenopathy, no asymmetry, no masses, no stiffness; thyroid- normal to palpation  RESP: lungs clear to auscultation - no rales, no rhonchi, no wheezes  CV: regular rates and rhythm, normal S1 S2, no S3 or S4 and no murmur, no click or rub -  MS: extremities- no gross deformities noted, no edema  NEURO: strength and tone- normal, sensory exam- grossly normal, mentation- intact, speech- normal, reflexes- symmetric  PSYCH: Alert and oriented times 3; speech- coherent , normal rate and volume; able to articulate logical thoughts, able to abstract reason, no tangential thoughts, no hallucinations or delusions, affect- normal    Diagnostic Test Results  Pending      Assessment & Plan   Delicia was seen today for er f/u.    Diagnoses and all orders for this visit:    Anxiety  -     propranolol (INDERAL) 10 MG tablet; Take 1 tablet (10 mg) by mouth 3 times daily as needed (anxiety)    Dyspnea, unspecified type  -     propranolol (INDERAL) 10 MG tablet; Take 1 tablet (10 mg) by mouth 3 times daily as needed (anxiety)      Await Zio patch results.  Discussed use of propranolol as needed to control heart rate and help with overall anxiety and irritability.  Discussed with patient to try to find out for stress if she is able to do something for herself, discussed concerns with her partner about imbalance and work load and ask for help.       See Patient Instructions    No follow-ups on file.            SANDEEP Bonilla-C     [address] " [id number]  [email or web address]  [email or web address]   Office: [phone]

## 2020-12-16 NOTE — TELEPHONE ENCOUNTER
Please see my chart message below     Please review and advise     Thank you     Claire Roldan RN, BSN  [facility]

## 2020-12-16 NOTE — TELEPHONE ENCOUNTER
Yes ok to use. If needing inhaler at all she may need to hold propranolol at the time of use as it can interfere with effectiveness of albuterol. If not using much shouldn't be an issue.     Lori Regalado, CNP

## 2020-12-28 ENCOUNTER — OFFICE VISIT (OUTPATIENT)
Dept: URGENT CARE | Facility: URGENT CARE | Age: 40
End: 2020-12-28
Payer: COMMERCIAL

## 2020-12-28 VITALS
DIASTOLIC BLOOD PRESSURE: 78 MMHG | HEART RATE: 86 BPM | RESPIRATION RATE: 16 BRPM | TEMPERATURE: 98 F | SYSTOLIC BLOOD PRESSURE: 117 MMHG | OXYGEN SATURATION: 100 % | BODY MASS INDEX: 22.71 KG/M2 | WEIGHT: 145 LBS

## 2020-12-28 DIAGNOSIS — J01.00 ACUTE NON-RECURRENT MAXILLARY SINUSITIS: ICD-10-CM

## 2020-12-28 DIAGNOSIS — Z20.822 SUSPECTED COVID-19 VIRUS INFECTION: Primary | ICD-10-CM

## 2020-12-28 PROCEDURE — U0003 INFECTIOUS AGENT DETECTION BY NUCLEIC ACID (DNA OR RNA); SEVERE ACUTE RESPIRATORY SYNDROME CORONAVIRUS 2 (SARS-COV-2) (CORONAVIRUS DISEASE [COVID-19]), AMPLIFIED PROBE TECHNIQUE, MAKING USE OF HIGH THROUGHPUT TECHNOLOGIES AS DESCRIBED BY CMS-2020-01-R: HCPCS | Performed by: NURSE PRACTITIONER

## 2020-12-28 PROCEDURE — 99213 OFFICE O/P EST LOW 20 MIN: CPT | Performed by: NURSE PRACTITIONER

## 2020-12-28 RX ORDER — DOXYCYCLINE 100 MG/1
100 CAPSULE ORAL 2 TIMES DAILY
Qty: 14 CAPSULE | Refills: 0 | Status: SHIPPED | OUTPATIENT
Start: 2020-12-28 | End: 2021-01-04

## 2020-12-28 ASSESSMENT — ENCOUNTER SYMPTOMS
SORE THROAT: 0
CHEST TIGHTNESS: 0
SPEECH DIFFICULTY: 0
PARESTHESIAS: 0
NUMBNESS: 0
SINUS PAIN: 1
TREMORS: 0
FEVER: 0
SHORTNESS OF BREATH: 0
SLEEP DISTURBANCE: 1
HEADACHES: 1
CHILLS: 0
WEAKNESS: 0
MYALGIAS: 0
FATIGUE: 0
VOICE CHANGE: 0
APPETITE CHANGE: 0
SINUS PRESSURE: 1
ADENOPATHY: 0
WHEEZING: 0
COUGH: 1
DIAPHORESIS: 0
FACIAL ASYMMETRY: 0
LIGHT-HEADEDNESS: 1
NAUSEA: 1

## 2020-12-28 NOTE — PROGRESS NOTES
Chief Complaint   Patient presents with     Urgent Care     Sx Ongoing for 24 Hours     Sinus Problem     Facial Pain and Frontal/Orbital Pain-Slight Congestion     SUBJECTIVE:  Delicia Henry is a 40 year old female who presents to the clinic with sinus pressure, pain, lightheadedness, post nasal drip, cough, nausea for 24 hours. She has never really had migraines or sinusitis like this before. Pain and pressure is mostly maxillary, but also ethmoid. Denies photophobia, severe headache, vision change. Has tried tylenol and dayquil. Both kids have runny stuffy noses. Saw family over odalis, but no known covid contacts.    Past Medical History:   Diagnosis Date     Asthma     exercise induced asthma     MEDICAL HISTORY OF -     recurrent uti's urethral dilitation , tigonitis.     Molar pregnancy           albuterol (PROAIR HFA/PROVENTIL HFA/VENTOLIN HFA) 108 (90 Base) MCG/ACT inhaler, Inhale 2 puffs into the lungs every 6 hours as needed for shortness of breath / dyspnea or wheezing FOR SHORTNESS OF BREATH OR DIFFICULT BREATHING OR WHEEZING       etonogestrel (NEXPLANON) 68 MG IMPL, 1 each (68 mg) by Subdermal route continuous       propranolol (INDERAL) 10 MG tablet, Take 1 tablet (10 mg) by mouth 3 times daily as needed (anxiety)    No current facility-administered medications on file prior to visit.     Social History     Tobacco Use     Smoking status: Never Smoker     Smokeless tobacco: Never Used   Substance Use Topics     Alcohol use: No     Allergies   Allergen Reactions     Avelox Swelling     Swelling tongue, dizzy, itching     Erythromycin Nausea     Sulfa Drugs      Unknown reaction     Review of Systems   Constitutional: Negative for appetite change, chills, diaphoresis, fatigue and fever.   HENT: Positive for congestion, postnasal drip, sinus pressure and sinus pain. Negative for ear pain, sore throat and voice change.    Respiratory: Positive for cough. Negative for chest tightness,  shortness of breath and wheezing.    Gastrointestinal: Positive for nausea.   Musculoskeletal: Negative for myalgias.   Skin: Negative for pallor and rash.   Neurological: Positive for light-headedness and headaches. Negative for tremors, syncope, facial asymmetry, speech difficulty, weakness, numbness and paresthesias.   Hematological: Negative for adenopathy.   Psychiatric/Behavioral: Positive for sleep disturbance.     EXAM:   /78   Pulse 86   Temp 98  F (36.7  C) (Oral)   Resp 16   Wt 65.8 kg (145 lb)   SpO2 100%   BMI 22.71 kg/m      Physical Exam  Constitutional:       General: She is not in acute distress.     Appearance: She is well-developed. She is not ill-appearing, toxic-appearing or diaphoretic.   HENT:      Head: Normocephalic and atraumatic.      Right Ear: External ear normal.      Left Ear: External ear normal.      Nose: Congestion present.      Mouth/Throat:      Mouth: Mucous membranes are moist.      Pharynx: Oropharynx is clear.   Eyes:      General:         Right eye: No discharge.         Left eye: No discharge.      Extraocular Movements: Extraocular movements intact.      Conjunctiva/sclera: Conjunctivae normal.      Pupils: Pupils are equal, round, and reactive to light.   Neck:      Musculoskeletal: Normal range of motion and neck supple.   Cardiovascular:      Rate and Rhythm: Normal rate.      Pulses: Normal pulses.   Pulmonary:      Effort: Pulmonary effort is normal. No respiratory distress.      Breath sounds: Normal breath sounds. No stridor. No wheezing, rhonchi or rales.   Chest:      Chest wall: No tenderness.   Musculoskeletal: Normal range of motion.   Lymphadenopathy:      Cervical: No cervical adenopathy.   Skin:     General: Skin is warm and dry.      Capillary Refill: Capillary refill takes less than 2 seconds.      Coloration: Skin is not pale.      Findings: No rash.   Neurological:      General: No focal deficit present.      Mental Status: She is alert and  oriented to person, place, and time.      Coordination: Coordination normal.   Psychiatric:         Mood and Affect: Mood normal.         Behavior: Behavior normal.       ASSESSMENT:    ICD-10-CM    1. Suspected COVID-19 virus infection  Z20.828 Symptomatic COVID-19 Virus (Coronavirus) by PCR   2. Acute non-recurrent maxillary sinusitis  J01.00 doxycycline hyclate (VIBRAMYCIN) 100 MG capsule     PLAN:  Patient Instructions   Your symptoms appear to be viral at this time.  Secondary bacterial infections only happen in about 0.5-2% of cases.  Antibiotics are recommended only if you do not have any relief from your symptoms in 10 days or symptoms worsen after 7 days. Okay to  next Sunday if worsening.  COVID test pending, check mychart in about 2 days.  Take Tylenol or an NSAID such as ibuprofen or naproxen as needed for pain.  May use netti pot with bottled or distilled water and saline packets to flush sinuses.  Sudafed (pseudoephedrine) behind the pharmacist counter for 3-5 days helps relieve congestion.  Mucinex (guiafenesin) thins mucus and may help it to loosen more quickly  Saline drops or nasal sprays may loosen mucus.  Sit in the bathroom with the door closed and hot shower running to loosen mucus.  Contact primary care clinic if you do not have any relief from your symptoms after 10 days.  Present to emergency room for significantly increasing pain, persistent high fever >102F, swelling/redness around your eyes, changes in your vision or ability to move your eyes, altered mental status or a severe headache, shortness of breath, chest pain, dizziness.    Follow up with primary care provider with any problems, questions or concerns or if symptoms worsen or fail to improve. Patient agreed to plan and verbalized understanding.    Kim Ramirez, [facility]

## 2020-12-28 NOTE — PATIENT INSTRUCTIONS
Your symptoms appear to be viral at this time.  Secondary bacterial infections only happen in about 0.5-2% of cases.  Antibiotics are recommended only if you do not have any relief from your symptoms in 10 days or symptoms worsen after 7 days. Okay to  next Sunday if worsening.  COVID test pending, check mychart in about 2 days.  Take Tylenol or an NSAID such as ibuprofen or naproxen as needed for pain.  May use netti pot with bottled or distilled water and saline packets to flush sinuses.  Sudafed (pseudoephedrine) behind the pharmacist counter for 3-5 days helps relieve congestion.  Mucinex (guiafenesin) thins mucus and may help it to loosen more quickly  Saline drops or nasal sprays may loosen mucus.  Sit in the bathroom with the door closed and hot shower running to loosen mucus.  Contact primary care clinic if you do not have any relief from your symptoms after 10 days.  Present to emergency room for significantly increasing pain, persistent high fever >102F, swelling/redness around your eyes, changes in your vision or ability to move your eyes, altered mental status or a severe headache, shortness of breath, chest pain, dizziness.

## 2020-12-29 LAB
LABORATORY COMMENT REPORT: ABNORMAL
SARS-COV-2 RNA SPEC QL NAA+PROBE: NORMAL
SARS-COV-2 RNA SPEC QL NAA+PROBE: POSITIVE
SPECIMEN SOURCE: ABNORMAL
SPECIMEN SOURCE: NORMAL

## 2021-01-03 ENCOUNTER — NURSE TRIAGE (OUTPATIENT)
Dept: NURSING | Facility: CLINIC | Age: 41
End: 2021-01-03

## 2021-01-03 NOTE — TELEPHONE ENCOUNTER
"Patient says she tested positive w/ COVID-19 on 12/28 and is feeling better.  Patient says she is taking Vitamin d 2000 mg, Vitamin C 1000 mg and Zinc 50 mg.  Patient says she is having a headache after she takes them and wants to know if she is taking too much of these supplements especially Zinc.  FNA informed patient of USDA RDA of Vit C, D and zinc.  Patient is taking above what is recommended (for women: 8mg, max dose 40mg) and says will stop taking it to see if the headache will go away.       Additional Information    Negative: MORE THAN A DOUBLE DOSE of a prescription or over-the-counter (OTC) drug    Negative: [1] DOUBLE DOSE (an extra dose or lesser amount) of over-the-counter (OTC) drug AND [2] any symptoms (e.g., dizziness, nausea, pain, sleepiness)    Negative: [1] DOUBLE DOSE (an extra dose or lesser amount) of prescription drug AND [2] any symptoms (e.g., dizziness, nausea, pain, sleepiness)    Negative: Took another person's prescription drug    Negative: [1] DOUBLE DOSE (an extra dose or lesser amount) of prescription drug AND [2] NO symptoms (Exception: a double dose of antibiotics)    Negative: Diabetes drug error or overdose (e.g., insulin or extra dose)    Negative: [1] Request for URGENT new prescription or refill of \"essential\" medication (i.e., likelihood of harm to patient if not taken) AND [2] triager unable to fill per unit policy    Negative: [1] Prescription not at pharmacy AND [2] was prescribed today by PCP    Negative: Pharmacy calling with prescription questions and triager unable to answer question    Negative: Caller has urgent medication question about med that PCP prescribed and triager unable to answer question    Negative: Caller has medication question about med not prescribed by PCP and triager unable to answer question (e.g., compatibility with other med, storage)    Negative: Caller has NON-URGENT medication question about med that PCP prescribed and triager unable to " answer question    Negative: Caller requesting a NON-URGENT new prescription or refill and triager unable to refill per unit policy    Negative: [1] DOUBLE DOSE (an extra dose or lesser amount) of over-the-counter (OTC) drug AND [2] NO symptoms    Negative: [1] DOUBLE DOSE (an extra dose or lesser amount) of antibiotic drug AND [2] NO symptoms    Caller has medication question only, adult not sick, and triager answers question    Protocols used: MEDICATION QUESTION CALL-A-

## 2021-01-04 ENCOUNTER — VIRTUAL VISIT (OUTPATIENT)
Dept: FAMILY MEDICINE | Facility: OTHER | Age: 41
End: 2021-01-04

## 2021-01-04 ENCOUNTER — APPOINTMENT (OUTPATIENT)
Dept: GENERAL RADIOLOGY | Facility: CLINIC | Age: 41
End: 2021-01-04
Attending: EMERGENCY MEDICINE
Payer: COMMERCIAL

## 2021-01-04 ENCOUNTER — HOSPITAL ENCOUNTER (EMERGENCY)
Facility: CLINIC | Age: 41
Discharge: HOME | End: 2021-01-04
Attending: EMERGENCY MEDICINE | Admitting: EMERGENCY MEDICINE
Payer: COMMERCIAL

## 2021-01-04 VITALS
HEART RATE: 102 BPM | RESPIRATION RATE: 18 BRPM | DIASTOLIC BLOOD PRESSURE: 101 MMHG | BODY MASS INDEX: 22.87 KG/M2 | WEIGHT: 146 LBS | SYSTOLIC BLOOD PRESSURE: 138 MMHG | OXYGEN SATURATION: 100 % | TEMPERATURE: 98 F

## 2021-01-04 DIAGNOSIS — U07.1 2019 NOVEL CORONAVIRUS DISEASE (COVID-19): ICD-10-CM

## 2021-01-04 DIAGNOSIS — R07.9 CHEST PAIN, UNSPECIFIED TYPE: ICD-10-CM

## 2021-01-04 LAB
ANION GAP SERPL CALCULATED.3IONS-SCNC: 5 MMOL/L (ref 3–14)
BASOPHILS # BLD AUTO: 0.1 10E9/L (ref 0–0.2)
BASOPHILS NFR BLD AUTO: 1 %
BUN SERPL-MCNC: 11 MG/DL (ref 7–30)
CALCIUM SERPL-MCNC: 9.2 MG/DL (ref 8.5–10.1)
CHLORIDE SERPL-SCNC: 107 MMOL/L (ref 94–109)
CO2 SERPL-SCNC: 28 MMOL/L (ref 20–32)
CREAT SERPL-MCNC: 0.92 MG/DL (ref 0.52–1.04)
D DIMER PPP FEU-MCNC: <0.3 UG/ML FEU (ref 0–0.5)
DIFFERENTIAL METHOD BLD: NORMAL
EOSINOPHIL # BLD AUTO: 0 10E9/L (ref 0–0.7)
EOSINOPHIL NFR BLD AUTO: 0.4 %
ERYTHROCYTE [DISTWIDTH] IN BLOOD BY AUTOMATED COUNT: 11.5 % (ref 10–15)
GFR SERPL CREATININE-BSD FRML MDRD: 77 ML/MIN/{1.73_M2}
GLUCOSE SERPL-MCNC: 104 MG/DL (ref 70–99)
HCT VFR BLD AUTO: 42.4 % (ref 35–47)
HGB BLD-MCNC: 14 G/DL (ref 11.7–15.7)
IMM GRANULOCYTES # BLD: 0 10E9/L (ref 0–0.4)
IMM GRANULOCYTES NFR BLD: 0.4 %
LYMPHOCYTES # BLD AUTO: 1.5 10E9/L (ref 0.8–5.3)
LYMPHOCYTES NFR BLD AUTO: 22.8 %
MCH RBC QN AUTO: 30.4 PG (ref 26.5–33)
MCHC RBC AUTO-ENTMCNC: 33 G/DL (ref 31.5–36.5)
MCV RBC AUTO: 92 FL (ref 78–100)
MONOCYTES # BLD AUTO: 0.3 10E9/L (ref 0–1.3)
MONOCYTES NFR BLD AUTO: 4.5 %
NEUTROPHILS # BLD AUTO: 4.8 10E9/L (ref 1.6–8.3)
NEUTROPHILS NFR BLD AUTO: 70.9 %
NRBC # BLD AUTO: 0 10*3/UL
NRBC BLD AUTO-RTO: 0 /100
PLATELET # BLD AUTO: 308 10E9/L (ref 150–450)
POTASSIUM SERPL-SCNC: 3.6 MMOL/L (ref 3.4–5.3)
RBC # BLD AUTO: 4.6 10E12/L (ref 3.8–5.2)
SODIUM SERPL-SCNC: 140 MMOL/L (ref 133–144)
TROPONIN I SERPL-MCNC: <0.015 UG/L (ref 0–0.04)
WBC # BLD AUTO: 6.7 10E9/L (ref 4–11)

## 2021-01-04 PROCEDURE — 85379 FIBRIN DEGRADATION QUANT: CPT | Performed by: EMERGENCY MEDICINE

## 2021-01-04 PROCEDURE — 84484 ASSAY OF TROPONIN QUANT: CPT | Performed by: EMERGENCY MEDICINE

## 2021-01-04 PROCEDURE — 99285 EMERGENCY DEPT VISIT HI MDM: CPT | Mod: 25

## 2021-01-04 PROCEDURE — 93005 ELECTROCARDIOGRAM TRACING: CPT

## 2021-01-04 PROCEDURE — 71045 X-RAY EXAM CHEST 1 VIEW: CPT

## 2021-01-04 PROCEDURE — 85025 COMPLETE CBC W/AUTO DIFF WBC: CPT | Performed by: EMERGENCY MEDICINE

## 2021-01-04 PROCEDURE — 80048 BASIC METABOLIC PNL TOTAL CA: CPT | Performed by: EMERGENCY MEDICINE

## 2021-01-04 ASSESSMENT — ENCOUNTER SYMPTOMS
VOMITING: 0
COUGH: 0
DIARRHEA: 0
NAUSEA: 1
ABDOMINAL PAIN: 0

## 2021-01-04 NOTE — ED TRIAGE NOTES
A&O x4, ABCs intact. Pt presents with chest pain and SOB. Pt states that she tested positive for COVID on the 28th of December. Sx started Gifty Flores. Pt reports that she felt well yesterday and then woke up today with SOB and tightness in her chest.

## 2021-01-04 NOTE — PROGRESS NOTES
"Date: 2021 14:31:55  Clinician: Oz Hager  Clinician NPI: [id number]  Patient: Delicia Henry  Patient : 1980  Patient Address: [address] [place] [address], [place] [address]  Patient Phone: [phone]  Visit Protocol: URI  Patient Summary:  Delicia is a 40 year old ( : 1980 ) female who initiated a [facility] Visit for COVID-19 (Coronavirus) evaluation and screening. When asked the question \"Please sign me up to receive news, health information and promotions from [facility].\", Delicia responded \"No\".    Delicia states her symptoms started gradually 10-13 days ago. After her symptoms started, they improved and then got worse again.   Her symptoms consist of facial pain or pressure, myalgia, anosmia, a headache, nasal congestion, malaise, and ageusia. She is experiencing mild difficulty breathing with activities but can speak normally in full sentences.   Symptom details     Nasal secretions: The color of her mucus is clear.    Facial pain or pressure: The facial pain or pressure does not feel worse when bending or leaning forward.     Headache: She states the headache is moderate (4-6 on a 10 point pain scale).      Delicia denies having diarrhea, ear pain, wheezing, fever, cough, nausea, chills, sore throat, teeth pain, vomiting, and rhinitis. She also denies having a sinus infection within the past year, having recent facial or sinus surgery in the past 60 days, and taking antibiotic medication in the past month.   Precipitating events  She has not recently been exposed to someone with influenza. Delicia has been in close contact with the following high risk individuals: children under the age of 5.   Pertinent COVID-19 (Coronavirus) information  Delicia does not work or volunteer as healthcare worker or a . In the past 14 days, Delicia has not worked or volunteered at a healthcare facility or group living setting.   In the past 14 days, she also has not lived in a congregate " living setting.   Delicia has not had a close contact with a laboratory-confirmed COVID-19 patient within 14 days of symptom onset.    Delicia has been tested for COVID-19.      Date(s) of her COVID-19 test as reported by the patient (free text): 12/28/2020       Result of COVID-19 test as reported by the patient (free text): Positive       Type of test as reported by the patient (free text): Pcr        Pertinent medical history  Delicai has asthma. She uses quick-relief inhaler less than two times per week. She refills her quick-relief inhaler less than two times per year. She wakes up at night with asthma symptoms less than two times per month.   Delicia typically gets a yeast infection when she takes antibiotics. She is not sure if she has used fluconazole (Diflucan) to treat previous yeast infections.   She has not been told by her provider to avoid NSAIDs.   Delicia does not have diabetes. She denies having immunosuppressive conditions (e.g., chemotherapy, HIV, organ transplant, long-term use of steroids or other immunosuppressive medications, splenectomy). She denies having congestive heart failure and severe COPD.   Delicia does not need a return to work/school note.   Delicia does not smoke or use smokeless tobacco.   She denies pregnancy and denies breastfeeding. She has menstruated in the past month.   Additional information as reported by the patient (free text): I'm checking if I should be seen, i have been sick since 12/25 and have been up and down the whole time, some days better than others, constant dizziness, and pain in my eyes, headache, today my breathing feels much worse I have a pulsox and it reads 96, it feels like my chest is full but I don't have a cough, my chest aches and feels tight. If I try to push a breath out I can't and I cough.  No fever ever through the whole illness.   Weight: 148 lbs    MEDICATIONS: No current medications, ALLERGIES: erythromycin base  Clinician Response:  Dear  Delicia,  I am sorry you are not feeling well. To determine the most appropriate care for you, I would like you to be seen in person to further discuss your health history and symptoms.  You will not be charged for this [facility] Visit. Thank you for trusting us with your care.  For the latest updates on COVID-19 (Coronavirus), please visit the Centers for Disease Control and Prevention (CDC).   Diagnosis: Refer for additional evaluation  Diagnosis ICD: R69

## 2021-01-04 NOTE — ED AVS SNAPSHOT
[facility] Emergency Dept  [address]  [facility] [address]  Phone: [phone]  Fax: [phone]                                    Delicia Henry   MRN: [id number]    Department: [facility] Emergency Dept   Date of Visit: 1/4/2021           After Visit Summary Signature Page    I have received my discharge instructions, and my questions have been answered. I have discussed any challenges I see with this plan with the nurse or doctor.    ..........................................................................................................................................  Patient/Patient Representative Signature      ..........................................................................................................................................  Patient Representative Print Name and Relationship to Patient    ..................................................               ................................................  Date                                   Time    ..........................................................................................................................................  Reviewed by Signature/Title    ...................................................              ..............................................  Date                                               Time          22EPIC Rev 08/18

## 2021-01-04 NOTE — ED PROVIDER NOTES
History   Chief Complaint:  Chest Pain and Shortness of Breath     HPI   Delicia Henry is a 40 year old female with history of asthma and horseshoe kidney who presents with chest achiness, pounding headaches and shortness of breath.  The patient reports that since 12/24/2020 she has been experiencing pounding headaches and congestive feeling in her chest.  Her symptoms have significantly worsened over the past 2 days, finally prompting her to come into the ED. she did not sleep at all last night, and woke up this morning with aching chest pain and shortness of breath.  She is also nauseous and constantly dizzy.  Her symptoms are slightly better on arrival, but still present.  Has an albuterol inhaler, however only uses this once or twice per month.  No vomiting, cough, diarrhea, abdominal pain, or leg swelling.  No recent surgeries of any kind.  No family history of heart problems.    Review of Systems   Constitutional: Negative for chills and fever.   Respiratory: Negative for cough and shortness of breath.    Cardiovascular: Negative for leg swelling.        Chest aches   Gastrointestinal: Positive for nausea. Negative for abdominal pain, diarrhea and vomiting.   All other systems reviewed and are negative.        Allergies:  Avelox  Erythromycin  Sulfa Drugs  Moxifloxacin    Medications:  Etonogestrel  Propranolol    Past Medical History:    Asthma  Horseshoe kidney  Cleft palate     Past Surgical History:    Benign ear tumor removal     Family History:    Thyroid disease (Mother)    Social History:  Presents to ED from home  Smoking status: Never smoker  PCP: [facility]    Physical Exam     Patient Vitals for the past 24 hrs:   BP Temp Temp src Pulse Resp SpO2 Weight   01/04/21 1930 -- -- -- -- -- 99 % --   01/04/21 1900 -- -- -- -- -- 98 % --   01/04/21 1551 (!) 138/101 98  F (36.7  C) Temporal 102 18 100 % 66.2 kg (146 lb)       Physical Exam  Constitutional: Well  appearing.  HEENT: Atraumatic.   Moist mucous membranes.  Neck: Soft.  Supple.   Cardiac: Regular rate and rhythm.  No murmur or rub.  Respiratory: Clear to auscultation bilaterally.  No respiratory distress.  No wheezing, rhonchi, or rales.  Abdomen: Soft and nontender.  No rebound or guarding.  Nondistended.  Musculoskeletal: No edema.  Normal range of motion.  Neurologic: Alert and oriented x3.  Normal tone and bulk. Normal gait.  Skin: No rashes.  No edema.  Psych: Normal affect.  Normal behavior.      Emergency Department Course   ECG:  ECG taken at 1616  Normal sinus rhythm  Normal ECG  Rate 84 bpm. MI interval 142 ms. QRS duration 80 ms. QT/QTc 390/460 ms. P-R-T axes 55 37 50.     Imaging:  XR Chest Port 1 View  Negative portable chest. Lungs remain clear.  Reading per radiology     Laboratory:  CBC: WBC 6.7, HGB 14.0,   BMP: Glucose 104 (H) o/w WNL (Creatinine 0.92)    Troponin (Collected 1745): <0.015   D-dimer: <0.3    Emergency Department Course:  Reviewed:  1734 I reviewed the patient's nursing notes, vitals, past medical records, Care Everywhere.     Assessments:  1759 I performed an exam of the patient as documented above.     Disposition:  The patient was discharged to home.     Impression & Plan   Medical Decision Making:  Delicia Henry is a 40-year-old woman who is afebrile and hemodynamically stable.  She is known Covid positive.  Her EKG exhibits a normal sinus rhythm with no acute ischemic changes on my read.  Chest x-ray is unremarkable for acute disease on my read.  She has no tachycardia or pleuritic chest pain or hypoxia is just a PE and a D-dimer is within normal limits and she is low risk Wells criteria for PE and I think this excludes a PE.  Her troponin is within normal limits.  She shows no evidence of myocarditis or pericarditis.  We discussed the results with her.  She has no hypoxia and there is no indication for hospitalization today.  She is low risk for coronary  disease and has a low risk heart score and can safely follow-up with her primary care physician.  I discussed this plan with her and she is in agreement.  We discussed abortive care at home and return precautions were given.  She was in no distress at time of discharge.    Diagnosis:    ICD-10-CM    1. Chest pain, unspecified type  R07.9    2. 2019 novel coronavirus disease (COVID-19)  U07.1        Scribe Disclosure:  I, Ben Pena, am serving as a scribe at 5:40 PM on 1/4/2021 to document services personally performed by Shant Madrigal MD based on my observations and the provider's statements to me.      Shant Madrigal MD  01/05/21 4054

## 2021-01-05 LAB — INTERPRETATION ECG - MUSE: NORMAL

## 2021-01-05 ASSESSMENT — ENCOUNTER SYMPTOMS
FEVER: 0
SHORTNESS OF BREATH: 0
CHILLS: 0

## 2021-01-05 NOTE — DISCHARGE INSTRUCTIONS
Discharge Instructions  Chest Pain    You have been seen today for chest pain or discomfort.  At this time, your provider has found no signs that your chest pain is due to a serious or life-threatening condition, (or you have declined more testing and/or admission to the hospital). However, sometimes there is a serious problem that does not show up right away. Your evaluation today may not be complete and you may need further testing and evaluation.     Generally, every Emergency Department visit should have a follow-up clinic visit with either a primary or a specialty clinic/provider. Please follow-up as instructed by your emergency provider today.  Return to the Emergency Department if:  Your chest pain changes, gets worse, starts to happen more often, or comes with less activity.  You are newly short of breath.  You get very weak or tired.  You pass out or faint.  You have any new symptoms, like fever, cough, numb legs, or you cough up blood.  You have anything else that worries you.    Until you follow-up with your regular provider, please do the following:  Take one aspirin daily unless you have an allergy or are told not to by your provider.  If a stress test appointment has been made, go to the appointment.  If you have questions, contact your regular provider.  Follow-up with your regular provider/clinic as directed; this is very important.    If you were given a prescription for medicine here today, be sure to read all of the information (including the package insert) that comes with your prescription.  This will include important information about the medicine, its side effects, and any warnings that you need to know about.  The pharmacist who fills the prescription can provide more information and answer questions you may have about the medicine.  If you have questions or concerns that the pharmacist cannot address, please call or return to the Emergency Department.       Remember that you can always come  back to the Emergency Department if you are not able to see your regular provider in the amount of time listed above, if you get any new symptoms, or if there is anything that worries you.    Discharge Instructions  COVID-19    COVID-19 is the disease caused by a new coronavirus. The virus spreads from person-to-person primarily by droplets when an infected person coughs or sneezes and the droplet either lands on another person or that other person touches a surface with the droplet on it. There are tests available to diagnose COVID-19. There is no specific treatment or medicine for the disease.    You may have been diagnosed with COVID, may be being tested for COVID and have a pending test result, or may have been exposed to COVID.    Symptoms of COVID-19    Many people have no symptoms or mild symptoms.  Symptoms may usually appear 4 to 5 days (up to 14 days) after contact with a person with COVID-19. Some people will get severe symptoms and pneumonia. Usual symptoms are:     ? Fever  ? Cough  ? Trouble breathing    Less common symptoms are: Headache, body aches, sore throat, sneezing, diarrhea, loss of taste or smell.    Isolation and Quarantine    You were seen because you have symptoms, had an exposure, or had some other concern about possible COVID. The best way to stop the spread of the virus is to avoid contact with others.  Isolation refers to sick people staying away from people who are not sick. A person in quarantine is limiting activity because they were exposed and are waiting to see if they might become sick.    If you test positive for COVID, you should stay home (isolation) for at least 10 days after your symptoms began, and for 24 hours with no fever and improvement of symptoms--whichever is longer. (Your fever should be gone for 24 hours without using fever-reducing medicine). If you have no symptoms, you should stay home (isolation) for 10 days from the day of the test.    For example, if you  have a fever and cough for 6 days, you need to stay home 4 more days with no fever for a total of 10 days. Or, if you have a fever and cough for 10 days, you need to stay home one more day with no fever for a total of 11 days.    If you have a high-risk exposure to COVID (you spent 15 minutes or more within six feet of somebody who has COVID), you should stay home (quarantine) for 14 days. Even if you test negative for COVID, the CDC recommends a 14-day quarantine from the time of your last exposure to that individual. There are options for a shortened (<14 day quarantine) you can review at:    https://www.health.[facility].[place].us/diseases/coronavirus/close.html#long    If you have symptoms but a negative test, you should stay at home until you are symptom-free and without fever for 24 hours, using the same judgment you would for when it is safe to return to work/school from strep throat, influenza, or the common cold. If you worsen, you should consider being re-evaluated.    If you are being tested for COVID and your test is pending, you should stay home until you know your test result.    How should I protect myself and others?    Do not go to work or school. Have a friend or relative do your shopping. Do not use public transportation (bus, train) or ridesharing (Lyft, Uber).    Separate yourself from other people in your home. As much as possible, you should stay in one room and away from other people in your home. Also, use a separate bathroom, if possible. Avoid handling pets or other animals while sick.     Wear a facemask if you need to be around other people and cover your mouth and nose with a tissue when you cough or sneeze.     Avoid sharing personal household items. You should not share dishes, drinking glasses, forks/knives/spoons, towels, or bedding with other people in your home. After using these items, they should be washed with soap and water. Clean parts of your home that are touched often (doorknobs,  faucets, countertops, etc.) daily.     Wash your hands often with soap and water for at least 20 seconds or use an alcohol-based hand  containing at least 60% alcohol.     Avoid touching your face.    Treat your symptoms. You can take Acetaminophen (Tylenol) to treat body aches and fever as needed for comfort. Ibuprofen (Advil or Motrin) can be used as well if you still have symptoms after taking Tylenol. Drink fluids. Rest.    Watch for worsening symptoms such as shortness of breath/difficulty breathing or very severe weakness.    Employers/workplaces are being asked by the Centers for Disease Control (CDC) to not request notes/documentation for you to return to work or prove that you were ill. You may choose to show your employer this paperwork. Also, repeat testing should not be required to return to work.    Return to the Emergency Department if:    If you are developing worsening breathing, shortness of breath, or feel worse you should seek medical attention.  If you are uncertain, contact your health care provider/clinic. If you need emergency medical attention, call 911 and tell them you have been ill.

## 2021-01-15 ENCOUNTER — HEALTH MAINTENANCE LETTER (OUTPATIENT)
Age: 41
End: 2021-01-15

## 2021-01-25 ENCOUNTER — OFFICE VISIT (OUTPATIENT)
Dept: FAMILY MEDICINE | Facility: CLINIC | Age: 41
End: 2021-01-25
Payer: COMMERCIAL

## 2021-01-25 VITALS
BODY MASS INDEX: 24.01 KG/M2 | OXYGEN SATURATION: 100 % | TEMPERATURE: 97.5 F | DIASTOLIC BLOOD PRESSURE: 76 MMHG | SYSTOLIC BLOOD PRESSURE: 126 MMHG | HEART RATE: 87 BPM | WEIGHT: 153 LBS | HEIGHT: 67 IN

## 2021-01-25 DIAGNOSIS — S46.812A STRAIN OF LEFT TRAPEZIUS MUSCLE, INITIAL ENCOUNTER: Primary | ICD-10-CM

## 2021-01-25 PROCEDURE — 99213 OFFICE O/P EST LOW 20 MIN: CPT | Performed by: NURSE PRACTITIONER

## 2021-01-25 RX ORDER — CYCLOBENZAPRINE HCL 5 MG
5 TABLET ORAL 3 TIMES DAILY PRN
Qty: 30 TABLET | Refills: 1 | Status: SHIPPED | OUTPATIENT
Start: 2021-01-25 | End: 2021-04-15

## 2021-01-25 ASSESSMENT — MIFFLIN-ST. JEOR: SCORE: 1386.63

## 2021-01-25 NOTE — PROGRESS NOTES
Assessment & Plan   Problem List Items Addressed This Visit     None      Visit Diagnoses     Strain of left trapezius muscle, initial encounter    -  Primary    Relevant Medications    cyclobenzaprine (FLEXERIL) 5 MG tablet         ER notes reviewed. Testing has shown no cardiac concern. Pain is likely muscular in origin given her job and history of tension on left side. Discuss treating muscle and can follow up with formal PT if needed.   Review of external notes as documented above       21 minutes spent on the date of the encounter doing chart review, history and exam, documentation and further activities as noted above           Medication and stretching discussed. Let us know if not improving.     No follow-ups on file.    Lori Regalado, JUAN  [facility] PRIOR TANYA Nesbitt is a 40 year old who presents to clinic today for the following health issues     HPI       ED/UC Followup:    Facility:  [facility] ER   Date of visit: 1/4/2020  Reason for visit: Chest pain and intermittent heart palpitations   Current Status: Pt states that when she takes a deep breath she feels sharp chest pain. Heart palpitations still persists. Pt report doing a Zio Patch monitor and did not receive results. Would like to go over results.      Also notes some pain in left arm intermittently. Feels like a pinched nerve. Comes down to middle finger, seems likely coming from shoulder/neck area. No pain in wrist or elbow.      Review of Systems   Constitutional, HEENT, cardiovascular, pulmonary, GI, , musculoskeletal, neuro, skin, endocrine and psych systems are negative, except as otherwise noted.      Objective    There were no vitals taken for this visit.  There is no height or weight on file to calculate BMI.  Physical Exam   GENERAL: healthy, alert and no distress  EYES: Eyes grossly normal to inspection, PERRL and conjunctivae and sclerae normal  HENT: ear canals and TM's normal, nose  and mouth without ulcers or lesions  NECK: no adenopathy, no asymmetry, masses, or scars and thyroid normal to palpation  RESP: lungs clear to auscultation - no rales, rhonchi or wheezes  CV: regular rate and rhythm, normal S1 S2, no S3 or S4, no murmur, click or rub, no peripheral edema and peripheral pulses strong  ABDOMEN: soft, nontender, no hepatosplenomegaly, no masses and bowel sounds normal  MS: normal muscle tone and tenderness to palpation left paracervical, shoulder blade and upper trapezius.  NEURO: Normal strength and tone, mentation intact and speech normal                          DORINDA Bonilla     [address] [address]  [email or web address]   Office: [phone]

## 2021-01-29 ENCOUNTER — VIRTUAL VISIT (OUTPATIENT)
Dept: FAMILY MEDICINE | Facility: CLINIC | Age: 41
End: 2021-01-29
Payer: COMMERCIAL

## 2021-01-29 ENCOUNTER — NURSE TRIAGE (OUTPATIENT)
Dept: NURSING | Facility: CLINIC | Age: 41
End: 2021-01-29

## 2021-01-29 DIAGNOSIS — G93.31 POSTVIRAL FATIGUE SYNDROME: Primary | ICD-10-CM

## 2021-01-29 DIAGNOSIS — R00.2 PALPITATIONS: ICD-10-CM

## 2021-01-29 PROCEDURE — 99213 OFFICE O/P EST LOW 20 MIN: CPT | Mod: 95 | Performed by: NURSE PRACTITIONER

## 2021-01-29 NOTE — PROGRESS NOTES
Delicia is a 40 year old who is being evaluated via a billable video visit.      How would you like to obtain your AVS? MyChart  If the video visit is dropped, the invitation should be resent by: Text to cell phone: [phone]  Will anyone else be joining your video visit? No      Video Start Time:   Assessment & Plan     Postviral fatigue syndrome- intermittent   - CARDIOLOGY EVAL ADULT REFERRAL; Future    Palpitations  - CARDIOLOGY EVAL ADULT REFERRAL; Future          17 minutes spent on the date of the encounter doing chart review, history and exam, documentation and further activities as noted above         CONSULTATION/REFERRAL to cardiology to discuss options.    No follow-ups on file.    Lori Regalado CNP  [facility] PRIOR [facility]     Delicia is a 40 year old who presents to clinic today for the following health issues     HPI     16 mins  Recheck:   Pt was seen on 1/25/2021 for similar concern  Triage Note:  Pt states she is having heart palpitations  Hs been having heart symptoms since November --   Had covid in December -- felt better from covid after 3 weeks   Had been to provider and ED and had heart ' work up'   Last seen on1/25 in clinic -- has had palpitations since Saturday --   Today feeling palpitations -- about 2 hrs ago palpitations lasted about 1/2 hr   None now   Per apple watch P = 78-85 --   Denies 'chest pain' -- but sometimes feels some sharpness L chest   Denies difficulty breathing      Pt states she is leaving town -- and has concerns about leaving town     Used propranolol as needed and helped quell symptoms slightly.    Recent Zio patch negative. Labs normal.     Review of Systems   Constitutional, HEENT, cardiovascular, pulmonary, GI, , musculoskeletal, neuro, skin, endocrine and psych systems are negative, except as otherwise noted.      Objective           Vitals:  No vitals were obtained today due to virtual visit.m Changed to PHONE VISIT    Physical  Exam   GENERAL: Healthy, alert and no distress    PSYCH: Mentation appears normal, affect normal/bright, judgement and insight intact, normal speech and appearance well-groomed.    Previous diagnostics negative for concerning findings.            Video-Visit Details    Changed to Telephone visit 15 minutes       DORINDA Bonilla     [address] [address]  [email or web address]   Office: [phone]

## 2021-01-29 NOTE — TELEPHONE ENCOUNTER
RN triage   Call from pt   Pt states she is having heart palpitations  Hs been having heart symptoms since November --   Had covid in December -- felt better from covid after 3 weeks   Had been to provider and ED and had heart ' work up'   Last seen on1/25 in clinic -- has had palpitations since Saturday --   Today feeling palpitations -- about 2 hrs ago palpitations lasted about 1/2 hr   None now   Per apple watch P = 78-85 --   Denies 'chest pain' -- but sometimes feels some sharpness L chest   Denies difficulty breathing     Pt states she is leaving town -- and has concerns about leaving town   Reviewed when to be seen urgently   Provider available for virtual visit today --   Transferred to      Angeline Zavala RN  [facility]  Triage Nurse Advisor    COVID 19 Nurse Triage Plan/Patient Instructions    Please be aware that novel coronavirus (COVID-19) may be circulating in the community. If you develop symptoms such as fever, cough, or SOB or if you have concerns about the presence of another infection including coronavirus (COVID-19), please contact your health care provider or visit [email or web address].     Disposition/Instructions    Virtual Visit with provider recommended. Reference Visit Selection Guide.    Thank you for taking steps to prevent the spread of this virus.  o Limit your contact with others.  o Wear a simple mask to cover your cough.  o Wash your hands well and often.    Resources    [facility]: About COVID-19: [email or web address]    CDC: What to Do If You're Sick: www.cdc.gov/coronavirus/2019-ncov/about/steps-when-sick.html    CDC: Ending Home Isolation: www.cdc.gov/coronavirus/2019-ncov/hcp/disposition-in-home-patients.html     CDC: Caring for Someone: www.cdc.gov/coronavirus/2019-ncov/if-you-are-sick/care-for-someone.html     [facility]: Interim Guidance for Hospital Discharge to Home: www.health.[facility].[place]/diseases/coronavirus/hcp/hospdischarge.pdf    [facility]  trials (COVID-19 research studies): [email or web address].[facility]/n-clinical-trials     Below are the COVID-19 hotlines at the [place] [facility] ([facility]). Interpreters are available.   o For health questions: Call 920-601-1910 or 1-556.119.3362 (7 a.m. to 7 p.m.)  o For questions about schools and childcare: Call 874-554-9634 or 1-559.818.7578 (7 a.m. to 7 p.m.)                         Additional Information    Negative: Passed out (i.e., fainted, collapsed and was not responding)    Negative: Shock suspected (e.g., cold/pale/clammy skin, too weak to stand, low BP, rapid pulse)    Negative: Difficult to awaken or acting confused (e.g., disoriented, slurred speech)    Negative: Visible sweat on face or sweat dripping down face    Negative: Unable to walk, or can only walk with assistance (e.g., requires support)    Negative: Received SHOCK from implantable cardiac defibrillator and has persisting symptoms (i.e., palpitations, lightheadedness)    Negative: Sounds like a life-threatening emergency to the triager    Negative: Chest pain    Negative: Difficulty breathing    Negative: Dizziness, lightheadedness, or weakness    Negative: Heart beating very rapidly (e.g., > 140 / minute) and present now (EXCEPTION: during exercise)    Negative: New or worsened shortness of breath with activity (dyspnea on exertion)    Negative: Patient sounds very sick or weak to the triager    Negative: Skipped or extra beat(s) and increases with exercise or exertion    Negative: History of heart disease (i.e., heart attack, bypass surgery, angina, angioplasty)    Negative: Age > 60 years    Negative: Taking water pill (i.e., diuretic) or heart medication (e.g., digoxin)    Patient wants to be seen    Protocols used: HEART RATE AND HEARTBEAT [id number]

## 2021-02-02 ENCOUNTER — MYC MEDICAL ADVICE (OUTPATIENT)
Dept: FAMILY MEDICINE | Facility: CLINIC | Age: 41
End: 2021-02-02

## 2021-02-03 ENCOUNTER — MYC MEDICAL ADVICE (OUTPATIENT)
Dept: FAMILY MEDICINE | Facility: CLINIC | Age: 41
End: 2021-02-03

## 2021-02-03 ENCOUNTER — PRE VISIT (OUTPATIENT)
Dept: CARDIOLOGY | Facility: CLINIC | Age: 41
End: 2021-02-03

## 2021-02-03 DIAGNOSIS — R00.2 PALPITATIONS: ICD-10-CM

## 2021-02-03 DIAGNOSIS — R06.02 SHORTNESS OF BREATH: ICD-10-CM

## 2021-02-03 DIAGNOSIS — R06.00 DYSPNEA, UNSPECIFIED TYPE: Primary | ICD-10-CM

## 2021-02-03 NOTE — TELEPHONE ENCOUNTER
Patient having continued shortness of breath, heart palpitations, chest pain and numbness and tingling in left arm to middle finger. These are intermittent symptoms. When the symptoms occur they are intense. Patient [place] in [place] one [place]. Lab work and imagine completed. Nothing found. Patient told she is healthy and sent home. Patient has had many ER visits since Aril 2020.  Patient was sick in March and believes she had covid at that time but was never tested. These symptoms occurred shortly after that illness.   Patient thinks the symptoms are related to her Nexplanon. Writer not aware of Nexplanon causing the reported symptoms.    Writer discussed long-hauler symptoms and discussed patient scheduling appt, with her PCP to discuss symptoms and getting referred to the post-covid clinic where patient can be evaluated by a team of doctors for her reported symptoms.   Patient will also go to OB to discuss possible removal of Nexplanon.     Patient in agreement with plan and verbalizes understanding.   Thanks!   Sarah Wilson RN

## 2021-02-03 NOTE — TELEPHONE ENCOUNTER
Please see my chart message below     Please review and advise     Thank you     Claire Roldan RN, BSN  [facility]

## 2021-02-04 NOTE — TELEPHONE ENCOUNTER
Called # [phone]     Pt noted she did have the Nexplanon taken out today at a reproductive clinic in [place].   Covid clinic referral placed.    Pt noted she is meeting with cardiology in a couple of days.    Barak Burch RN   [facility]

## 2021-02-04 NOTE — TELEPHONE ENCOUNTER
Agree with RN plan below. Previously had entered cardiology clinic referral and can enter COVID clinic referral if desired.    Lori Regalado, CNP

## 2021-02-09 ENCOUNTER — OFFICE VISIT (OUTPATIENT)
Dept: CARDIOLOGY | Facility: CLINIC | Age: 41
End: 2021-02-09
Attending: NURSE PRACTITIONER
Payer: COMMERCIAL

## 2021-02-09 VITALS
OXYGEN SATURATION: 98 % | HEART RATE: 94 BPM | SYSTOLIC BLOOD PRESSURE: 104 MMHG | BODY MASS INDEX: 24.04 KG/M2 | DIASTOLIC BLOOD PRESSURE: 62 MMHG | WEIGHT: 153.2 LBS | HEIGHT: 67 IN

## 2021-02-09 DIAGNOSIS — R06.02 SOB (SHORTNESS OF BREATH): ICD-10-CM

## 2021-02-09 DIAGNOSIS — R07.81 PLEURODYNIA: ICD-10-CM

## 2021-02-09 DIAGNOSIS — R00.2 PALPITATIONS: ICD-10-CM

## 2021-02-09 PROBLEM — G93.31 POSTVIRAL FATIGUE SYNDROME: Status: ACTIVE | Noted: 2021-02-09

## 2021-02-09 PROCEDURE — 99204 OFFICE O/P NEW MOD 45 MIN: CPT | Performed by: INTERNAL MEDICINE

## 2021-02-09 ASSESSMENT — MIFFLIN-ST. JEOR: SCORE: 1387.54

## 2021-02-09 NOTE — LETTER
2/9/2021      [facility]  [address] [address]      RE: Delicia Henry       Dear Colleague,    I had the pleasure of seeing Delicia Henry in the [facility].    Service Date: 02/09/2021      CLINIC VISIT       HISTORY OF PRESENT ILLNESS:  Delicia is a very nice, 40-year-old woman who is referred for chest discomfort and palpitations.      Delicia states that she was in her normal state of health until last March when she developed upper respiratory-type symptoms.  She states she had a cough.  She was short of breath.  She had fevers.  She was checked for COVID and was negative.  She states she felt poorly for 3-4 weeks, then recovered.  She states she did well through May-July, but then in August started feeling short of breath.  She also developed palpitations and irregular heartbeats.  She describes these as flip flops, skips and jumps.  They occur during quiet periods of time when sitting or lying in bed.  She did not notice any problems when she would work out.  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  She has no exertional chest, arm, neck, jaw or shoulder discomfort.  She states earlier in December she describes a sharp pain, worse with deep inspiration.  She states it lasted for about a week and has waxed and waned since that time and is essentially almost resolved.  She states she may have had an episode yesterday, but it was just a fleeting episode with a deep breath, an instant of pain, and then it resolved.  Of note, she was COVID negative last spring, but in December developed upper respiratory symptoms, the pleuritic chest pain as described above and was COVID positive.  She states the illness she had in December was very much different than the illness she had last March.      Review of the chart shows  evaluation for her shortness of breath included pulmonary function tests in September, which were normal, and a Zio patch in December, which she wore for 4 days demonstrating mostly sinus rhythm ranging from , average of 82 with isolated PVCs and PACs.  She states she did sense the palpitations and irregular heartbeats towards the end of her 4 day period, but was told there was nothing on her monitor.  Other evaluation included a CT scan in October demonstrating minor scarring in the right middle lobe, some tiny granulomas and no significant pathology.        An echocardiogram was performed in September, which demonstrated an ejection fraction of 60%-65%, no significant valvular pathology, normal inferior vena cava, no pericardial effusion and essentially a normal echocardiogram.      Delicia also notes that she had a birth control implant in her forearm, which she states she associated this with some of her shortness of breath and ultimately felt that this was probably causing some of her problems, and her Depo birth control was removed last week.      A final set of symptoms includes she gets a sensation of a head rush or a pressure sensation.  It is not associated with headaches.  She thinks it dates back to August.  She can get a tingling sensation in her face and arm and a tight feeling in her neck.  She has been told this is anxiety, although she states she has never had anxiety before in her life.  At one point in time, she states she had severe symptoms with chest pressure and sought medical attention, where troponins were normal and where her EKGs from January, December and last July were all normal.      She had, as stated, leadless monitoring done in July and again in December.  Many of the episodes were associated with normal sinus rhythm, although 13 of the episodes were also associated with PVCs.      ASSESSMENT AND PLAN:  I think Delicia is not having any anginal symptoms.  Her chest pain  syndrome sounds very pleuritic and/or secondary to stress.  I do not think we need to pursue any stress testing at this time, as she states she can work out to high workloads without any symptoms of shortness of breath, chest discomfort or palpitations.      We talked about the fact that the palpitations may not be any significant pathology, but clearly the PVCs and PACs are probably what are triggering her palpitations, and now what is causing that.  We talked about what PACs and PVCs are.  Clearly, disrupted sleep at nighttime can be contributing, stress and anxiety.  We talked about alcohol and caffeinated beverages.  She states she has cut out all caffeine.  I am also curious as to whether her Depo birth control may be contributing.  Clearly, progesterone can cause a sensation of shortness of breath, and I have told her we will see over the next couple of weeks if her sensation of dyspnea despite a normal chest CT, pulmonary function tests and echocardiogram may be just a physiologic sensation of shortness of breath.  Obviously, a lot of these things can feed into anxiety, which may also be contributing.  I have reassured her that her heart structurally looks fine, that PACs and PVCs are not pathological and that I think her heart is doing fine.      She talks about long COVID.  She just recently had her COVID infection.  For the most part, it appears that she has recovered adequately.  Obviously, if she starts having symptoms or continues to have symptoms, some of these tests can be repeated because many of them were performed before she actually had her COVID infection.  At this time, I have recommended that she exercise regularly, practice good sleep hygiene, eat a diet high in fresh fruits and vegetables, avoid caffeinated beverages and alcohol and follow up with my QUAN in 2-4 weeks, and we will decide then if any further cardiac evaluation is necessary.      Her pleuritic pain has resolved, so I do not  think we need to be concerned about pulmonary emboli, but clearly this could have contributed to her pleuritic chest pain and shortness of breath during her COVID infection.      Thank you for allowing me to participate in her care.         CHASE NICOLE MD, [facility]         D: 2021   T: 2021   MT: susana      Name:     SETH HAYWOOD   MRN:      [id number]        Account:      [id number]   :      1980           Service Date: 2021      Document: [id number]        Outpatient Encounter Medications as of 2021   Medication Sig Dispense Refill     albuterol (PROAIR HFA/PROVENTIL HFA/VENTOLIN HFA) 108 (90 Base) MCG/ACT inhaler Inhale 2 puffs into the lungs every 6 hours as needed for shortness of breath / dyspnea or wheezing FOR SHORTNESS OF BREATH OR DIFFICULT BREATHING OR WHEEZING 6.7 g 0     cyclobenzaprine (FLEXERIL) 5 MG tablet Take 1 tablet (5 mg) by mouth 3 times daily as needed for muscle spasms 30 tablet 1     propranolol (INDERAL) 10 MG tablet Take 1 tablet (10 mg) by mouth 3 times daily as needed (anxiety) 90 tablet 1     No facility-administered encounter medications on file as of 2021.      Again, thank you for allowing me to participate in the care of your patient.      Sincerely,    Chase Nicole MD     [facility]    cc:   Lori Regalado, CNP  [address] [place] [address]

## 2021-02-09 NOTE — PROGRESS NOTES
Service Date: 02/09/2021      CLINIC VISIT       HISTORY OF PRESENT ILLNESS:  Delicia is a very nice, 40-year-old woman who is referred for chest discomfort and palpitations.      Delicia states that she was in her normal state of health until last March when she developed upper respiratory-type symptoms.  She states she had a cough.  She was short of breath.  She had fevers.  She was checked for COVID and was negative.  She states she felt poorly for 3-4 weeks, then recovered.  She states she did well through May-July, but then in August started feeling short of breath.  She also developed palpitations and irregular heartbeats.  She describes these as flip flops, skips and jumps.  They occur during quiet periods of time when sitting or lying in bed.  She did not notice any problems when she would work out.  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  She has no exertional chest, arm, neck, jaw or shoulder discomfort.  She states earlier in December she describes a sharp pain, worse with deep inspiration.  She states it lasted for about a week and has waxed and waned since that time and is essentially almost resolved.  She states she may have had an episode yesterday, but it was just a fleeting episode with a deep breath, an instant of pain, and then it resolved.  Of note, she was COVID negative last spring, but in December developed upper respiratory symptoms, the pleuritic chest pain as described above and was COVID positive.  She states the illness she had in December was very much different than the illness she had last March.      Review of the chart shows evaluation for her shortness of breath included pulmonary function tests in September, which were normal, and a Zio patch in December, which she wore for 4 days demonstrating mostly sinus rhythm ranging from , average of 82 with isolated PVCs and PACs.  She states she did  sense the palpitations and irregular heartbeats towards the end of her 4 day period, but was told there was nothing on her monitor.  Other evaluation included a CT scan in October demonstrating minor scarring in the right middle lobe, some tiny granulomas and no significant pathology.        An echocardiogram was performed in September, which demonstrated an ejection fraction of 60%-65%, no significant valvular pathology, normal inferior vena cava, no pericardial effusion and essentially a normal echocardiogram.      Delicia also notes that she had a birth control implant in her forearm, which she states she associated this with some of her shortness of breath and ultimately felt that this was probably causing some of her problems, and her Depo birth control was removed last week.      A final set of symptoms includes she gets a sensation of a head rush or a pressure sensation.  It is not associated with headaches.  She thinks it dates back to August.  She can get a tingling sensation in her face and arm and a tight feeling in her neck.  She has been told this is anxiety, although she states she has never had anxiety before in her life.  At one point in time, she states she had severe symptoms with chest pressure and sought medical attention, where troponins were normal and where her EKGs from January, December and last July were all normal.      She had, as stated, leadless monitoring done in July and again in December.  Many of the episodes were associated with normal sinus rhythm, although 13 of the episodes were also associated with PVCs.      ASSESSMENT AND PLAN:  I think Delicia is not having any anginal symptoms.  Her chest pain syndrome sounds very pleuritic and/or secondary to stress.  I do not think we need to pursue any stress testing at this time, as she states she can work out to high workloads without any symptoms of shortness of breath, chest discomfort or palpitations.      We talked about the fact  that the palpitations may not be any significant pathology, but clearly the PVCs and PACs are probably what are triggering her palpitations, and now what is causing that.  We talked about what PACs and PVCs are.  Clearly, disrupted sleep at nighttime can be contributing, stress and anxiety.  We talked about alcohol and caffeinated beverages.  She states she has cut out all caffeine.  I am also curious as to whether her Depo birth control may be contributing.  Clearly, progesterone can cause a sensation of shortness of breath, and I have told her we will see over the next couple of weeks if her sensation of dyspnea despite a normal chest CT, pulmonary function tests and echocardiogram may be just a physiologic sensation of shortness of breath.  Obviously, a lot of these things can feed into anxiety, which may also be contributing.  I have reassured her that her heart structurally looks fine, that PACs and PVCs are not pathological and that I think her heart is doing fine.      She talks about long COVID.  She just recently had her COVID infection.  For the most part, it appears that she has recovered adequately.  Obviously, if she starts having symptoms or continues to have symptoms, some of these tests can be repeated because many of them were performed before she actually had her COVID infection.  At this time, I have recommended that she exercise regularly, practice good sleep hygiene, eat a diet high in fresh fruits and vegetables, avoid caffeinated beverages and alcohol and follow up with my QUAN in 2-4 weeks, and we will decide then if any further cardiac evaluation is necessary.      Her pleuritic pain has resolved, so I do not think we need to be concerned about pulmonary emboli, but clearly this could have contributed to her pleuritic chest pain and shortness of breath during her COVID infection.      Thank you for allowing me to participate in her care.         ADITI NICOLE MD, FACC             D:  2021   T: 2021   MT: susaan      Name:     SETH HAYWOOD   MRN:      [id number]        Account:      [id number]   :      1980           Service Date: 2021      Document: [id number]

## 2021-02-09 NOTE — PROGRESS NOTES
HPI and Plan:   See dictation    Orders Placed This Encounter   Procedures     Follow-Up with Cardiac Advanced Practice Provider       No orders of the defined types were placed in this encounter.      There are no discontinued medications.      Encounter Diagnoses   Name Primary?     Palpitations      Pleurodynia      SOB (shortness of breath)        CURRENT MEDICATIONS:  Current Outpatient Medications   Medication Sig Dispense Refill     albuterol (PROAIR HFA/PROVENTIL HFA/VENTOLIN HFA) 108 (90 Base) MCG/ACT inhaler Inhale 2 puffs into the lungs every 6 hours as needed for shortness of breath / dyspnea or wheezing FOR SHORTNESS OF BREATH OR DIFFICULT BREATHING OR WHEEZING 6.7 g 0     cyclobenzaprine (FLEXERIL) 5 MG tablet Take 1 tablet (5 mg) by mouth 3 times daily as needed for muscle spasms 30 tablet 1     propranolol (INDERAL) 10 MG tablet Take 1 tablet (10 mg) by mouth 3 times daily as needed (anxiety) 90 tablet 1       ALLERGIES     Allergies   Allergen Reactions     Avelox Swelling     Swelling tongue, dizzy, itching     Erythromycin Nausea     Sulfa Drugs      Unknown reaction       PAST MEDICAL HISTORY:  Past Medical History:   Diagnosis Date     Asthma     exercise induced asthma     MEDICAL HISTORY OF -     recurrent uti's urethral dilitation , tigonitis.     Molar pregnancy      Palpitations 2/3/2021       PAST SURGICAL HISTORY:  Past Surgical History:   Procedure Laterality Date     DILATION AND CURETTAGE SUCTION  2/13/2012    Procedure:DILATION AND CURETTAGE SUCTION; DILATION AND CURETTAGE SUCTION ; Surgeon:LEIGHTON GALLAGHER; Location:[facility]     ENT SURGERY      tumor rem under ear, benign       FAMILY HISTORY:  Family History   Problem Relation Age of Onset     Allergies Father         seasonal     Allergies Sister         seasonal     Thyroid Disease Sister      Thyroid Disease Mother      Diabetes Paternal Grandfather        SOCIAL HISTORY:  Social History     Socioeconomic History     Marital  status:      Spouse name: Tyler     Number of children: 4     Years of education: None     Highest education level: None   Occupational History     Occupation: [employer] - [place]     Comment: Owner   Social Needs     Financial resource strain: None     Food insecurity     Worry: None     Inability: None     Transportation needs     Medical: None     Non-medical: None   Tobacco Use     Smoking status: Never Smoker     Smokeless tobacco: Never Used   Substance and Sexual Activity     Alcohol use: No     Drug use: No     Sexual activity: Yes     Partners: Male     Birth control/protection: Injection   Lifestyle     Physical activity     Days per week: None     Minutes per session: None     Stress: None   Relationships     Social connections     Talks on phone: None     Gets together: None     Attends Catholic service: None     Active member of club or organization: None     Attends meetings of clubs or organizations: None     Relationship status: None     Intimate partner violence     Fear of current or ex partner: None     Emotionally abused: None     Physically abused: None     Forced sexual activity: None   Other Topics Concern     Parent/sibling w/ CABG, MI or angioplasty before 65F 55M? Not Asked   Social History Narrative    Patient has 4 kids (21, 17, 6, 2) and  with a [employer] studio. Used to work in the airline industry.       Review of Systems:  Skin:  Negative       Eyes:  Negative      ENT:  Negative      Respiratory:  Positive for   faster and short breaths during episodes of chest tightness and palpitations and pressure i head and neck   Cardiovascular:    palpitations;Positive for;heaviness chest tightness,  Gastroenterology: Negative      Genitourinary:  Negative      Musculoskeletal:  Negative      Neurologic:  Positive for numbness or tingling of hands left arm from into shoulder and down into the hand (had birth control implant in left arm-now removed)  Psychiatric:  Negative     "  Heme/Lymph/Imm:  Positive for allergies seasonal  Endocrine:  Negative        Physical Exam:  Vitals: /62   Pulse 94   Ht 1.702 m (5' 7\")   Wt 69.5 kg (153 lb 3.2 oz)   SpO2 98%   BMI 23.99 kg/m      Constitutional:  cooperative, alert and oriented, well developed, well nourished, in no acute distress        Skin:  warm and dry to the touch, no apparent skin lesions or masses noted          Head:  normocephalic, no masses or lesions        Eyes:  pupils equal and round, conjunctivae and lids unremarkable, sclera white, no xanthalasma, EOMS intact, no nystagmus        Lymph:      ENT:  no pallor or cyanosis, dentition good        Neck:  carotid pulses are full and equal bilaterally;no carotid bruit        Respiratory:  normal breath sounds, clear to auscultation, normal A-P diameter, normal symmetry, normal respiratory excursion, no use of accessory muscles         Cardiac:                                                           GI:           Extremities and Muscular Skeletal:  no spinal abnormalities noted;normal muscle strength and tone              Neurological:  no gross motor deficits        Psych:  affect appropriate, oriented to time, person and place Anxious      CC  Lori Regalado, CNP  [address] [place] [address]              "

## 2021-02-09 NOTE — LETTER
2/9/2021    [facility]  [address] [address]    RE: Delicia Henry       Dear Colleague,    I had the pleasure of seeing Delicia Henry in the [facility].    HPI and Plan:   See dictation    Orders Placed This Encounter   Procedures     Follow-Up with Cardiac Advanced Practice Provider       No orders of the defined types were placed in this encounter.      There are no discontinued medications.      Encounter Diagnoses   Name Primary?     Palpitations      Pleurodynia      SOB (shortness of breath)        CURRENT MEDICATIONS:  Current Outpatient Medications   Medication Sig Dispense Refill     albuterol (PROAIR HFA/PROVENTIL HFA/VENTOLIN HFA) 108 (90 Base) MCG/ACT inhaler Inhale 2 puffs into the lungs every 6 hours as needed for shortness of breath / dyspnea or wheezing FOR SHORTNESS OF BREATH OR DIFFICULT BREATHING OR WHEEZING 6.7 g 0     cyclobenzaprine (FLEXERIL) 5 MG tablet Take 1 tablet (5 mg) by mouth 3 times daily as needed for muscle spasms 30 tablet 1     propranolol (INDERAL) 10 MG tablet Take 1 tablet (10 mg) by mouth 3 times daily as needed (anxiety) 90 tablet 1       ALLERGIES     Allergies   Allergen Reactions     Avelox Swelling     Swelling tongue, dizzy, itching     Erythromycin Nausea     Sulfa Drugs      Unknown reaction       PAST MEDICAL HISTORY:  Past Medical History:   Diagnosis Date     Asthma     exercise induced asthma     MEDICAL HISTORY OF -     recurrent uti's urethral dilitation , tigonitis.     Molar pregnancy      Palpitations 2/3/2021       PAST SURGICAL HISTORY:  Past Surgical History:   Procedure Laterality Date     DILATION AND CURETTAGE SUCTION  2/13/2012    Procedure:DILATION AND CURETTAGE SUCTION; DILATION AND CURETTAGE SUCTION ; Surgeon:LEIGHTON GALLAGHER; Location:[facility] OR     ENT SURGERY      tumor rem under ear, benign       FAMILY HISTORY:  Family History    Problem Relation Age of Onset     Allergies Father         seasonal     Allergies Sister         seasonal     Thyroid Disease Sister      Thyroid Disease Mother      Diabetes Paternal Grandfather        SOCIAL HISTORY:  Social History     Socioeconomic History     Marital status:      Spouse name: Tyler     Number of children: 4     Years of education: None     Highest education level: None   Occupational History     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   Social Needs     Financial resource strain: None     Food insecurity     Worry: None     Inability: None     Transportation needs     Medical: None     Non-medical: None   Tobacco Use     Smoking status: Never Smoker     Smokeless tobacco: Never Used   Substance and Sexual Activity     Alcohol use: No     Drug use: No     Sexual activity: Yes     Partners: Male     Birth control/protection: Injection   Lifestyle     Physical activity     Days per week: None     Minutes per session: None     Stress: None   Relationships     Social connections     Talks on phone: None     Gets together: None     Attends Yazidism service: None     Active member of club or organization: None     Attends meetings of clubs or organizations: None     Relationship status: None     Intimate partner violence     Fear of current or ex partner: None     Emotionally abused: None     Physically abused: None     Forced sexual activity: None   Other Topics Concern     Parent/sibling w/ CABG, MI or angioplasty before 65F 55M? Not Asked   Social History Narrative    Patient has 4 kids (21, 17, 6, 2) and  with a [employer] studio. Used to work in the airline industry.       Review of Systems:  Skin:  Negative       Eyes:  Negative      ENT:  Negative      Respiratory:  Positive for   faster and short breaths during episodes of chest tightness and palpitations and pressure i head and neck   Cardiovascular:    palpitations;Positive for;heaviness chest  "tightness,  Gastroenterology: Negative      Genitourinary:  Negative      Musculoskeletal:  Negative      Neurologic:  Positive for numbness or tingling of hands left arm from into shoulder and down into the hand (had birth control implant in left arm-now removed)  Psychiatric:  Negative      Heme/Lymph/Imm:  Positive for allergies seasonal  Endocrine:  Negative        Physical Exam:  Vitals: /62   Pulse 94   Ht 1.702 m (5' 7\")   Wt 69.5 kg (153 lb 3.2 oz)   SpO2 98%   BMI 23.99 kg/m      Constitutional:  cooperative, alert and oriented, well developed, well nourished, in no acute distress        Skin:  warm and dry to the touch, no apparent skin lesions or masses noted          Head:  normocephalic, no masses or lesions        Eyes:  pupils equal and round, conjunctivae and lids unremarkable, sclera white, no xanthalasma, EOMS intact, no nystagmus        Lymph:      ENT:  no pallor or cyanosis, dentition good        Neck:  carotid pulses are full and equal bilaterally;no carotid bruit        Respiratory:  normal breath sounds, clear to auscultation, normal A-P diameter, normal symmetry, normal respiratory excursion, no use of accessory muscles         Cardiac:                                                           GI:           Extremities and Muscular Skeletal:  no spinal abnormalities noted;normal muscle strength and tone              Neurological:  no gross motor deficits        Psych:  affect appropriate, oriented to time, person and place Anxious      CC  Lori Regalado, JUAN  [address] [id number]                  Thank you for allowing me to participate in the care of your patient.      Sincerely,     Chase Orourke MD     [facility]  cc:   Lori Regalado, CNP  [address] [address]        "

## 2021-02-15 DIAGNOSIS — F41.9 ANXIETY: ICD-10-CM

## 2021-02-15 DIAGNOSIS — R06.00 DYSPNEA, UNSPECIFIED TYPE: ICD-10-CM

## 2021-02-15 RX ORDER — PROPRANOLOL HYDROCHLORIDE 10 MG/1
TABLET ORAL
Qty: 90 TABLET | Refills: 1 | Status: SHIPPED | OUTPATIENT
Start: 2021-02-15 | End: 2021-04-15

## 2021-03-20 ENCOUNTER — HEALTH MAINTENANCE LETTER (OUTPATIENT)
Age: 41
End: 2021-03-20

## 2021-04-15 ENCOUNTER — OFFICE VISIT (OUTPATIENT)
Dept: OBGYN | Facility: CLINIC | Age: 41
End: 2021-04-15
Payer: COMMERCIAL

## 2021-04-15 VITALS — DIASTOLIC BLOOD PRESSURE: 70 MMHG | BODY MASS INDEX: 23.65 KG/M2 | SYSTOLIC BLOOD PRESSURE: 102 MMHG | WEIGHT: 151 LBS

## 2021-04-15 DIAGNOSIS — Z30.09 GENERAL COUNSELING FOR PRESCRIPTION OF ORAL CONTRACEPTIVES: Primary | ICD-10-CM

## 2021-04-15 PROCEDURE — 99203 OFFICE O/P NEW LOW 30 MIN: CPT | Performed by: OBSTETRICS & GYNECOLOGY

## 2021-04-15 NOTE — PROGRESS NOTES
HPI:  Delicia Henry is a 40 year old female  No LMP recorded. Patient has had an implant.  In the past and did not like it because she had anxiety difficulty with breathing hair loss and chest pain.  The symptoms resolved within a month of removing it.  At present she is not using anything for contraception, who presents for discussion regarding contraceptive alternatives.  Patient states that she been on Depo-Provera for 10 years and liked it a lot but did experience a weight gain.  In her younger years she was on an oral contraceptive pill for a brief period of time had trouble remembering to take it on a daily basis.  Patient and her family operate on [employer] and she would prefer to not have a  Menses from a hygiene standpoint.  We had a lengthy discussion today regarding the risk benefits and alternative forms of contraceptive modalities.  The patient is amenable to a trial of a Mirena IUD.  She was given patient education information and I think she has a good understanding.    Past Medical History:   Diagnosis Date     Asthma     exercise induced asthma     MEDICAL HISTORY OF -     recurrent uti's urethral dilitation , tigonitis.     Molar pregnancy      Palpitations 2/3/2021     Past Surgical History:   Procedure Laterality Date     DILATION AND CURETTAGE SUCTION  2012    Procedure:DILATION AND CURETTAGE SUCTION; DILATION AND CURETTAGE SUCTION ; Surgeon:LEIGHTON GALLAGHER; Location:[facility]     ENT SURGERY      tumor rem under ear, benign     Family History   Problem Relation Age of Onset     Allergies Father         seasonal     Allergies Sister         seasonal     Thyroid Disease Sister      Thyroid Disease Mother      Diabetes Paternal Grandfather      Social History     Socioeconomic History     Marital status:      Spouse name: Tyler     Number of children: 4     Years of education: Not on file     Highest education level: Not on file   Occupational History      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   Social Needs     Financial resource strain: Not on file     Food insecurity     Worry: Not on file     Inability: Not on file     Transportation needs     Medical: Not on file     Non-medical: Not on file   Tobacco Use     Smoking status: Never Smoker     Smokeless tobacco: Never Used   Substance and Sexual Activity     Alcohol use: No     Drug use: No     Sexual activity: Yes     Partners: Male     Birth control/protection: Injection   Lifestyle     Physical activity     Days per week: Not on file     Minutes per session: Not on file     Stress: Not on file   Relationships     Social connections     Talks on phone: Not on file     Gets together: Not on file     Attends Amish service: Not on file     Active member of club or organization: Not on file     Attends meetings of clubs or organizations: Not on file     Relationship status: Not on file     Intimate partner violence     Fear of current or ex partner: Not on file     Emotionally abused: Not on file     Physically abused: Not on file     Forced sexual activity: Not on file   Other Topics Concern     Parent/sibling w/ CABG, MI or angioplasty before 65F 55M? Not Asked   Social History Narrative    Patient has 4 kids (21, 17, 6, 2) and  with a [employer] studio. Used to work in the airline industry.       Allergies:  Avelox, Erythromycin, and Sulfa drugs    No current outpatient medications on file.       Review Of Systems   ROS: 10 point ROS neg other than the symptoms noted above in the HPI.    Exam:  /70   Wt 68.5 kg (151 lb)   BMI 23.65 kg/m    {Constitutional: healthy, alert and no distress    (Z30.09) General counseling for prescription of oral contraceptives  (primary encounter diagnosis)  Comment: Risks, benefits, and alternative modes of therapy discussed at length. Pathophysiology of the disease process reviewed, all of the patients questions answered and informed consent  obtained.    Plan: The patient is going to come back with the onset of her menses for Mirena IUD placement.  She will use 4 to 600 mg of ibuprofen an hour or 2 prior.  She is going to either abstain or use a barrier form of contraception in the interval  15 minutes spent greater than 50% in counseling        Gordon Baeza M.D.

## 2021-04-15 NOTE — PATIENT INSTRUCTIONS
You can reach your [place] Care Team any time of the day by calling [phone]. This number will put you in touch with the 24 hour nurse line if the clinic is closed.    To contact your OB/GYN Station Coordinator/Surgery Scheduler please call [phone]. This is a direct number for your care team between 8 a.m. and 4 p.m. Monday through Friday.    [facility] is open for your convenience:  Monday through Friday 8 a.m. to 6 p.m.  Closed weekends and all major holidays.

## 2021-04-15 NOTE — NURSING NOTE
"Chief Complaint   Patient presents with     Contraception       Initial /70   Wt 68.5 kg (151 lb)   BMI 23.65 kg/m   Estimated body mass index is 23.65 kg/m  as calculated from the following:    Height as of 21: 1.702 m (5' 7\").    Weight as of this encounter: 68.5 kg (151 lb).  BP completed using cuff size: regular    Questioned patient about current smoking habits.  Pt. has never smoked.          The following HM Due: NONE      The following patient reported/Care Every where data was sent to:  P ABSTRACT QUALITY INITIATIVES [[id number]]        Tawnya Landon, [facility]                 "

## 2021-04-29 ENCOUNTER — OFFICE VISIT (OUTPATIENT)
Dept: OBGYN | Facility: CLINIC | Age: 41
End: 2021-04-29
Payer: COMMERCIAL

## 2021-04-29 VITALS — DIASTOLIC BLOOD PRESSURE: 76 MMHG | BODY MASS INDEX: 23.18 KG/M2 | SYSTOLIC BLOOD PRESSURE: 110 MMHG | WEIGHT: 148 LBS

## 2021-04-29 DIAGNOSIS — Z30.430 ENCOUNTER FOR INSERTION OF INTRAUTERINE CONTRACEPTIVE DEVICE: ICD-10-CM

## 2021-04-29 DIAGNOSIS — Z30.430 ENCOUNTER FOR IUD INSERTION: Primary | ICD-10-CM

## 2021-04-29 LAB — HCG UR QL: NEGATIVE

## 2021-04-29 PROCEDURE — 58300 INSERT INTRAUTERINE DEVICE: CPT | Performed by: OBSTETRICS & GYNECOLOGY

## 2021-04-29 PROCEDURE — 81025 URINE PREGNANCY TEST: CPT | Performed by: OBSTETRICS & GYNECOLOGY

## 2021-04-29 NOTE — PROGRESS NOTES
Delicia Henry is a 40 year old female  No LMP recorded. Patient has had an implant.  In the past and did not like it because she had anxiety difficulty with breathing hair loss and chest pain.  The symptoms resolved within a month of removing it.  At present she is not using anything for contraception, who presents for discussion regarding contraceptive alternatives.  Patient states that she been on Depo-Provera for 10 years and liked it a lot but did experience a weight gain.  In her younger years she was on an oral contraceptive pill for a brief period of time had trouble remembering to take it on a daily basis.  Patient and her family operate on [employer] and she would prefer to not have a  Menses from a hygiene standpoint.  We had a lengthy discussion today regarding the risk benefits and alternative forms of contraceptive modalities.  The patient is amenable to a trial of a Mirena IUD.  She was given patient education information and I think she has a good understanding.  Patient presents today for placement of Mirena IUD.  The patient is presently menstruating and her urine pregnancy test today is negative.  Risks, benefits, and alternative modes of therapy discussed at length. Pathophysiology of the disease process reviewed, all of the patients questions answered and informed consent obtained.    Past Medical History:   Diagnosis Date     Asthma     exercise induced asthma     MEDICAL HISTORY OF -     recurrent uti's urethral dilitation , tigonitis.     Molar pregnancy      Palpitations 2/3/2021     Current Outpatient Medications   Medication     levonorgestrel (MIRENA) 20 MCG/24HR IUD     No current facility-administered medications for this visit.      /76   Wt 67.1 kg (148 lb)   BMI 23.18 kg/m    Constitutional: healthy, alert and no distress  Genitourinary: Normal external genitalia without lesions and bimanual exam the uterus is parous smooth firm anteflexed mobile  adnexa without masses enlargement or tenderness.  After obtaining informed consent an exam was done, , the pt prepped in the usual sterile fashion, the anterior lip of the cervix grasped w a tenaculum the uterus sounded to 8 cm and a mirena IUD placed without concerns or complications, the string trimmed to approx 1 inch from the os and the speculum removed.  The pt was instructed as to the signs of complications as well as how to check her string.  She will RTC after her next menses for an U/S to confirm proper positioning or prn concerns.    (Z30.430) Encounter for IUD insertion  (primary encounter diagnosis)  Comment: As above  Plan: HCG Qual, Urine ([id number]), levonorgestrel         (MIRENA) 20 MCG/24HR IUD, levonorgestrel         (MIRENA) 20 MCG/24HR IUD 20 mcg, INSERTION         INTRAUTERINE DEVICE, US Pelvic Complete with         Transvaginal        Plan reviewed with the patient she understands and accepts    (Z30.430) Encounter for insertion of intrauterine contraceptive device  Comment: As above  Plan: levonorgestrel (MIRENA) 20 MCG/24HR IUD,         levonorgestrel (MIRENA) 20 MCG/24HR IUD 20 mcg,        INSERTION INTRAUTERINE DEVICE, US Pelvic         Complete with Transvaginal        Future follow-up ultrasound order placed

## 2021-04-29 NOTE — PATIENT INSTRUCTIONS
You can reach your [place] Care Team any time of the day by calling [phone]. This number will put you in touch with the 24 hour nurse line if the clinic is closed.    To contact your OB/GYN Station Coordinator/Surgery Scheduler please call [phone]. This is a direct number for your care team between 8 a.m. and 4 p.m. Monday through Friday.    [facility] is open for your convenience:  Monday through Friday 8 a.m. to 6 p.m.  Closed weekends and all major holidays.

## 2021-05-28 ENCOUNTER — HOSPITAL ENCOUNTER (OUTPATIENT)
Dept: ULTRASOUND IMAGING | Facility: CLINIC | Age: 41
Discharge: HOME | End: 2021-05-28
Attending: OBSTETRICS & GYNECOLOGY | Admitting: OBSTETRICS & GYNECOLOGY
Payer: COMMERCIAL

## 2021-05-28 DIAGNOSIS — Z30.430 ENCOUNTER FOR INSERTION OF INTRAUTERINE CONTRACEPTIVE DEVICE: ICD-10-CM

## 2021-05-28 DIAGNOSIS — Z30.430 ENCOUNTER FOR IUD INSERTION: ICD-10-CM

## 2021-05-28 PROCEDURE — 76830 TRANSVAGINAL US NON-OB: CPT

## 2021-06-18 ENCOUNTER — MYC MEDICAL ADVICE (OUTPATIENT)
Dept: OBGYN | Facility: CLINIC | Age: 41
End: 2021-06-18

## 2021-06-18 DIAGNOSIS — N89.8 VAGINAL DISCHARGE: Primary | ICD-10-CM

## 2021-06-18 RX ORDER — METRONIDAZOLE 7.5 MG/G
1 GEL VAGINAL DAILY
Qty: 70 G | Refills: 0 | Status: SHIPPED | OUTPATIENT
Start: 2021-06-18 | End: 2021-08-05

## 2021-06-19 NOTE — TELEPHONE ENCOUNTER
Pt notes a yellowish malodorous vaginal discharge  Will Rx with Metrogel 1 applicator intravaginally at HS for 5 nights or until the tube is gone  Pt to call if no improvement/resolution  Please notify the pt  Gordon Baeza M.D.

## 2021-08-04 ENCOUNTER — MYC MEDICAL ADVICE (OUTPATIENT)
Dept: OBGYN | Facility: CLINIC | Age: 41
End: 2021-08-04

## 2021-08-05 ENCOUNTER — OFFICE VISIT (OUTPATIENT)
Dept: OBGYN | Facility: CLINIC | Age: 41
End: 2021-08-05
Payer: COMMERCIAL

## 2021-08-05 VITALS — DIASTOLIC BLOOD PRESSURE: 60 MMHG | BODY MASS INDEX: 22.71 KG/M2 | SYSTOLIC BLOOD PRESSURE: 102 MMHG | WEIGHT: 145 LBS

## 2021-08-05 DIAGNOSIS — Z30.011 ENCOUNTER FOR INITIAL PRESCRIPTION OF CONTRACEPTIVE PILLS: Primary | ICD-10-CM

## 2021-08-05 DIAGNOSIS — Z71.89 COUNSELED ABOUT COVID-19 VIRUS INFECTION: ICD-10-CM

## 2021-08-05 PROCEDURE — 99214 OFFICE O/P EST MOD 30 MIN: CPT | Performed by: OBSTETRICS & GYNECOLOGY

## 2021-08-05 RX ORDER — NORGESTIMATE AND ETHINYL ESTRADIOL 7DAYSX3 LO
1 KIT ORAL DAILY
Qty: 84 TABLET | Refills: 3 | Status: SHIPPED | OUTPATIENT
Start: 2021-08-05 | End: 2022-06-20

## 2021-08-05 NOTE — NURSING NOTE
"Chief Complaint   Patient presents with     IUD     removal       Initial /60   Wt 65.8 kg (145 lb)   BMI 22.71 kg/m   Estimated body mass index is 22.71 kg/m  as calculated from the following:    Height as of 21: 1.702 m (5' 7\").    Weight as of this encounter: 65.8 kg (145 lb).  BP completed using cuff size: regular    Questioned patient about current smoking habits.  Pt. has never smoked.          The following HM Due: NONE      The following patient reported/Care Every where data was sent to:  P ABSTRACT QUALITY INITIATIVES [[id number]]        Tawnya Landon CMA                 "

## 2021-08-05 NOTE — TELEPHONE ENCOUNTER
Please see my chart message regarding possible side effects to the Mirena IUD that was placed on 4/29/21.  Rosie Sebastian RN

## 2021-08-05 NOTE — TELEPHONE ENCOUNTER
Pt calls regarding the below sx of heart palpitations, neck/head pressure, dizziness.     She states she had similar sx when she was on nexplanon.    The sx have came almost every day for the past 2 weeks.  Some vision disturbance, but very slight. Not painful HAs, just a pressure in her head.     Scheduled for appt for IUD removal after discussion with Dr Baeza. However, he does feel these sx are not related to birth control and suggests seeing IM.    Pari WEISS R.N.

## 2021-08-05 NOTE — PATIENT INSTRUCTIONS
You can reach your [place] Care Team any time of the day by calling [phone]. This number will put you in touch with the 24 hour nurse line if the clinic is closed.    To contact your OB/GYN Station Coordinator/Surgery Scheduler please call [phone]. This is a direct number for your care team between 8 a.m. and 4 p.m. Monday through Friday.    [facility] is open for your convenience:  Monday through Friday 8 a.m. to 6 p.m.  Closed weekends and all major holidays.

## 2021-08-06 NOTE — PROGRESS NOTES
HPI:  Delicia Henry is a 40 year old white female  No LMP recorded. (Menstrual status: IUD).  Who had a Mirena IUD placed for contraception on 2021 with proper position confirmed by ultrasound 1 month later, who presents for evaluation of symptoms consisting of an increased neck and head pressure that she compares to the descending in an airplane that waxes and wanes that were similar to symptoms that she had while she had a Nexplanon placed.  She feels like the similar symptoms are now starting with her Mirena IUD.  See the previous note regarding her symptoms regarding her Nexplanon.  She denies any cardiovascular symptoms palpitations or absolute contraindications to Mirena IUD.  She has had intermittent spotting with the Mirena in place.  She states that her previously noted symptoms resolved with removal of the Nexplanon and the patient is asking today to have her Mirena IUD removed.  I discussed with her the risk benefits and alternatives of doing this we discussed the need for adequate contraception.  I discussed contraceptive options including permanent sterilization but the patient does not want to be pregnant again.  One of the items that were appealing with a Mirena IUD was the potential for amenorrhea.  We discussed narrowing vasectomy for her  also endometrial ablation with bilateral salpingectomy.  The patient at this time would like to instead use OCPs.  She has no absolute contraindications to oral contraceptive pills.   Instrucitons and indications for backup were thoroughly rev.  All her ?'s were answered.  I reviewed at length the issues I discussed with her at her earlier visits in 2021.  The patient has a good understanding  The patient's not had a COVID-19 vaccination nor have members of her family.  The patient had questions on Covid vaccination and I had a lengthy discussion with her regarding this.  Encouraged her to consider vaccination for both herself  and her family.  The patient states that she thinks that she has had Covid 19 in the past    Past Medical History:   Diagnosis Date     Asthma     exercise induced asthma     MEDICAL HISTORY OF -     recurrent uti's urethral dilitation , tigonitis.     Molar pregnancy      Palpitations 2/3/2021     Current Outpatient Medications   Medication     norgestim-eth estrad triphasic (ORTHO TRI-CYCLEN LO) 0.18/0.215/0.25 MG-25 MCG tablet     No current facility-administered medications for this visit.     /60   Wt 65.8 kg (145 lb)   BMI 22.71 kg/m        Past Medical History:   Diagnosis Date     Asthma     exercise induced asthma     MEDICAL HISTORY OF -     recurrent uti's urethral dilitation , tigonitis.     Molar pregnancy      Palpitations 2/3/2021     Past Surgical History:   Procedure Laterality Date     DILATION AND CURETTAGE SUCTION  2/13/2012    Procedure:DILATION AND CURETTAGE SUCTION; DILATION AND CURETTAGE SUCTION ; Surgeon:LEIGHTON GALLAGHER; Location:[facility] OR     ENT SURGERY      tumor rem under ear, benign     Family History   Problem Relation Age of Onset     Allergies Father         seasonal     Allergies Sister         seasonal     Thyroid Disease Sister      Thyroid Disease Mother      Diabetes Paternal Grandfather      Social History     Socioeconomic History     Marital status:      Spouse name: Tyler     Number of children: 4     Years of education: Not on file     Highest education level: Not on file   Occupational History     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 Not Asked   Social History Narrative    Patient has 4 kids (21, 17, 6, 2) and  with a  [employer] studio. Used to work in the airline industry.     Social Determinants of Health     Financial Resource Strain:      Difficulty of Paying Living Expenses:    Food Insecurity:      Worried About Running Out of Food in the Last Year:      Ran Out of Food in the Last Year:    Transportation Needs:      Lack of Transportation (Medical):      Lack of Transportation (Non-Medical):    Physical Activity:      Days of Exercise per Week:      Minutes of Exercise per Session:    Stress:      Feeling of Stress :    Social Connections:      Frequency of Communication with Friends and Family:      Frequency of Social Gatherings with Friends and Family:      Attends Church Services:      Active Member of Clubs or Organizations:      Attends Club or Organization Meetings:      Marital Status:    Intimate Partner Violence:      Fear of Current or Ex-Partner:      Emotionally Abused:      Physically Abused:      Sexually Abused:        Allergies:  Avelox, Erythromycin, and Sulfa drugs    Current Outpatient Medications   Medication Sig Dispense Refill     norgestim-eth estrad triphasic (ORTHO TRI-CYCLEN LO) 0.18/0.215/0.25 MG-25 MCG tablet Take 1 tablet by mouth daily 84 tablet 3       Review Of Systems   ROS: 10 point ROS neg other than the symptoms noted above in the HPI.    Exam:  /60   Wt 65.8 kg (145 lb)   BMI 22.71 kg/m    {Constitutional: healthy, alert and no distress  Cardiovascular: negative, PMI normal. No lifts, heaves, or thrills. RRR. No murmurs, clicks gallops or rub  Respiratory: negative, Percussion normal. Good diaphragmatic excursion. Lungs clear  Gastrointestinal: Abdomen soft, non-tender. BS normal. No masses, organomegaly  Genitourinary: Normal external genitalia without lesions and speculum exam multipara cervix menstrual type flow noted IUD string visible.  The Mirena IUD was removed in the usual sterile fashion with a ring forceps without difficulty or complications and disposed of properly.   Bimanual exam the uterus is parous mobile firm smooth adnexa without masses enlargement or tenderness.  No evidence of pregnancy clinically    Assessment/Plan:  (Z30.011) Encounter for initial prescription of contraceptive pills  (primary encounter diagnosis)  Comment: Patient desires to have the Mirena IUD removed.  We discussed that combination OCPs still have a progestin in them.  She had been on in the past but had difficulty remembering to take the pill every day  Plan: norgestim-eth estrad triphasic (ORTHO         TRI-CYCLEN LO) 0.18/0.215/0.25 MG-25 MCG tablet        I gave her a 3-month course with instructions if this works well I left a refill for the remainder of the year    (Z71.89) Counseled about COVID-19 virus infection  Comment: Strongly encouraged to get vaccination  Plan: Written plan given      Gordon Baeza M.D.

## 2021-08-21 ENCOUNTER — NURSE TRIAGE (OUTPATIENT)
Dept: NURSING | Facility: CLINIC | Age: 41
End: 2021-08-21

## 2021-08-21 NOTE — TELEPHONE ENCOUNTER
Pt is calling in about her eyes gradually starting to have trouble focusing. Pt reports she first noticed it Wednesday, and it is worsening. Pt reports a feeling of swelling her eyes, some watering at times, and they feel very sore. Pt denies any headaches, double vision, or blurry vision, pain, drainage, loss of vision, redness, or swelling in the eyelids.   Care advice given, and per protocol pt should be evaluated for a routine exam. Offered to transfer patient to scheduling, but patient would like to go to the Urgent Care to have this evaluated before Monday. Pt is concerned this may be new to her new birth control prescription. Pt was advised to call back if symptoms worsen. Pt verbalized understanding.     Terry Conroy RN on 8/21/2021 at 2:39 PM      Reason for Disposition    [1] Blurred vision or visual changes AND [2] gradual onset (e.g., weeks, months)    Additional Information    Negative: Weakness of the face, arm or leg on one side of the body    Negative: Followed getting substance in the eye    Negative: Foreign body or object is or was lodged in the eye    Negative: Followed an eye injury    Negative: Followed sun lamp or sun exposure (UV keratitis)    Negative: Yellow or green discharge (pus) in the eye    Negative: Pregnant    Negative: Postpartum (from 0 to 6 weeks after delivery)    Negative: Complete loss of vision in 1 or both eyes    Negative: Severe eye pain    Negative: SEVERE headache    Negative: Double vision    Negative: [1] Blurred vision or visual changes AND [2] present now AND [3] sudden onset or new (e.g., minutes, hours, days)  (Exception: seeing floaters / black specks OR previously diagnosed migraine headaches with same symptoms)    Negative: Patient sounds very sick or weak to the triager    Negative: Flashes of light  (Exception: brief from pressing on the eyeball)    Negative: [1] Eye pain AND [2] brief (now gone) blurred vision or visual changes    Negative: [1] Taking  digoxin (e.g., Lanoxin, Digitek, Cardoxin, Lanoxicaps; Toloxin in [place]) AND [2] blurred vision, yellow vision, or yellow-green halos    Negative: Many floaters in the eye    Negative: [1] Jaw pain while eating AND [2] age > 50    Negative: [1] Headache AND [2] age > 50    Negative: Single floater (i.e., small speck seems to float across the eye)    Negative: [1] Brief (now gone) blurred vision AND [2] unexplained    Negative: [1] Laser light exposure AND [2] blurred vision present > 15 minutes    Protocols used: VISION LOSS OR CHANGE-A-AH

## 2021-08-23 ENCOUNTER — OFFICE VISIT (OUTPATIENT)
Dept: URGENT CARE | Facility: URGENT CARE | Age: 41
End: 2021-08-23
Payer: COMMERCIAL

## 2021-08-23 VITALS
HEART RATE: 77 BPM | DIASTOLIC BLOOD PRESSURE: 79 MMHG | TEMPERATURE: 98.5 F | OXYGEN SATURATION: 100 % | SYSTOLIC BLOOD PRESSURE: 121 MMHG | RESPIRATION RATE: 12 BRPM

## 2021-08-23 DIAGNOSIS — H57.13 EYE PAIN, BILATERAL: Primary | ICD-10-CM

## 2021-08-23 PROCEDURE — 99213 OFFICE O/P EST LOW 20 MIN: CPT | Performed by: FAMILY MEDICINE

## 2021-08-23 NOTE — PROGRESS NOTES
VISION   No corrective lenses  Tool used: CARLEE   Right eye:(20/25)  Left eye:  (20/63)  Both: 20/20    Tamara Florence MA on 8/23/2021 at 2:41 PM

## 2021-08-23 NOTE — PROGRESS NOTES
Clinical Decision Making:    At the end of the encounter, I discussed results, diagnosis, medications. Discussed red flags for immediate return to clinic/ER, as well as indications for follow up if no improvement. Patient understood and agreed to plan. Patient was stable for discharge.      ICD-10-CM    1. Eye pain, bilateral  H57.13      Discussed that this is likely related to the oral contraceptive pill.  Recommended that she could stop it right away or if the symptoms are not too bothersome she could try to continue through the pill pack to see if it resolves on the placebo week.  Recommended that she be seen by an eye doctor for a more thorough dilated eye exam.  She should get this done within the week.  Patient verbalizes understanding.  Also discussed with her going back on Depo or using a copper IUD for birth control.        There are no Patient Instructions on file for this visit.   No follow-ups on file.      chief complaint    HPI:  Delicia Henry is a 40 year old female who presents today complaining of bilateral eye soreness for 6 days.  6 days ago it for started as just some twitching or irritation to her bilateral eyes.  She was outdoors a lot 2 to 3 days ago and had increased eye soreness bilaterally.  She has never had a foreign body sensation in her eyes.  She has no vision changes in the eyes.  She has no drainage or redness to the eyes.  She reports that she was on Depo shots for years and tolerated them well.  More recently she was noticing water weight gain and changed to Norplant for birth control.  She had episodes of extreme head pressure and once the Norplant was removed but the symptoms resolved within 2 days.  She had an IUD placed and had had symptoms again and so that was also removed.  She is on her first month of trying oral contraceptives for the first time ever.  She is on Ortho Tri-Cyclen.  Her eye symptoms started the second week of her pill pack.    History obtained  from the patient.    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  2017-12: Indication for care in labor or delivery  2017-12: Vaginal delivery  2017-07: Prenatal care, subsequent pregnancy  2014-01: Indication for care in labor or delivery  2013-07: Encounter for supervision of other normal pregnancy  2012-02: Encounter for supervision of other normal pregnancy  2012-02: Cleft palate  2012-02: Exercise-induced asthma  2012-02: UTI (urinary tract infection)  2010-10: CARDIOVASCULAR SCREENING; LDL GOAL LESS THAN 160  2009-06: Contraceptive management  2002-11: Encounter for supervision of other normal pregnancy      Past Medical History:   Diagnosis Date     Asthma     exercise induced asthma     MEDICAL HISTORY OF -     recurrent uti's urethral dilitation , tigonitis.     Molar pregnancy      Palpitations 2/3/2021       Social History     Tobacco Use     Smoking status: Never Smoker     Smokeless tobacco: Never Used   Substance Use Topics     Alcohol use: No       Review of systems  Negative except as listed in HPI    Vitals:    08/23/21 1441   BP: 121/79   Pulse: 77   Resp: 12   Temp: 98.5  F (36.9  C)   TempSrc: Tympanic   SpO2: 100%       Physical Exam  Vitals noted and within normal limits including vision  General she is alert, oriented, and in no acute distress  Eyes with extraocular motions intact.  Pupils equal round and reactive to light.  Conjunctive not injected.  Ophthalmic exam with normal vessels and sharp optic disks

## 2021-09-04 ENCOUNTER — HEALTH MAINTENANCE LETTER (OUTPATIENT)
Age: 41
End: 2021-09-04

## 2021-12-22 ENCOUNTER — TELEPHONE (OUTPATIENT)
Dept: INTERNAL MEDICINE | Facility: CLINIC | Age: 41
End: 2021-12-22
Payer: MEDICAID

## 2021-12-22 ENCOUNTER — E-VISIT (OUTPATIENT)
Dept: FAMILY MEDICINE | Facility: CLINIC | Age: 41
End: 2021-12-22
Payer: COMMERCIAL

## 2021-12-22 DIAGNOSIS — Z20.828 EXPOSURE TO INFLUENZA: Primary | ICD-10-CM

## 2021-12-22 PROCEDURE — 99421 OL DIG E/M SVC 5-10 MIN: CPT | Performed by: FAMILY MEDICINE

## 2021-12-22 NOTE — TELEPHONE ENCOUNTER
S-(situation): Pt calling with reports of positive flu exposure.    B-(background): Pt reports oldest son was seen in ER on Saturday, dx with influenza A. Pt reports she is now starting to have symptoms.      A-(assessment): Pt inquired about getting Tamiflu for whole family. Reports son live with family, does not want to have flu symptoms over odalis.    R-(recommendations): Pt advised to send E-visit to request tamiflu with herself and rest of family    Barak HARDIN RN   [facility]

## 2021-12-23 RX ORDER — OSELTAMIVIR PHOSPHATE 75 MG/1
75 CAPSULE ORAL 2 TIMES DAILY
Qty: 10 CAPSULE | Refills: 0 | Status: SHIPPED | OUTPATIENT
Start: 2021-12-23 | End: 2021-12-28

## 2021-12-23 NOTE — PATIENT INSTRUCTIONS
Thank you for choosing us for your care. I have placed an order for a prescription so that you can start treatment. View your full visit summary for details by clicking on the link below. Your pharmacist will able to address any questions you may have about the medication.     If you're not feeling better within 5-7 days, please schedule an appointment.  You can schedule an appointment right here in [employer], or call [phone]  If the visit is for the same symptoms as your eVisit, we'll refund the cost of your eVisit if seen within seven days.    Thank you for choosing us for your care. Given your symptoms, I would like you to do a lab-only visit to determine what is causing them.  I have placed the orders.  Please schedule an appointment with the lab right here in [employer], or call [phone].  I will let you know when the results are back and next steps to take.      Tamiflu sent for you Delicia-we would need separate appointments for kids if they are patients here-if not we can't prescribe. Let me know if you would want COVID or influenza testing.     Only start the Tamiflu if within 2 days of start of symptoms.     Lori Regalado, CNP

## 2022-04-16 ENCOUNTER — HEALTH MAINTENANCE LETTER (OUTPATIENT)
Age: 42
End: 2022-04-16

## 2022-09-01 DIAGNOSIS — Z30.011 ENCOUNTER FOR INITIAL PRESCRIPTION OF CONTRACEPTIVE PILLS: ICD-10-CM

## 2022-09-01 NOTE — TELEPHONE ENCOUNTER
Overdue for annual exam.  Left message on answering machine for patient to call back to schedule appt.  Will wait to fill.  Rosie Sebastian RN

## 2022-09-12 RX ORDER — NORGESTIMATE AND ETHINYL ESTRADIOL
KIT
Qty: 84 TABLET | Refills: 0 | Status: SHIPPED | OUTPATIENT
Start: 2022-09-12 | End: 2022-09-15

## 2022-09-12 NOTE — TELEPHONE ENCOUNTER
"Reason for Call:  Medication or medication refill:    Do you use a [facility]?  Name of the pharmacy and phone number for the current request: [facility] 24 hour on [address]  Name of the medication requested: Her birth control pill \"Tri something...\"      She was told she had to make an appointment and she did for this week and was told her medication refill would be sent to the pharmacy. However, when she checked it is not at the pharmacy as promised. Can you please send a refill of her birth control to her pharmacy for her? Thank you.    Other request: See above (FYI she came in on Primary care line, so if I did not sent to right pool please send to correct one on high priority. Thank you!)    Can we leave a detailed message on this number? Yes     Phone number patient can be reached at: [phone]    Best Time: Any asap.    Call taken on 9/12/2022 at 4:28 PM by Isamar Tom      "

## 2022-09-15 ENCOUNTER — OFFICE VISIT (OUTPATIENT)
Dept: OBGYN | Facility: CLINIC | Age: 42
End: 2022-09-15
Payer: COMMERCIAL

## 2022-09-15 VITALS — WEIGHT: 136 LBS | BODY MASS INDEX: 21.3 KG/M2 | SYSTOLIC BLOOD PRESSURE: 120 MMHG | DIASTOLIC BLOOD PRESSURE: 76 MMHG

## 2022-09-15 DIAGNOSIS — Z12.4 SCREENING FOR MALIGNANT NEOPLASM OF CERVIX: ICD-10-CM

## 2022-09-15 DIAGNOSIS — Z12.31 ENCOUNTER FOR SCREENING MAMMOGRAM FOR BREAST CANCER: Primary | ICD-10-CM

## 2022-09-15 DIAGNOSIS — Z01.419 ENCOUNTER FOR GYNECOLOGICAL EXAMINATION WITHOUT ABNORMAL FINDING: ICD-10-CM

## 2022-09-15 DIAGNOSIS — Z30.011 ENCOUNTER FOR INITIAL PRESCRIPTION OF CONTRACEPTIVE PILLS: ICD-10-CM

## 2022-09-15 PROCEDURE — 87624 HPV HI-RISK TYP POOLED RSLT: CPT | Performed by: OBSTETRICS & GYNECOLOGY

## 2022-09-15 PROCEDURE — 99396 PREV VISIT EST AGE 40-64: CPT | Performed by: OBSTETRICS & GYNECOLOGY

## 2022-09-15 PROCEDURE — G0145 SCR C/V CYTO,THINLAYER,RESCR: HCPCS | Performed by: OBSTETRICS & GYNECOLOGY

## 2022-09-15 RX ORDER — NORGESTIMATE AND ETHINYL ESTRADIOL 7DAYSX3 LO
1 KIT ORAL DAILY
Qty: 84 TABLET | Refills: 3 | Status: SHIPPED | OUTPATIENT
Start: 2022-09-15 | End: 2023-11-13

## 2022-09-15 NOTE — NURSING NOTE
"Chief Complaint   Patient presents with     Physical       Initial /76   Wt 61.7 kg (136 lb)   BMI 21.30 kg/m   Estimated body mass index is 21.3 kg/m  as calculated from the following:    Height as of 21: 1.702 m (5' 7\").    Weight as of this encounter: 61.7 kg (136 lb).  BP completed using cuff size: regular    Questioned patient about current smoking habits.  Pt. has never smoked.          The following HM Due: mammogram  pap smear      The following patient reported/Care Every where data was sent to:  P ABSTRACT QUALITY INITIATIVES [[id number]]        orders have been placed    Tawnya Landon CMA                 "

## 2022-09-16 NOTE — PROGRESS NOTES
HPI:  Delicia Henry is a 41 year old white female  ,oral contraceptives for contraception who presents for an annual exam and pap.  She is presently on Tri-Sprintec Lo and states that is working very well for her.  The previous side effects I have outlined in earlier visits with a Mirena IUD and with Nexplanon Are no longer present.  Self breast exam,  ACS screening mammogram recs, the use of 81 mg ASA to decrease the risk of heart disease, lipid screening, colon cancer screening recs and Dexa scan recs thoroughly reveiwed.   Instrucitons and indications for backup were thoroughly rev.  All her ?'s were answered.  I also reviewed her previous lipid panel results.  The patient's never had a mammogram done    Past Medical History:   Diagnosis Date     Asthma     exercise induced asthma     MEDICAL HISTORY OF -     recurrent uti's urethral dilitation , tigonitis.     Molar pregnancy      Palpitations 2/3/2021     Past Surgical History:   Procedure Laterality Date     DILATION AND CURETTAGE SUCTION  2012    Procedure:DILATION AND CURETTAGE SUCTION; DILATION AND CURETTAGE SUCTION ; Surgeon:LEIGHTON GALLAGHER; Location:[facility] OR     ENT SURGERY      tumor rem under ear, benign     Family History   Problem Relation Age of Onset     Allergies Father         seasonal     Allergies Sister         seasonal     Thyroid Disease Sister      Thyroid Disease Mother      Diabetes Paternal Grandfather      Social History     Socioeconomic History     Marital status:      Spouse name: Tyler     Number of children: 4     Years of education: Not on file     Highest education level: Not on file   Occupational History     Occupation: [employer] - [place]     Comment: Owner   Tobacco Use     Smoking status: Never Smoker     Smokeless tobacco: Never Used   Vaping Use     Vaping Use: Never used   Substance and Sexual Activity     Alcohol use: No     Drug use: No     Sexual activity: Yes     Partners: Male      Birth control/protection: Injection   Other Topics Concern     Parent/sibling w/ CABG, MI or angioplasty before 65F 55M? 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 Used to work in the airline industry.     Social Determinants of Health     Financial Resource Strain: Not on file   Food Insecurity: Not on file   Transportation Needs: Not on file   Physical Activity: Not on file   Stress: Not on file   Social Connections: Not on file   Intimate Partner Violence: Not on file   Housing Stability: Not on file       Allergies:  Avelox, Erythromycin, and Sulfa drugs    Current Outpatient Medications   Medication Sig Dispense Refill     norgestim-eth estrad triphasic (TRI-LO-SPRINTEC) 0.18/0.215/0.25 MG-25 MCG tablet Take 1 tablet by mouth daily 84 tablet 3       ROS: ROS: 10 point ROS neg other than the symptoms noted above in the HPI.    EXAM:  Vitals: /76   Wt 61.7 kg (136 lb)   BMI 21.30 kg/m    BMI= Body mass index is 21.3 kg/m .  Constitutional: healthy, alert and no distress  Head: Normocephalic. No masses, lesions, tenderness or abnormalities  Neck: Neck supple. No adenopathy. Thyroid symmetric, normal size,, Carotids without bruits.  ENT: NEGATIVE for ear, mouth and throat problems  Breast:  breasts symmetric, no dominant or suspicious mass, no skin or nipple changes, no axillary adenopathy, unchanged from previous exam or self exam in taught and encouraged bilateral implants are in place  Cardiovascular: negative, PMI normal. No lifts, heaves, or thrills. RRR. No murmurs, clicks gallops or rub  Respiratory: negative, Percussion normal. Good diaphragmatic excursion. Lungs clear  Gastrointestinal: Abdomen soft, non-tender. BS normal. No masses, organomegaly  Genitourinary: Pelvic Exam:  External Genitalia:     Normal appearance for age, no discharge present, no tenderness present, no inflammatory lesions present, color normal  Vagina:     Normal  vaginal vault without central or paravaginal defects, no discharge present, no inflammatory lesions present, no masses present  Bladder:     Nontender to palpation  Urethra:   Urethral Body:  Urethra palpation normal, urethra structural support normal   Urethral Meatus:  No erythema or lesions present  Cervix:     Appearance healthy, no lesions present, nontender to palpation, no bleeding present  Uterus:     Uterus: firm, normal sized and nontender, midplane in position.   Adnexa:     No adnexal tenderness present, no adnexal masses present  Perineum:     Perineum within normal limits, no evidence of trauma, no rashes or skin lesions present  Anus:     Anus within normal limits, no hemorrhoids present  Inguinal Lymph Nodes:     No lymphadenopathy present  Pubic Hair:     Normal pubic hair distribution for age  Genitalia and Groin:     No rashes present, no lesions present, no areas of discoloration, no masses present    Musculoskeletalextremities normal- no gross deformities noted, gait normal and normal muscle tone  Integument: no suspicious lesions or rashes  Neurologic: Gait normal. Reflexes normal and symmetric. Sensation grossly WNL.  Psychiatric: mentation appears normal and affect normal/bright  Hematologic/Lymphatic/Immunologic: Normal cervical lymph nodes     ASSESSMENT:/PLAN:  (Z12.31) Encounter for screening mammogram for breast cancer  (primary encounter diagnosis)  Comment: Recommendations reviewed  Plan: MA Screen Bilateral w/Venkata        Ordered    (Z01.419) Encounter for gynecological examination without abnormal finding  Comment: Otherwise unremarkable GYN exam  Plan: Return 1 year or as needed concerns    (Z30.011) Encounter for initial prescription of contraceptive pills  Comment: The birth control pill is working well she desires a refill  Plan: norgestim-eth estrad triphasic         (TRI-LO-SPRINTEC) 0.18/0.215/0.25 MG-25 MCG         tablet        1 year refill given    (Z12.4) Screening for  malignant neoplasm of cervix  Comment: Recommendations and past history reviewed  Plan: Pap screen with HPV - recommended age 30 - 65         years        Done today      Gordon Baeza M.D.

## 2022-09-16 NOTE — PATIENT INSTRUCTIONS
You can reach your [facility] Care Team any time of the day by calling [phone]. This number will put you in touch with the 24 hour nurse line if the clinic is closed.    To contact your OB/GYN Surgery Scheduler please call [phone]. This is a direct number for your care team between 8 a.m. and 4 p.m. Monday through Friday.    [facility] is open for your convenience: [phone]  Monday through Friday 8 a.m. to 8:30 p.m.  Saturday 9 a.m. to 6 p.m.  Sunday Noon to 6 p.m.    They are closed on all major holidays.

## 2022-09-19 LAB
BKR LAB AP GYN ADEQUACY: NORMAL
BKR LAB AP GYN INTERPRETATION: NORMAL
BKR LAB AP HPV REFLEX: NORMAL
BKR LAB AP PREVIOUS ABNORMAL: NORMAL
PATH REPORT.COMMENTS IMP SPEC: NORMAL
PATH REPORT.COMMENTS IMP SPEC: NORMAL
PATH REPORT.RELEVANT HX SPEC: NORMAL

## 2022-09-21 LAB
HUMAN PAPILLOMA VIRUS 16 DNA: NEGATIVE
HUMAN PAPILLOMA VIRUS 18 DNA: NEGATIVE
HUMAN PAPILLOMA VIRUS FINAL DIAGNOSIS: NORMAL
HUMAN PAPILLOMA VIRUS OTHER HR: NEGATIVE

## 2022-10-06 NOTE — ED TRIAGE NOTES
"She describes episodes of \"breathlessness\" and feeling her heart racing. She has used an inhaler that helps with some of the breathlessness.  She feels like she can get a full breath, but does not feel like the oxygen is getting to her body.    Was tested for COVID-19, had an xray Sunday and had no abnormal results.  She was feeling better until last night when the symptoms resumed.  She had no labs drawn in urgent care.      Had been ill with presumed COVID in March.  In April she had an episode of shortness of breath and was diagnosed with a possible anxiety attack.  After this her PMD had prescribed a muscle relaxant which seemed to help.    ABCs intact.  Patient is alert and oriented x3.      " Dr. Vicente Webster,    Patient is scheduled for colonoscopy on 10/18/22. Was seen by dermatologist for shingles symptoms, currently being treated with kenalog and valtrex. She is inquiring if she needs some sort of clearance to proceed with procedure or ok to keep as planned.

## 2022-10-16 ENCOUNTER — HEALTH MAINTENANCE LETTER (OUTPATIENT)
Age: 42
End: 2022-10-16

## 2023-06-06 ENCOUNTER — OFFICE VISIT (OUTPATIENT)
Dept: URGENT CARE | Facility: URGENT CARE | Age: 43
End: 2023-06-06
Payer: COMMERCIAL

## 2023-06-06 VITALS
RESPIRATION RATE: 16 BRPM | HEART RATE: 91 BPM | OXYGEN SATURATION: 99 % | TEMPERATURE: 98.5 F | DIASTOLIC BLOOD PRESSURE: 86 MMHG | SYSTOLIC BLOOD PRESSURE: 131 MMHG

## 2023-06-06 DIAGNOSIS — J45.21 MILD INTERMITTENT ASTHMA WITH EXACERBATION: Primary | ICD-10-CM

## 2023-06-06 PROCEDURE — 99214 OFFICE O/P EST MOD 30 MIN: CPT | Performed by: FAMILY MEDICINE

## 2023-06-06 RX ORDER — PREDNISONE 20 MG/1
40 TABLET ORAL DAILY
Qty: 10 TABLET | Refills: 0 | Status: SHIPPED | OUTPATIENT
Start: 2023-06-06 | End: 2023-06-11

## 2023-06-06 RX ORDER — ALBUTEROL SULFATE 90 UG/1
2 AEROSOL, METERED RESPIRATORY (INHALATION) EVERY 6 HOURS PRN
Qty: 18 G | Refills: 0 | Status: SHIPPED | OUTPATIENT
Start: 2023-06-06

## 2023-06-06 NOTE — PROGRESS NOTES
"SUBJECTIVE:   Delicia Henry is a 42 year old female presenting with a chief complaint of cough, SOB, chest pain, \"hot flashes\".  Onset of symptoms was 1 day(s) ago.  Course of illness is worsening.    Severity moderate  Current and Associated symptoms: SOB  Treatment measures tried include Fluids, Rest and albuterol.  Predisposing factors include None.    Woke up yesterday and felt like she just got out of airplane.  Developed cough, and felt hot.  Feels like there is something in throat/chest but not coming up.  Endorsed chest and back pain.    This morning feels like can't breath.  Tried albuterol inhaler but only couple of puffs left so needs a new one.    Had COVID in March, had headache, body aches and then later develop SOB at the tail end    Past Medical History:   Diagnosis Date     Asthma     exercise induced asthma     MEDICAL HISTORY OF -     recurrent uti's urethral dilitation , tigonitis.     Molar pregnancy      Palpitations 2/3/2021     Current Outpatient Medications   Medication Sig Dispense Refill     norgestim-eth estrad triphasic (TRI-LO-SPRINTEC) 0.18/0.215/0.25 MG-25 MCG tablet Take 1 tablet by mouth daily 84 tablet 3     Social History     Tobacco Use     Smoking status: Never     Smokeless tobacco: Never   Vaping Use     Vaping status: Never Used   Substance Use Topics     Alcohol use: No       ROS:  Review of systems negative except as stated above.    OBJECTIVE:  /86   Pulse 91   Temp 98.5  F (36.9  C) (Tympanic)   Resp 16   LMP 06/05/2023   SpO2 99%   Breastfeeding No   GENERAL APPEARANCE: healthy, alert and no distress  EYES: EOMI,  PERRL, conjunctiva clear  RESP: lungs clear to auscultation - no rales, rhonchi or wheezes  CV: regular rates and rhythm, normal S1 S2, no murmur noted  PSYCH: mentation appears normal and affect normal/bright      ASSESSMENT/PLAN:  (J45.21) Mild intermittent asthma with exacerbation  (primary encounter diagnosis)  Plan: albuterol (PROAIR " HFA/PROVENTIL HFA/VENTOLIN         HFA) 108 (90 Base) MCG/ACT inhaler, predniSONE         (DELTASONE) 20 MG tablet            Reassurance given, reviewed that most likely has asthma flare given poor air quality and underlying mild asthma.  RX albuterol inhaler and RX prednisone burst given for treatment.  Encourage plenty of fluids and rest.    Follow up with primary provider if no improvement of symptoms in 1 week    Florencio Ellison MD  June 6, 2023 12:16 PM

## 2023-06-09 ENCOUNTER — OFFICE VISIT (OUTPATIENT)
Dept: URGENT CARE | Facility: URGENT CARE | Age: 43
End: 2023-06-09
Payer: COMMERCIAL

## 2023-06-09 VITALS
SYSTOLIC BLOOD PRESSURE: 129 MMHG | OXYGEN SATURATION: 100 % | TEMPERATURE: 97.6 F | HEART RATE: 85 BPM | RESPIRATION RATE: 16 BRPM | DIASTOLIC BLOOD PRESSURE: 82 MMHG

## 2023-06-09 DIAGNOSIS — J06.9 VIRAL URI: Primary | ICD-10-CM

## 2023-06-09 DIAGNOSIS — J45.21 MILD INTERMITTENT ASTHMA WITH EXACERBATION: ICD-10-CM

## 2023-06-09 PROCEDURE — 99214 OFFICE O/P EST MOD 30 MIN: CPT | Performed by: PHYSICIAN ASSISTANT

## 2023-06-09 NOTE — PROGRESS NOTES
Assessment & Plan:      Problem List Items Addressed This Visit    None  Visit Diagnoses     Viral URI    -  Primary    Mild intermittent asthma with exacerbation            Medical Decision Making  Patient with history of mild intermittent asthma presents with ongoing cough and throat tightness.  Symptoms appear consistent with a viral upper respiratory infection likely causing some exacerbation of asthma.  Recommend patient initiate her previously prescribed steroids to help with throat tightness and cough.  Discussed treatment and symptomatic care.  Allergies and medication interactions reviewed.  Discussed signs of worsening symptoms and when to follow-up with PCP if no symptom improvement.     Subjective:      Delicia Henry is a 42 year old female with history of mild intermittent asthma, here for evaluation of cough and throat tightness.  Onset of symptoms was 1 week ago.  Patient was prescribed albuterol and prednisone at that time for suspected asthma exacerbation.  Patient has been using her albuterol inhaler, but did not initiate the steroids due to concerns for immune suppression.  Patient does note slight improvement of symptoms after the albuterol, but still notes the throat tightness.  Associated symptoms include hoarse voice.  Patient has other family members that came down with bark-like cough over the last week.  No fevers.     The following portions of the patient's history were reviewed and updated as appropriate: allergies, current medications, and problem list.     Review of Systems  Pertinent items are noted in HPI.    Allergies  Allergies   Allergen Reactions     Erythromycin Nausea     Moxifloxacin Hcl In Nacl Swelling     Swelling tongue, dizzy, itching     Sulfa Antibiotics      Unknown reaction       Family History   Problem Relation Age of Onset     Allergies Father         seasonal     Allergies Sister         seasonal     Thyroid Disease Sister      Thyroid Disease Mother       Diabetes Paternal Grandfather        Social History     Tobacco Use     Smoking status: Never     Smokeless tobacco: Never   Vaping Use     Vaping status: Never Used   Substance Use Topics     Alcohol use: No        Objective:      /82   Pulse 85   Temp 97.6  F (36.4  C) (Tympanic)   Resp 16   LMP 06/05/2023   SpO2 100%   General appearance - alert, well appearing, and in no distress and non-toxic  Ears - bilateral TM's and external ear canals normal  Nose - normal and patent, no erythema, discharge or polyps  Mouth - mucous membranes moist, pharynx normal without lesions  Neck - supple, no significant adenopathy  Chest - clear to auscultation, no wheezes, rales or rhonchi, symmetric air entry  Heart - normal rate, regular rhythm, normal S1, S2, no murmurs, rubs, clicks or gallops    The use of Dragon/Pixelated dictation services was used to construct the content of this note; any grammatical errors are non-intentional. Please contact the author directly if you are in need of any clarification.

## 2023-10-23 ENCOUNTER — OFFICE VISIT (OUTPATIENT)
Dept: OBGYN | Facility: CLINIC | Age: 43
End: 2023-10-23
Payer: COMMERCIAL

## 2023-10-23 VITALS — DIASTOLIC BLOOD PRESSURE: 82 MMHG | WEIGHT: 144 LBS | BODY MASS INDEX: 22.55 KG/M2 | SYSTOLIC BLOOD PRESSURE: 130 MMHG

## 2023-10-23 DIAGNOSIS — Z12.11 SPECIAL SCREENING FOR MALIGNANT NEOPLASMS, COLON: ICD-10-CM

## 2023-10-23 DIAGNOSIS — Z12.31 ENCOUNTER FOR SCREENING MAMMOGRAM FOR BREAST CANCER: ICD-10-CM

## 2023-10-23 DIAGNOSIS — N93.9 ABNORMAL UTERINE BLEEDING (AUB): ICD-10-CM

## 2023-10-23 DIAGNOSIS — Z30.8 ENCOUNTER FOR OTHER CONTRACEPTIVE MANAGEMENT: Primary | ICD-10-CM

## 2023-10-23 LAB
HBA1C MFR BLD: 5.1 % (ref 0–5.6)
HGB BLD-MCNC: 13.7 G/DL (ref 11.7–15.7)
TSH SERPL DL<=0.005 MIU/L-ACNC: 1.46 UIU/ML (ref 0.3–4.2)

## 2023-10-23 PROCEDURE — 36415 COLL VENOUS BLD VENIPUNCTURE: CPT | Performed by: STUDENT IN AN ORGANIZED HEALTH CARE EDUCATION/TRAINING PROGRAM

## 2023-10-23 PROCEDURE — 85018 HEMOGLOBIN: CPT | Performed by: STUDENT IN AN ORGANIZED HEALTH CARE EDUCATION/TRAINING PROGRAM

## 2023-10-23 PROCEDURE — 83036 HEMOGLOBIN GLYCOSYLATED A1C: CPT | Performed by: STUDENT IN AN ORGANIZED HEALTH CARE EDUCATION/TRAINING PROGRAM

## 2023-10-23 PROCEDURE — 99214 OFFICE O/P EST MOD 30 MIN: CPT | Mod: 25 | Performed by: STUDENT IN AN ORGANIZED HEALTH CARE EDUCATION/TRAINING PROGRAM

## 2023-10-23 PROCEDURE — 84443 ASSAY THYROID STIM HORMONE: CPT | Performed by: STUDENT IN AN ORGANIZED HEALTH CARE EDUCATION/TRAINING PROGRAM

## 2023-10-23 PROCEDURE — 99396 PREV VISIT EST AGE 40-64: CPT | Performed by: STUDENT IN AN ORGANIZED HEALTH CARE EDUCATION/TRAINING PROGRAM

## 2023-10-23 RX ORDER — NORGESTIMATE AND ETHINYL ESTRADIOL 7DAYSX3 LO
1 KIT ORAL DAILY
Qty: 84 TABLET | Refills: 4 | Status: SHIPPED | OUTPATIENT
Start: 2023-10-23 | End: 2024-01-12

## 2023-10-23 NOTE — PROGRESS NOTES
[facility]  Annual exam    Date: 10/23/23     CC: Annual exam    HPI:  Delicia is a 43 year old  who is here for her annual exam.      Concerns today:  Birth control pill issues  - Aug 2021 to to 2022 started on ortho tri cyclen (triphasic). Was happy with this but noted vaginal itching.   - About 6 months ago switched to tri lo sprintec, also triphasic. (Unclear why the switch was made, ?pharmacy issue). Has been very unhappy with this. Has had irregular bleeding and other unwanted symptoms including very irritable, lox sex drive. Vaginal itching however has stopped on this OCP.  - Previously had issues w/ other BC    - Monophasic OCP in  caused her to lose hair  - Tried mirena and Nexplanon; did not like these    Preventative Health Assessment:   Exercise: Regularly  Diet: regular  Multivitamin: No  Lifestyle/sleep hygiene: not great as 5 you sleeps with her nightly  Tobacco use: no  Alcohol use: social  Drug use: no  Abuse: No physical, sexual, or verbal abuse at home; safe in  current relationship  Depression screening: Negative  Seatbelt: Yes  Home: Lives in a house with  and 2 kids; [place]  Guns: Yes, locked  Social: Owns [employer]    Ob/Gyn History:    Menses: Patient's last menstrual period was 10/17/2023 (approximate)..   Sexually Active: Yes, with 1 male partner- spouse  Contraception: Above  Any history of STDs: No  Incontinence: No    Health Maintenance:  Last pap smear: . Result: NILM HPV neg  Last mammograms: None  Last colonoscopy: None  Last lipid screenin. Result: Normal  Last DM screening: None  Last thyroid screening :     Immunizations:  Influenza: Declines  COVID: Declines  TDAP:     PMH:    Past Medical History:   Diagnosis Date    Asthma     exercise induced asthma    MEDICAL HISTORY OF -     recurrent uti's urethral dilitation , tigonitis.    Molar pregnancy     Palpitations 2/3/2021       Prob  List:    Patient Active Problem List   Diagnosis    Cleft palate    Congenital fusion of kidneys    Nexplanon in place    Palpitations    Pleurodynia    SOB (shortness of breath)    Postviral fatigue syndrome- intermittent        PSH:    Past Surgical History:   Procedure Laterality Date    COSMETIC MAMMOPLASTY AUGMENTATION      DILATION AND CURETTAGE SUCTION  02/13/2012    Procedure:DILATION AND CURETTAGE SUCTION; DILATION AND CURETTAGE SUCTION ; Surgeon:LEIGHTON GALLAGHER; Location:[facility] OR    ENT SURGERY      tumor rem under ear, benign       Allergies:    Allergies   Allergen Reactions    Erythromycin Nausea    Moxifloxacin Hcl In Nacl Swelling     Swelling tongue, dizzy, itching    Sulfa Antibiotics      Unknown reaction       Medications:    Current Outpatient Medications   Medication    albuterol (PROAIR HFA/PROVENTIL HFA/VENTOLIN HFA) 108 (90 Base) MCG/ACT inhaler    norgestim-eth estrad triphasic (ORTHO TRI-CYCLEN LO) 0.18/0.215/0.25 MG-25 MCG tablet    norgestim-eth estrad triphasic (TRI-LO-SPRINTEC) 0.18/0.215/0.25 MG-25 MCG tablet     No current facility-administered medications for this visit.       FH:    Family History   Problem Relation Age of Onset    Allergies Father         seasonal    Allergies Sister         seasonal    Thyroid Disease Sister     Thyroid Disease Mother     Diabetes Paternal Grandfather        SH:     Social History     Tobacco Use    Smoking status: Never    Smokeless tobacco: Never   Vaping Use    Vaping Use: Never used   Substance Use Topics    Alcohol use: No    Drug use: No       History   Smoking Status    Never   Smokeless Tobacco    Never       Review of systems:  With the exception of any items noted above, the 10 point ROS was negative.    O:  /82 (BP Location: Left arm, Cuff Size: Adult Regular)   Wt 65.3 kg (144 lb)   LMP 10/17/2023 (Approximate)   BMI 22.55 kg/m    General: alert, NAD, comfortable appearing  HEENT: head atraumatic, facial structures symmetric,  grossly normal  eye movement, pupils mid-sized, sclera normal appearing, oropharynx  normal appearing, MMM, vocalizes normally  Neck/Lymph: Supple, without thyromegaly   CV: RRR, pulses present  Resp: NWOB, CTAB  Breast: Bilateral symmetric breasts, nipples, areola. No rashes,  lesions, dimpling. Fibrocystic change/fullness palpated at 12 o'clock on right breast a 1-2 cm superior to nipple. Gland palpable in bilateral axilla  GI: Soft, non-tender, non-distended, no masses felt  Pelvic/: Normal external female genitalia. Vagina is without lesions,  mild physiologic discharge seen, vaginal walls estrogenized and  rugated. Cervix with numerous nabothian cysts otherwise without lesions. No cervical  motion tenderness. The uterus is normal size and shape, non tender.  The adnexal exam is without masses or tenderness. Exam chaperoned by Luna Coombs MA.  Neuro: A&O x3, speech clear, CN 2-12 grossly intact, moves all  extremities appropriately, no focal deficits  MSK: normal appearing extremities, grossly normal ROM of major joints  Intact  Ext: non-tender, no edema  Skin: warm and dry, skin color normal, no lesions visible  Psychiatric: affect/mood normal, cooperative    A/P:  Routine preventative exam of healthy female. Age appropriate counseling. Concern today regarding birth control pills and AUB.    Age 40-64 Annual Preventive Exam  1.  Screening          - Pap smear up to date          - Mammogram ordered - fullness noted 12 o'clock right breast          - Colorectal cancer - colonoscopy ordered          - Diabetes -A1c ordred          - Cholesterol and triglycerides - up to date    2.  Immunizations          - COVID and influenza declined    3.  Patient Education          - Role of annual exams and cervical cytology          - Calcium and Vitamin D          - Recommended folate 0.4 - 0.8 mg daily for women of reproductive age          - Physical activity          - Diet and exercise          - Weight control  and goal BMI          - Seatbelt use          - Breast awareness          - Skin cancer prevention          - Safe drinking habits (1 drink per day)          - Avoid smoking          - Avoid narcotics          - Lifestyle/sleep hygiene          - Discussed with patient risks/benefits and treatment options  of prescribed medications or other treatment modalities    4.   Contraception  AUB  ?Perimenopause  - AUB characterized by irregular bleeding  - Differential includes pregnancy, structural abnormality (polyp, fibroid, adenomyosis), hyperplasia/malignancy, endometrial abnormality (including atrophy), infection, ovulatory dysfunction (perimenopausal, PCOS, thyroid disorder, prolactinoma), vascular abnormality, coagulopathy, iatrogenic, medication induced (including related to OCP use). - Bleeding likely multimodal suspect perimenopausal and OCP related. May have atrophy and needed higher dose of estrogen  - If perimenopausal this may also be contributing to low sex drive, irritability?  - Delicia would like to switch back to the triphasic OCP that she used to be using (ortho tri cyclen lo) to see if this helps with bleeding and mood symptoms. This was ordered today. If irregular bleeding continues and thought to be related to atrophy may need higher dose of estrogen.    - Did have vaginal itching on ortho tri cyclen lo. Possibly due to genitourinary syndrome of menopause? If this occurs again could consider vaginal estrogen  - Workup: Hgb, TSH, a1c  - Declines GC/CT  - Pap  test current  - Pelvic US ordered  - Endometrial biopsy: will consider based on results of US      Poli Felix MD

## 2023-10-23 NOTE — NURSING NOTE
"Chief Complaint   Patient presents with    Gyn Exam     Pelvic & Breast exam   Pap UTD NIL   Mammo due          Initial /82 (BP Location: Left arm, Cuff Size: Adult Regular)   Wt 65.3 kg (144 lb)   LMP 10/17/2023 (Approximate)   BMI 22.55 kg/m   Estimated body mass index is 22.55 kg/m  as calculated from the following:    Height as of 21: 1.702 m (5' 7\").    Weight as of this encounter: 65.3 kg (144 lb).  BP completed using cuff size: regular    Questioned patient about current smoking habits.  Pt. has never smoked.          The following HM Due: mammogram      Luna Coombs CMA on 10/23/2023 at 11:12 AM         " normal... Well appearing, well nourished, awake, alert, oriented to person, place, time/situation and in no apparent distress.

## 2023-11-04 ENCOUNTER — HEALTH MAINTENANCE LETTER (OUTPATIENT)
Age: 43
End: 2023-11-04

## 2023-11-13 DIAGNOSIS — Z30.011 ENCOUNTER FOR INITIAL PRESCRIPTION OF CONTRACEPTIVE PILLS: ICD-10-CM

## 2023-11-13 RX ORDER — NORGESTIMATE AND ETHINYL ESTRADIOL 7DAYSX3 LO
1 KIT ORAL DAILY
Qty: 84 TABLET | Refills: 3 | Status: SHIPPED | OUTPATIENT
Start: 2023-11-13

## 2023-11-13 NOTE — TELEPHONE ENCOUNTER
"Requested Prescriptions   Pending Prescriptions Disp Refills    TRI-LO-REINALDO 0.18/0.215/0.25 MG-25 MCG tablet [Pharmacy Med Name: TRI-LO-REINALDO TABLETS] 84 tablet 3     Sig: TAKE 1 TABLET BY MOUTH DAILY       Contraceptives Protocol Passed - 11/13/2023  4:10 AM        Passed - Patient is not a current smoker if age is 35 or older        Passed - Recent (12 mo) or future (30 days) visit within the authorizing provider's specialty     Patient has had an office visit with the authorizing provider or a provider within the authorizing providers department within the previous 12 mos or has a future within next 30 days. See \"Patient Info\" tab in inbasket, or \"Choose Columns\" in Meds & Orders section of the refill encounter.              Passed - Medication is active on med list        Passed - No active pregnancy on record        Passed - No positive pregnancy test in past 12 months             "

## 2024-01-12 ENCOUNTER — OFFICE VISIT (OUTPATIENT)
Dept: URGENT CARE | Facility: URGENT CARE | Age: 44
End: 2024-01-12
Payer: COMMERCIAL

## 2024-01-12 VITALS
TEMPERATURE: 97.5 F | BODY MASS INDEX: 21.97 KG/M2 | HEART RATE: 82 BPM | HEIGHT: 67 IN | OXYGEN SATURATION: 100 % | SYSTOLIC BLOOD PRESSURE: 146 MMHG | WEIGHT: 140 LBS | DIASTOLIC BLOOD PRESSURE: 88 MMHG

## 2024-01-12 DIAGNOSIS — R06.02 SOB (SHORTNESS OF BREATH): Primary | ICD-10-CM

## 2024-01-12 PROCEDURE — 99213 OFFICE O/P EST LOW 20 MIN: CPT | Performed by: PHYSICIAN ASSISTANT

## 2024-01-12 ASSESSMENT — ENCOUNTER SYMPTOMS
FEVER: 0
WHEEZING: 0
RHINORRHEA: 0
SHORTNESS OF BREATH: 1
COUGH: 1
ABDOMINAL PAIN: 0
SORE THROAT: 0

## 2024-01-12 NOTE — PROGRESS NOTES
Assessment & Plan:        ICD-10-CM    1. SOB (shortness of breath)  R06.02             Plan/Clinical Decision Making:    Patient with mild cough, PND with intermittent SOB, worse with exertion. No fever or recent illness.   Normal exam today with normal vitals. No chest pain or edema. No leg swelling or calf tenderness.   Albuterol has been helping. Took Claritin today since has cat and hay allergies and exposure today, but not usually exposed. Unsure of exact etiology of symptoms, possible viral vs. Allergic. Can try Claritin daily, continue to use albuterol as needed. Can add Flonase daily for PND treatment and see if symptoms improved.       Return if symptoms worsen or fail to improve, for in 3-5 days.     At the end of the encounter, I discussed results, diagnosis, medications. Discussed red flags for immediate return to clinic/ER, as well as indications for follow up if no improvement. Patient understood and agreed to plan. Patient was stable for discharge.        Maryana Muñoz PA-C on 1/12/2024 at 1:51 PM          Subjective:     HPI:    Delicia is a 43 year old female who presents to clinic today for the following health issues:  Chief Complaint   Patient presents with    Urgent Care     Mild chest congestion with SOB x 1-2 weeks.     HPI    Patient having heaviness in chest, having SOB with walking up stairs.   Using albuterol inhaler. Helps a little bit.  Symptoms started over a week ago.   Having some back soreness. No injury or trauma. Has PND, but not illness, nasal congestion or fever. Has had mild cough. No wheezing. No abdominal pain.   Today took Claritin for allergies.       Review of Systems   Constitutional:  Negative for fever.   HENT:  Positive for postnasal drip. Negative for congestion, rhinorrhea and sore throat.    Respiratory:  Positive for cough (very slight) and shortness of breath (with exertion). Negative for wheezing.    Cardiovascular:  Negative for chest pain and leg swelling.  "  Gastrointestinal:  Negative for abdominal pain (no reflux).         Patient Active Problem List   Diagnosis    Cleft palate    Congenital fusion of kidneys    Nexplanon in place    Palpitations    Pleurodynia    SOB (shortness of breath)    Postviral fatigue syndrome- intermittent         Past Medical History:   Diagnosis Date    Asthma     exercise induced asthma    MEDICAL HISTORY OF -     recurrent uti's urethral dilitation , tigonitis.    Molar pregnancy     Palpitations 2/3/2021       Social History     Tobacco Use    Smoking status: Never    Smokeless tobacco: Never   Substance Use Topics    Alcohol use: No             Objective:     Vitals:    01/12/24 1311   BP: (!) 146/88   Pulse: 82   Temp: 97.5  F (36.4  C)   TempSrc: Tympanic   SpO2: 100%   Weight: 63.5 kg (140 lb)   Height: 1.702 m (5' 7\")         Physical Exam   EXAM:   Pleasant, alert, appropriate appearance. NAD.  Head Exam: Normocephalic, atraumatic.  Eye Exam:  non icteric/injection.    Ear Exam: TMs grey without bulging. Normal canals.  Normal pinna.  Nose Exam: Normal external nose.    OroPharynx Exam:  Moist mucous membranes. No erythema, pharynx without exudate or hypertrophy.  Neck/Thyroid Exam:  No LAD.    Chest/Respiratory Exam: CTAB.  Cardiovascular Exam: RRR. No murmur or rubs.  Ext/musculoskeletal: Grossly intact, No edema, no tenderness of back on palpation. No calf tenderness or swelling.   Neuro: CN II-XII intact grossly intact.  Skin: no rash or lesion.      Results:  No results found for any visits on 01/12/24.    "

## 2024-02-12 ENCOUNTER — OFFICE VISIT (OUTPATIENT)
Dept: URGENT CARE | Facility: URGENT CARE | Age: 44
End: 2024-02-12
Payer: COMMERCIAL

## 2024-02-12 VITALS
RESPIRATION RATE: 14 BRPM | TEMPERATURE: 98.2 F | OXYGEN SATURATION: 97 % | SYSTOLIC BLOOD PRESSURE: 118 MMHG | WEIGHT: 140 LBS | BODY MASS INDEX: 21.93 KG/M2 | HEART RATE: 83 BPM | DIASTOLIC BLOOD PRESSURE: 84 MMHG

## 2024-02-12 DIAGNOSIS — J10.1 INFLUENZA A: Primary | ICD-10-CM

## 2024-02-12 LAB
FLUAV AG SPEC QL IA: POSITIVE
FLUBV AG SPEC QL IA: NEGATIVE

## 2024-02-12 PROCEDURE — 99213 OFFICE O/P EST LOW 20 MIN: CPT | Performed by: PHYSICIAN ASSISTANT

## 2024-02-12 PROCEDURE — 87804 INFLUENZA ASSAY W/OPTIC: CPT | Performed by: PHYSICIAN ASSISTANT

## 2024-02-12 RX ORDER — OSELTAMIVIR PHOSPHATE 75 MG/1
75 CAPSULE ORAL 2 TIMES DAILY
Qty: 10 CAPSULE | Refills: 0 | Status: SHIPPED | OUTPATIENT
Start: 2024-02-12 | End: 2024-02-17

## 2024-02-12 NOTE — PROGRESS NOTES
URGENT CARE VISIT:    SUBJECTIVE:   Delicia Henry is a 43 year old female presenting with a chief complaint of chills, cough - productive, headache, body aches, and fatigue.  Onset was 2 day(s) ago.   She denies the following symptoms: shortness of breath, vomiting, and diarrhea  Course of illness is same.    Treatment measures tried include Dayquil/Nyquil with no relief of symptoms.  Predisposing factors include None.    PMH:   Past Medical History:   Diagnosis Date    Asthma     exercise induced asthma    MEDICAL HISTORY OF -     recurrent uti's urethral dilitation , tigonitis.    Molar pregnancy     Palpitations 2/3/2021     Allergies: Erythromycin, Moxifloxacin hcl in nacl, and Sulfa antibiotics   Medications:   Current Outpatient Medications   Medication Sig Dispense Refill    oseltamivir (TAMIFLU) 75 MG capsule Take 1 capsule (75 mg) by mouth 2 times daily for 5 days 10 capsule 0    TRI-LO-REINALDO 0.18/0.215/0.25 MG-25 MCG tablet TAKE 1 TABLET BY MOUTH DAILY 84 tablet 3    albuterol (PROAIR HFA/PROVENTIL HFA/VENTOLIN HFA) 108 (90 Base) MCG/ACT inhaler Inhale 2 puffs into the lungs every 6 hours as needed for shortness of breath, wheezing or cough (Patient not taking: Reported on 1/12/2024) 18 g 0     Social History:   Social History     Tobacco Use    Smoking status: Never    Smokeless tobacco: Never   Substance Use Topics    Alcohol use: No       ROS:  Review of systems negative except as stated above.    OBJECTIVE:  /84 (BP Location: Right arm, Patient Position: Chair, Cuff Size: Adult Regular)   Pulse 83   Temp 98.2  F (36.8  C) (Oral)   Resp 14   Wt 63.5 kg (140 lb)   LMP 01/10/2024 (Approximate)   SpO2 97%   BMI 21.93 kg/m    GENERAL APPEARANCE: healthy, alert and no distress  EYES: EOMI,  PERRL, conjunctiva clear  HENT: ear canals and TM's normal.  Nose and mouth without ulcers, erythema or lesions  NECK: supple, nontender, no lymphadenopathy  RESP: lungs clear to auscultation - no  rales, rhonchi or wheezes  CV: regular rates and rhythm, normal S1 S2, no murmur noted  SKIN: no suspicious lesions or rashes    Labs:    Results for orders placed or performed in visit on 02/12/24   Influenza A/B antigen     Status: Abnormal    Specimen: Nose; Swab   Result Value Ref Range    Influenza A antigen Positive (A) Negative    Influenza B antigen Negative Negative    Narrative    Test results must be correlated with clinical data. If necessary, results should be confirmed by a molecular assay or viral culture.       ASSESSMENT:    ICD-10-CM    1. Influenza A  J10.1 Influenza A/B antigen     oseltamivir (TAMIFLU) 75 MG capsule          PLAN:  Patient Instructions   Patient was educated on the natural course of viral illness which typically lasts up to 10 days.  Take medications as directed. Side effects discussed. Conservative measures discussed including increased fluids, nasal saline irrigation (neti pot), warm steamy shower, salt water gargles, cough suppressants, expectorants (Mucinex), decongestants (Pseudofed), and analgesics (Tylenol and/or Ibuprofen). See your primary care provider if symptoms worsen or do not improve in 7 days. Seek emergency care if you develop fever over 104 or shortness of breath.    Patient verbalized understanding and is agreeable to plan. The patient was discharged ambulatory and in stable condition.    Casi Fuentes PA-C ....................  2/12/2024   1:44 PM

## 2024-02-12 NOTE — PATIENT INSTRUCTIONS
Patient was educated on the natural course of viral illness which typically lasts up to 10 days.  Take medications as directed. Side effects discussed. Conservative measures discussed including increased fluids, nasal saline irrigation (neti pot), warm steamy shower, salt water gargles, cough suppressants, expectorants (Mucinex), decongestants (Pseudofed), and analgesics (Tylenol and/or Ibuprofen). See your primary care provider if symptoms worsen or do not improve in 7 days. Seek emergency care if you develop fever over 104 or shortness of breath.

## 2024-03-14 ENCOUNTER — OFFICE VISIT (OUTPATIENT)
Dept: FAMILY MEDICINE | Facility: CLINIC | Age: 44
End: 2024-03-14
Payer: COMMERCIAL

## 2024-03-14 VITALS
OXYGEN SATURATION: 100 % | DIASTOLIC BLOOD PRESSURE: 64 MMHG | BODY MASS INDEX: 22.76 KG/M2 | HEART RATE: 79 BPM | WEIGHT: 145 LBS | SYSTOLIC BLOOD PRESSURE: 118 MMHG | TEMPERATURE: 98.4 F | HEIGHT: 67 IN | RESPIRATION RATE: 12 BRPM

## 2024-03-14 DIAGNOSIS — R06.02 SOB (SHORTNESS OF BREATH): Primary | ICD-10-CM

## 2024-03-14 PROCEDURE — 99214 OFFICE O/P EST MOD 30 MIN: CPT | Performed by: FAMILY MEDICINE

## 2024-03-14 RX ORDER — FLUTICASONE PROPIONATE 110 UG/1
1 AEROSOL, METERED RESPIRATORY (INHALATION) 2 TIMES DAILY
Qty: 12 G | Refills: 3 | Status: SHIPPED | OUTPATIENT
Start: 2024-03-14

## 2024-03-14 ASSESSMENT — ASTHMA QUESTIONNAIRES
QUESTION_3 LAST FOUR WEEKS HOW OFTEN DID YOUR ASTHMA SYMPTOMS (WHEEZING, COUGHING, SHORTNESS OF BREATH, CHEST TIGHTNESS OR PAIN) WAKE YOU UP AT NIGHT OR EARLIER THAN USUAL IN THE MORNING: NOT AT ALL
ACT_TOTALSCORE: 18
QUESTION_5 LAST FOUR WEEKS HOW WOULD YOU RATE YOUR ASTHMA CONTROL: SOMEWHAT CONTROLLED
QUESTION_4 LAST FOUR WEEKS HOW OFTEN HAVE YOU USED YOUR RESCUE INHALER OR NEBULIZER MEDICATION (SUCH AS ALBUTEROL): ONE OR TWO TIMES PER DAY
QUESTION_1 LAST FOUR WEEKS HOW MUCH OF THE TIME DID YOUR ASTHMA KEEP YOU FROM GETTING AS MUCH DONE AT WORK, SCHOOL OR AT HOME: A LITTLE OF THE TIME
QUESTION_2 LAST FOUR WEEKS HOW OFTEN HAVE YOU HAD SHORTNESS OF BREATH: ONCE OR TWICE A WEEK
ACT_TOTALSCORE: 18

## 2024-03-14 ASSESSMENT — PAIN SCALES - GENERAL: PAINLEVEL: NO PAIN (0)

## 2024-03-14 NOTE — PROGRESS NOTES
Assessment & Plan     SOB (shortness of breath)  Symptoms of asthma not ideally controlled. Will start daily controller medication. Continue using albuterol prn. Consider pulmonary function tests in the future.  - fluticasone (FLOVENT HFA) 110 MCG/ACT inhaler; Inhale 1 puff into the lungs 2 times daily      Marissa Nesbitt is a 43 year old, presenting for the following health issues:  Asthma        3/14/2024     1:10 PM   Additional Questions   Roomed by ANTONIO Segura   Accompanied by self     History of Present Illness     Asthma:  She presents for follow up of asthma.  She has no cough, no wheezing, and no shortness of breath.  She is using a relief medication a few times a month. She does not have a controller medication. Patient is aware of the following triggers: animal dander, exercise or sports, pollens and upper respiratory infections. The patient has not had a visit to the Emergency Room, Urgent Care or Hospital due to asthma since the last clinic visit.     She eats 2-3 servings of fruits and vegetables daily.She consumes 2 sweetened beverage(s) daily.She exercises with enough effort to increase her heart rate 30 to 60 minutes per day.  She exercises with enough effort to increase her heart rate 4 days per week.   She is taking medications regularly.       Asthma worse with cold and flu, longtime to recover - wondering about a preventive asthma medication         11/23/2020     7:43 AM 3/14/2024     1:09 PM   ACT Total Scores   ACT TOTAL SCORE (Goal Greater than or Equal to 20) 20 18   In the past 12 months, how many times did you visit the emergency room for your asthma without being admitted to the hospital? 0 0   In the past 12 months, how many times were you hospitalized overnight because of your asthma? 0 0           Review of Systems  Constitutional, HEENT, cardiovascular, pulmonary, gi and gu systems are negative, except as otherwise noted.      Objective    /64   Pulse 79   Temp 98.4  F  "(36.9  C) (Tympanic)   Resp 12   Ht 1.702 m (5' 7\")   Wt 65.8 kg (145 lb)   LMP 01/10/2024 (Approximate)   SpO2 100%   BMI 22.71 kg/m    Body mass index is 22.71 kg/m .  Physical Exam   GENERAL: alert and no distress  NECK: no adenopathy, no asymmetry, masses, or scars  RESP: lungs clear to auscultation - no rales, rhonchi or wheezes  CV: regular rates and rhythm, normal S1 S2, no S3 or S4, and no murmur, click or rub  PSYCH: mentation appears normal, affect normal/bright            Signed Electronically by: David Richmond DO  "

## 2024-03-25 ENCOUNTER — OFFICE VISIT (OUTPATIENT)
Dept: FAMILY MEDICINE | Facility: CLINIC | Age: 44
End: 2024-03-25
Payer: COMMERCIAL

## 2024-03-25 VITALS
BODY MASS INDEX: 23.42 KG/M2 | WEIGHT: 149.2 LBS | HEART RATE: 77 BPM | OXYGEN SATURATION: 100 % | HEIGHT: 67 IN | RESPIRATION RATE: 14 BRPM | DIASTOLIC BLOOD PRESSURE: 86 MMHG | SYSTOLIC BLOOD PRESSURE: 131 MMHG

## 2024-03-25 DIAGNOSIS — R10.31 RIGHT LOWER QUADRANT PAIN: Primary | ICD-10-CM

## 2024-03-25 LAB
CRP SERPL-MCNC: <3 MG/L
ERYTHROCYTE [DISTWIDTH] IN BLOOD BY AUTOMATED COUNT: 11.7 % (ref 10–15)
HCT VFR BLD AUTO: 40.2 % (ref 35–47)
HGB BLD-MCNC: 13.5 G/DL (ref 11.7–15.7)
MCH RBC QN AUTO: 31.6 PG (ref 26.5–33)
MCHC RBC AUTO-ENTMCNC: 33.6 G/DL (ref 31.5–36.5)
MCV RBC AUTO: 94 FL (ref 78–100)
PLATELET # BLD AUTO: 317 10E3/UL (ref 150–450)
RBC # BLD AUTO: 4.27 10E6/UL (ref 3.8–5.2)
WBC # BLD AUTO: 7.9 10E3/UL (ref 4–11)

## 2024-03-25 PROCEDURE — 85027 COMPLETE CBC AUTOMATED: CPT | Performed by: FAMILY MEDICINE

## 2024-03-25 PROCEDURE — 36415 COLL VENOUS BLD VENIPUNCTURE: CPT | Performed by: FAMILY MEDICINE

## 2024-03-25 PROCEDURE — 86140 C-REACTIVE PROTEIN: CPT | Performed by: FAMILY MEDICINE

## 2024-03-25 PROCEDURE — 99213 OFFICE O/P EST LOW 20 MIN: CPT | Performed by: FAMILY MEDICINE

## 2024-03-25 NOTE — PROGRESS NOTES
"  Assessment & Plan     (R10.31) Right lower quadrant pain  (primary encounter diagnosis)  Comment: Exam normal .  No tenderness on palpation .   No rebound tenderness .  Good bowel sounds .     Plan: CBC with platelets, CRP, inflammation              Subjective   Delicia is a 43 year old, presenting for the following health issues:  Abdominal Pain (For the past 3 days, Right Lower abdominal pain)        3/25/2024     1:36 PM   Additional Questions   Roomed by ROBINSON Guo   Accompanied by Self     History of Present Illness     Asthma:  She presents for follow up of asthma.  She has no cough, no wheezing, and no shortness of breath.  She is using a relief medication a few times a month. She does not have a controller medication. Patient is aware of the following triggers: animal dander, exercise or sports, pollens and upper respiratory infections. The patient has not had a visit to the Emergency Room, Urgent Care or Hospital due to asthma since the last clinic visit.     She eats 2-3 servings of fruits and vegetables daily.She consumes 2 sweetened beverage(s) daily.She exercises with enough effort to increase her heart rate 30 to 60 minutes per day.  She exercises with enough effort to increase her heart rate 4 days per week.   She is taking medications regularly.         Review of Systems  CONSTITUTIONAL: NEGATIVE for fever, chills, change in weight  GI: NEGATIVE for nausea, abdominal pain, heartburn, or change in bowel habits  Abdominal pain right lower quadrant .    : NEGATIVE for frequency, dysuria, or hematuria      Objective    /86 (BP Location: Right arm, Patient Position: Sitting, Cuff Size: Adult Regular)   Pulse 77   Resp 14   Ht 1.702 m (5' 7\")   Wt 67.7 kg (149 lb 3.2 oz)   LMP 03/17/2024 (Approximate)   SpO2 100%   Breastfeeding No   BMI 23.37 kg/m    Body mass index is 23.37 kg/m .  Physical Exam  Cardiovascular:      Rate and Rhythm: Normal rate.      Pulses: Normal pulses. "   Pulmonary:      Effort: Pulmonary effort is normal.      Breath sounds: Normal breath sounds.   Abdominal:      General: Abdomen is flat. Bowel sounds are normal. There is no distension.      Tenderness: There is no abdominal tenderness.   Skin:     General: Skin is warm.   Neurological:      General: No focal deficit present.   Psychiatric:         Mood and Affect: Mood normal.                Signed Electronically by: Darline Paz MD    Answers submitted by the patient for this visit:  Asthma Visit Questionnaire (Submitted on 3/14/2024)  Chief Complaint: Chronic problems general questions HPI Form  Do you have a cough?: No  Are you experiencing any wheezing in your chest?: No  Do you have any shortness of breath?: No  Use of rescue inhaler:: a few times a month  Taking Asthma medication as prescribed:: I do not have an asthma controller medication  Asthma triggers:: animal dander, exercise or sports, pollens, upper respiratory infections  Have you had any Emergency Room visits, Urgent Care visits, or Hospital Admissions since your last office visit?: No  General Questionnaire (Submitted on 3/14/2024)  Chief Complaint: Chronic problems general questions HPI Form  How many servings of fruits and vegetables do you eat daily?: 2-3  On average, how many sweetened beverages do you drink each day (Examples: soda, juice, sweet tea, etc.  Do NOT count diet or artificially sweetened beverages)?: 2  How many minutes a day do you exercise enough to make your heart beat faster?: 30 to 60  How many days a week do you exercise enough to make your heart beat faster?: 4  How many days per week do you miss taking your medication?: 0

## 2024-03-31 PROBLEM — Z97.5 NEXPLANON IN PLACE: Status: RESOLVED | Noted: 2020-08-18 | Resolved: 2024-03-31

## 2024-03-31 PROBLEM — R00.2 PALPITATIONS: Status: RESOLVED | Noted: 2021-02-03 | Resolved: 2024-03-31

## 2024-03-31 PROBLEM — G93.31 POSTVIRAL FATIGUE SYNDROME: Status: RESOLVED | Noted: 2021-02-09 | Resolved: 2024-03-31

## 2024-03-31 PROBLEM — R06.02 SOB (SHORTNESS OF BREATH): Status: RESOLVED | Noted: 2021-02-09 | Resolved: 2024-03-31

## 2024-06-14 ENCOUNTER — OFFICE VISIT (OUTPATIENT)
Dept: URGENT CARE | Facility: URGENT CARE | Age: 44
End: 2024-06-14
Payer: COMMERCIAL

## 2024-06-14 VITALS
OXYGEN SATURATION: 100 % | TEMPERATURE: 98.2 F | SYSTOLIC BLOOD PRESSURE: 131 MMHG | HEART RATE: 85 BPM | DIASTOLIC BLOOD PRESSURE: 86 MMHG

## 2024-06-14 DIAGNOSIS — J01.90 ACUTE BACTERIAL SINUSITIS: ICD-10-CM

## 2024-06-14 DIAGNOSIS — H65.193 ACUTE EFFUSION OF BOTH MIDDLE EARS: ICD-10-CM

## 2024-06-14 DIAGNOSIS — B96.89 ACUTE BACTERIAL SINUSITIS: ICD-10-CM

## 2024-06-14 DIAGNOSIS — R42 DIZZINESS: Primary | ICD-10-CM

## 2024-06-14 LAB
BASOPHILS # BLD AUTO: 0.1 10E3/UL (ref 0–0.2)
BASOPHILS NFR BLD AUTO: 1 %
EOSINOPHIL # BLD AUTO: 0.1 10E3/UL (ref 0–0.7)
EOSINOPHIL NFR BLD AUTO: 1 %
ERYTHROCYTE [DISTWIDTH] IN BLOOD BY AUTOMATED COUNT: 11.5 % (ref 10–15)
GLUCOSE BLD-MCNC: 114 MG/DL (ref 60–99)
HCT VFR BLD AUTO: 41.1 % (ref 35–47)
HGB BLD-MCNC: 13.8 G/DL (ref 11.7–15.7)
HOLD SPECIMEN: NORMAL
IMM GRANULOCYTES # BLD: 0 10E3/UL
IMM GRANULOCYTES NFR BLD: 0 %
LYMPHOCYTES # BLD AUTO: 1.5 10E3/UL (ref 0.8–5.3)
LYMPHOCYTES NFR BLD AUTO: 25 %
MCH RBC QN AUTO: 31.1 PG (ref 26.5–33)
MCHC RBC AUTO-ENTMCNC: 33.6 G/DL (ref 31.5–36.5)
MCV RBC AUTO: 93 FL (ref 78–100)
MONOCYTES # BLD AUTO: 0.4 10E3/UL (ref 0–1.3)
MONOCYTES NFR BLD AUTO: 6 %
NEUTROPHILS # BLD AUTO: 4.1 10E3/UL (ref 1.6–8.3)
NEUTROPHILS NFR BLD AUTO: 67 %
PLATELET # BLD AUTO: 294 10E3/UL (ref 150–450)
RBC # BLD AUTO: 4.44 10E6/UL (ref 3.8–5.2)
WBC # BLD AUTO: 6.1 10E3/UL (ref 4–11)

## 2024-06-14 PROCEDURE — 36415 COLL VENOUS BLD VENIPUNCTURE: CPT | Performed by: PHYSICIAN ASSISTANT

## 2024-06-14 PROCEDURE — 93005 ELECTROCARDIOGRAM TRACING: CPT | Performed by: PHYSICIAN ASSISTANT

## 2024-06-14 PROCEDURE — 85025 COMPLETE CBC W/AUTO DIFF WBC: CPT | Performed by: PHYSICIAN ASSISTANT

## 2024-06-14 PROCEDURE — 99214 OFFICE O/P EST MOD 30 MIN: CPT | Performed by: PHYSICIAN ASSISTANT

## 2024-06-14 PROCEDURE — 82947 ASSAY GLUCOSE BLOOD QUANT: CPT | Performed by: PHYSICIAN ASSISTANT

## 2024-06-14 RX ORDER — FLUTICASONE PROPIONATE 50 MCG
2 SPRAY, SUSPENSION (ML) NASAL DAILY
Qty: 9.9 ML | Refills: 0 | Status: SHIPPED | OUTPATIENT
Start: 2024-06-14 | End: 2024-06-22

## 2024-06-14 NOTE — PROGRESS NOTES
Assessment & Plan:      Problem List Items Addressed This Visit    None  Visit Diagnoses       Dizziness    -  Primary    Relevant Medications    amoxicillin-clavulanate (AUGMENTIN) 875-125 MG tablet    fluticasone (FLONASE) 50 MCG/ACT nasal spray    Other Relevant Orders    EKG 12-lead, tracing only (Completed)    CBC with platelets and differential (Completed)    Glucose, whole blood (Completed)    Acute effusion of both middle ears        Relevant Medications    amoxicillin-clavulanate (AUGMENTIN) 875-125 MG tablet    Acute bacterial sinusitis        Relevant Medications    amoxicillin-clavulanate (AUGMENTIN) 875-125 MG tablet    fluticasone (FLONASE) 50 MCG/ACT nasal spray          Medical Decision Making  Patient presents for evaluation of on and off dizziness and left shoulder discomfort for 2 weeks.  EKG shows normal sinus rhythm.  CBC and blood glucose are normal.  Physical exam showed significant ear effusion.  Recommend trial of nasal steroids.  Given patient's length of symptoms, suspect she is likely been congested for over 10 days.  This raises concerns for bacterial sinusitis.  Recommend oral antibiotics.  Discussed treatment and symptomatic care.  Allergies and medication interactions reviewed.  Discussed signs of worsening symptoms and when to follow-up with PCP if no symptom improvement.     Subjective:      Delicia Henry is a 43 year old female here for evaluation of dizziness, left-sided neck and shoulder discomfort, and occasional sharp shooting pains through the left anterior chest.  Onset of symptoms was 2 weeks ago with on and off symptoms since then.  Patient has no history of seasonal allergies.  No recent cold-like illnesses.  No fevers.  Dizziness is described as imbalance that will come and go for couple minutes at a time.  Symptoms seem worse last night with dizziness lasting all night long.  Patient also notes being on and off of different birth control pills and is not  sure if she is having side effects from this.  She has occasionally needed to use her albuterol inhaler during this time.     The following portions of the patient's history were reviewed and updated as appropriate: allergies, current medications, and problem list.     Review of Systems  Pertinent items are noted in HPI.    Allergies  Allergies   Allergen Reactions    Erythromycin Nausea    Moxifloxacin Hcl In Nacl Swelling     Swelling tongue, dizzy, itching    Sulfa Antibiotics      Unknown reaction       Family History   Problem Relation Age of Onset    Allergies Father         seasonal    Allergies Sister         seasonal    Thyroid Disease Sister     Thyroid Disease Mother     Diabetes Paternal Grandfather        Social History     Tobacco Use    Smoking status: Never    Smokeless tobacco: Never   Substance Use Topics    Alcohol use: No        Objective:      /86 (BP Location: Right arm, Patient Position: Sitting, Cuff Size: Adult Regular)   Pulse 85   Temp 98.2  F (36.8  C) (Tympanic)   SpO2 100%   General appearance - alert, well appearing, and in no distress and non-toxic  Ears - TMs intact with significant mucoid fluid and bulging bilaterally, no erythema  Nose - no significant tenderness to percussion of sinuses throughout  Mouth - mucous membranes moist, pharynx normal without lesions  Neck - supple, no significant adenopathy  Chest - clear to auscultation, no wheezes, rales or rhonchi, symmetric air entry  Heart - normal rate, regular rhythm, normal S1, S2, no murmurs, rubs, clicks or gallops     Lab & Imaging Results    Results for orders placed or performed in visit on 06/14/24   Glucose, whole blood     Status: Abnormal   Result Value Ref Range    Glucose Whole Blood 114 (H) 60 - 99 mg/dL   CBC with platelets and differential     Status: None   Result Value Ref Range    WBC Count 6.1 4.0 - 11.0 10e3/uL    RBC Count 4.44 3.80 - 5.20 10e6/uL    Hemoglobin 13.8 11.7 - 15.7 g/dL    Hematocrit  41.1 35.0 - 47.0 %    MCV 93 78 - 100 fL    MCH 31.1 26.5 - 33.0 pg    MCHC 33.6 31.5 - 36.5 g/dL    RDW 11.5 10.0 - 15.0 %    Platelet Count 294 150 - 450 10e3/uL    % Neutrophils 67 %    % Lymphocytes 25 %    % Monocytes 6 %    % Eosinophils 1 %    % Basophils 1 %    % Immature Granulocytes 0 %    Absolute Neutrophils 4.1 1.6 - 8.3 10e3/uL    Absolute Lymphocytes 1.5 0.8 - 5.3 10e3/uL    Absolute Monocytes 0.4 0.0 - 1.3 10e3/uL    Absolute Eosinophils 0.1 0.0 - 0.7 10e3/uL    Absolute Basophils 0.1 0.0 - 0.2 10e3/uL    Absolute Immature Granulocytes 0.0 <=0.4 10e3/uL   Extra Tube     Status: None (In process)    Narrative    The following orders were created for panel order Extra Tube.  Procedure                               Abnormality         Status                     ---------                               -----------         ------                     Extra Green Top (Lithium...[[id number]]                      In process                   Please view results for these tests on the individual orders.   CBC with platelets and differential     Status: None    Narrative    The following orders were created for panel order CBC with platelets and differential.  Procedure                               Abnormality         Status                     ---------                               -----------         ------                     CBC with platelets and d...[[id number]]                      Final result                 Please view results for these tests on the individual orders.       I personally reviewed these results and discussed findings with the patient.    The use of Dragon/PowerMic dictation services was used to construct the content of this note; any grammatical errors are non-intentional. Please contact the author directly if you are in need of any clarification.

## 2024-06-22 DIAGNOSIS — R42 DIZZINESS: ICD-10-CM

## 2024-06-22 RX ORDER — FLUTICASONE PROPIONATE 50 MCG
SPRAY, SUSPENSION (ML) NASAL
Qty: 16 G | Refills: 0 | Status: SHIPPED | OUTPATIENT
Start: 2024-06-22

## 2024-07-11 ENCOUNTER — VIRTUAL VISIT (OUTPATIENT)
Dept: MIDWIFE SERVICES | Facility: CLINIC | Age: 44
End: 2024-07-11

## 2024-07-11 DIAGNOSIS — Z30.41 ENCOUNTER FOR SURVEILLANCE OF CONTRACEPTIVE PILLS: Primary | ICD-10-CM

## 2024-07-11 PROCEDURE — 99214 OFFICE O/P EST MOD 30 MIN: CPT | Mod: 95 | Performed by: ADVANCED PRACTICE MIDWIFE

## 2024-07-11 RX ORDER — NORGESTIMATE AND ETHINYL ESTRADIOL 0.25-0.035
1 KIT ORAL DAILY
Qty: 84 TABLET | Refills: 3 | Status: SHIPPED | OUTPATIENT
Start: 2024-07-11

## 2024-07-11 NOTE — PROGRESS NOTES
"Delicia is a 43 year old who is being evaluated via a billable video visit.    How would you like to obtain your AVS? MyChart  If the video visit is dropped, the invitation should be resent by: Text to cell phone: [phone]  Will anyone else be joining your video visit? No        Subjective   Delicia is a 43 year old, presenting for the following health issues:  Contraception    HPI     Delicia presents for discussion of contraception options. She has been on COCs for about 2 yrs. Initially, she was prescribed tri lo sprintec. She reports the first month was \"awful\" but then she felt fine on it. Mood was fine, hair was thick, no issues with sex drive, etc.     However, when she went to the pharmacy to get a refill, she was given tri lo ingrid - has since become very aggravated, zero sex drive, doesn't care about anything - zero \"life\", feels \"bored\" all the time.     States she was switched bay to tri lo sprintec again, but only some months depending on availability at pharmacy. She has since been switching back and forth between between tri lo sprintec and tri lo keisha and states this has been very distressing to her. She was evaluated by primary care for her symptoms of low sex drive, hair loss, and mood changes and was noted to have a normal TSH.     She had tried depo-provera in the past but discontinued due to issues with weight gain. She has also tried Nexplanon and Mirena IUD, but reports both gave her extreme issues with her mood.        ROS: 10 point ROS neg other than the symptoms noted above in the HPI.       Objective           Vitals:  No vitals were obtained today due to virtual visit.    Physical Exam   GENERAL: alert and no distress  EYES: Eyes grossly normal to inspection.  No discharge or erythema, or obvious scleral/conjunctival abnormalities.  RESP: No audible wheeze, cough, or visible cyanosis.    SKIN: Visible skin clear. No significant rash, abnormal pigmentation or lesions.  NEURO: Cranial " nerves grossly intact.  Mentation and speech appropriate for age.  PSYCH: Appropriate affect, tone, and pace of words    ASSESSMENT/PLAN:  (Z30.41) Encounter for surveillance of contraceptive pills  (primary encounter diagnosis)  Plan: norgestimate-ethinyl estradiol (ORTHO-CYCLEN)         0.25-35 MG-MCG tablet  - Discussed all hormonal and non-hormonal birth control options for her. Reviewed risks, benefits, and side effects of each. Ultimately, she would like to try a different pill with the same estrogen and progestin type/level, but in a monophasic, as opposed to triphasic, formulation. Will rx ortho-cyclen x1 yr. Counseled to follow up in about 3 mo if she is still having issues, at which point we could consider Arti as it is more neutral in terms of impact on mood. We did discuss copper IUD as an option, but she prefers the cycle control of hormonal options. She is not interested in the progestin only pill as she states she doesn't like the decreased efficacy.           Video-Visit Details    Type of service:  Video Visit   Originating Location (pt. Location): Home    Distant Location (provider location):  On-site  Platform used for Video Visit: Lee  Signed Electronically by: BROOKS Oliva APRN, CNM    Total time spent was 40 minutes; this includes pre-work time, intra-service time, and post-work time including time spent on documentation which occurred on the date of service.       Joined the call at 7/11/2024, 9:28:00 am.  Left the call at 7/11/2024, 9:46:32 am.  You were on the call for 18 minutes 31 seconds .

## 2024-09-23 ENCOUNTER — PATIENT OUTREACH (OUTPATIENT)
Dept: CARE COORDINATION | Facility: CLINIC | Age: 44
End: 2024-09-23

## 2024-10-07 ENCOUNTER — PATIENT OUTREACH (OUTPATIENT)
Dept: CARE COORDINATION | Facility: CLINIC | Age: 44
End: 2024-10-07

## 2024-10-23 ENCOUNTER — TELEPHONE (OUTPATIENT)
Dept: FAMILY MEDICINE | Facility: CLINIC | Age: 44
End: 2024-10-23

## 2024-10-23 NOTE — TELEPHONE ENCOUNTER
Patient Quality Outreach    Patient is due for the following:   Asthma  -  ACT needed  Colon Cancer Screening  Physical Preventive Adult Physical    Next Steps:   Schedule a Adult Preventative    Type of outreach:    Sent letter.      Questions for provider review:               Gabriela Powers CMA

## 2024-11-17 ENCOUNTER — OFFICE VISIT (OUTPATIENT)
Dept: URGENT CARE | Facility: URGENT CARE | Age: 44
End: 2024-11-17

## 2024-11-17 VITALS
RESPIRATION RATE: 14 BRPM | OXYGEN SATURATION: 100 % | SYSTOLIC BLOOD PRESSURE: 147 MMHG | DIASTOLIC BLOOD PRESSURE: 103 MMHG | HEART RATE: 106 BPM

## 2024-11-17 DIAGNOSIS — R00.2 PALPITATIONS: ICD-10-CM

## 2024-11-17 DIAGNOSIS — R07.89 ATYPICAL CHEST PAIN: Primary | ICD-10-CM

## 2024-11-17 LAB
BASOPHILS # BLD AUTO: 0.1 10E3/UL (ref 0–0.2)
BASOPHILS NFR BLD AUTO: 1 %
EOSINOPHIL # BLD AUTO: 0.2 10E3/UL (ref 0–0.7)
EOSINOPHIL NFR BLD AUTO: 3 %
ERYTHROCYTE [DISTWIDTH] IN BLOOD BY AUTOMATED COUNT: 11.5 % (ref 10–15)
HCT VFR BLD AUTO: 42.8 % (ref 35–47)
HGB BLD-MCNC: 14.3 G/DL (ref 11.7–15.7)
IMM GRANULOCYTES # BLD: 0 10E3/UL
IMM GRANULOCYTES NFR BLD: 0 %
LYMPHOCYTES # BLD AUTO: 2.1 10E3/UL (ref 0.8–5.3)
LYMPHOCYTES NFR BLD AUTO: 32 %
MCH RBC QN AUTO: 31 PG (ref 26.5–33)
MCHC RBC AUTO-ENTMCNC: 33.4 G/DL (ref 31.5–36.5)
MCV RBC AUTO: 93 FL (ref 78–100)
MONOCYTES # BLD AUTO: 0.4 10E3/UL (ref 0–1.3)
MONOCYTES NFR BLD AUTO: 6 %
NEUTROPHILS # BLD AUTO: 3.8 10E3/UL (ref 1.6–8.3)
NEUTROPHILS NFR BLD AUTO: 58 %
PLATELET # BLD AUTO: 318 10E3/UL (ref 150–450)
RBC # BLD AUTO: 4.61 10E6/UL (ref 3.8–5.2)
WBC # BLD AUTO: 6.5 10E3/UL (ref 4–11)

## 2024-11-17 PROCEDURE — 80053 COMPREHEN METABOLIC PANEL: CPT | Performed by: FAMILY MEDICINE

## 2024-11-17 PROCEDURE — 93000 ELECTROCARDIOGRAM COMPLETE: CPT | Performed by: FAMILY MEDICINE

## 2024-11-17 PROCEDURE — 84443 ASSAY THYROID STIM HORMONE: CPT | Performed by: FAMILY MEDICINE

## 2024-11-17 PROCEDURE — 99213 OFFICE O/P EST LOW 20 MIN: CPT | Performed by: FAMILY MEDICINE

## 2024-11-17 PROCEDURE — 36415 COLL VENOUS BLD VENIPUNCTURE: CPT | Performed by: FAMILY MEDICINE

## 2024-11-17 PROCEDURE — 83735 ASSAY OF MAGNESIUM: CPT | Performed by: FAMILY MEDICINE

## 2024-11-17 PROCEDURE — 85025 COMPLETE CBC W/AUTO DIFF WBC: CPT | Performed by: FAMILY MEDICINE

## 2024-11-17 NOTE — PROGRESS NOTES
SUBJECTIVE:  Chief Complaint   Patient presents with    Palpitations     Heart palpitations started Tuesday, not sure if it is anxiety, painful in chest, chest and back feels like a pressure, yesterday she had a episode where she couldn't even catch her breath and she has been feeling really tired.  She doesn't know if it is stress related.     Delicia Henry is a 44 year old female who presents with a chief complaint of palpitation.    Notice symptoms on Tuesday, very brief episodes of palpitations and did not think much of it.  Has been busy, trying to sell things for holiday.  Noted yesterday that while talking that developed acute episode of chest tightness and felt like she could not breath.  This lasted for about 1 hour.    Had endorsed more fatigue.  Patient developed more chest pain afterwards and today.  denies any URI or fever.  Does not feel similar to prior asthma symptoms.    Patient did check her watch when not feeling well and heart rate was only in 90's.    Denies any illicit drug use, denies excessive caffeine    Past Medical History:   Diagnosis Date    Asthma     exercise induced asthma    MEDICAL HISTORY OF -     recurrent uti's urethral dilitation , tigonitis.    Molar pregnancy     Palpitations 2/3/2021     Current Outpatient Medications   Medication Sig Dispense Refill    albuterol (PROAIR HFA/PROVENTIL HFA/VENTOLIN HFA) 108 (90 Base) MCG/ACT inhaler Inhale 2 puffs into the lungs every 6 hours as needed for shortness of breath, wheezing or cough 18 g 0    fluticasone (FLONASE) 50 MCG/ACT nasal spray SHAKE LIQUID AND USE 2 SPRAYS IN EACH NOSTRIL DAILY FOR 10 DAYS 16 g 0    fluticasone (FLOVENT HFA) 110 MCG/ACT inhaler Inhale 1 puff into the lungs 2 times daily 12 g 3    norgestimate-ethinyl estradiol (ORTHO-CYCLEN) 0.25-35 MG-MCG tablet Take 1 tablet by mouth daily 84 tablet 3    TRI-LO-REINALDO 0.18/0.215/0.25 MG-25 MCG tablet TAKE 1 TABLET BY MOUTH DAILY 84 tablet 3     Social History      Tobacco Use    Smoking status: Never    Smokeless tobacco: Never   Substance Use Topics    Alcohol use: No       ROS:  Review of systems negative except as stated above.    EXAM:   BP (!) 147/103   Pulse 106   Resp 14   SpO2 100%   GENERAL APPEARANCE: healthy, alert and no distress  PSYCH:alert, affect bright    EKG - sinus rhythm, rate 80, ectopic beat, no ST c/w ischemai    Results for orders placed or performed in visit on 11/17/24   CBC with platelets and differential     Status: None   Result Value Ref Range    WBC Count 6.5 4.0 - 11.0 10e3/uL    RBC Count 4.61 3.80 - 5.20 10e6/uL    Hemoglobin 14.3 11.7 - 15.7 g/dL    Hematocrit 42.8 35.0 - 47.0 %    MCV 93 78 - 100 fL    MCH 31.0 26.5 - 33.0 pg    MCHC 33.4 31.5 - 36.5 g/dL    RDW 11.5 10.0 - 15.0 %    Platelet Count 318 150 - 450 10e3/uL    % Neutrophils 58 %    % Lymphocytes 32 %    % Monocytes 6 %    % Eosinophils 3 %    % Basophils 1 %    % Immature Granulocytes 0 %    Absolute Neutrophils 3.8 1.6 - 8.3 10e3/uL    Absolute Lymphocytes 2.1 0.8 - 5.3 10e3/uL    Absolute Monocytes 0.4 0.0 - 1.3 10e3/uL    Absolute Eosinophils 0.2 0.0 - 0.7 10e3/uL    Absolute Basophils 0.1 0.0 - 0.2 10e3/uL    Absolute Immature Granulocytes 0.0 <=0.4 10e3/uL   CBC with platelets and differential     Status: None    Narrative    The following orders were created for panel order CBC with platelets and differential.  Procedure                               Abnormality         Status                     ---------                               -----------         ------                     CBC with platelets and d...[[id number]]                      Final result                 Please view results for these tests on the individual orders.       ASSESSMENT/PLAN:  (R07.89) Atypical chest pain  (primary encounter diagnosis)  Plan: EKG 12-lead complete w/read - Clinics, CBC with        platelets and differential, Comprehensive         metabolic panel (BMP + Alb, Alk Phos, ALT,  AST,        Total. Bili, TP)            (R00.2) Palpitations  Plan: CBC with platelets and differential,         Comprehensive metabolic panel (BMP + Alb, Alk         Phos, ALT, AST, Total. Bili, TP), Magnesium,         TSH with free T4 reflex            Reassurance given, reviewed that further cardiac etiology most likely warranted with heart monitor - this will need to be arranged by primary provider.  Labs obtained and initial CBC normal, will follow up on pending labs and notify if any abnormalities.  To ER if any acute worsening of symptoms.    Follow up with primary provider within 1 week    Florencio Ellison MD  November 17, 2024 5:03 PM

## 2024-11-18 ENCOUNTER — APPOINTMENT (OUTPATIENT)
Dept: GENERAL RADIOLOGY | Facility: CLINIC | Age: 44
End: 2024-11-18
Attending: EMERGENCY MEDICINE

## 2024-11-18 ENCOUNTER — NURSE TRIAGE (OUTPATIENT)
Dept: FAMILY MEDICINE | Facility: CLINIC | Age: 44
End: 2024-11-18

## 2024-11-18 ENCOUNTER — HOSPITAL ENCOUNTER (EMERGENCY)
Facility: CLINIC | Age: 44
Discharge: HOME | End: 2024-11-18
Attending: EMERGENCY MEDICINE | Admitting: EMERGENCY MEDICINE

## 2024-11-18 VITALS
DIASTOLIC BLOOD PRESSURE: 96 MMHG | BODY MASS INDEX: 22.77 KG/M2 | HEART RATE: 104 BPM | HEIGHT: 67 IN | SYSTOLIC BLOOD PRESSURE: 137 MMHG | WEIGHT: 145.06 LBS | TEMPERATURE: 97.7 F | OXYGEN SATURATION: 100 % | RESPIRATION RATE: 18 BRPM

## 2024-11-18 DIAGNOSIS — R00.2 PALPITATIONS: ICD-10-CM

## 2024-11-18 LAB
ALBUMIN SERPL BCG-MCNC: 4.3 G/DL (ref 3.5–5.2)
ALBUMIN SERPL BCG-MCNC: 4.3 G/DL (ref 3.5–5.2)
ALP SERPL-CCNC: 57 U/L (ref 40–150)
ALP SERPL-CCNC: 59 U/L (ref 40–150)
ALT SERPL W P-5'-P-CCNC: 14 U/L (ref 0–50)
ALT SERPL W P-5'-P-CCNC: 15 U/L (ref 0–50)
ANION GAP SERPL CALCULATED.3IONS-SCNC: 11 MMOL/L (ref 7–15)
ANION GAP SERPL CALCULATED.3IONS-SCNC: 15 MMOL/L (ref 7–15)
AST SERPL W P-5'-P-CCNC: 17 U/L (ref 0–45)
AST SERPL W P-5'-P-CCNC: 22 U/L (ref 0–45)
ATRIAL RATE - MUSE: 69 BPM
BASOPHILS # BLD AUTO: 0.1 10E3/UL (ref 0–0.2)
BASOPHILS NFR BLD AUTO: 2 %
BILIRUB SERPL-MCNC: 0.2 MG/DL
BILIRUB SERPL-MCNC: 0.3 MG/DL
BUN SERPL-MCNC: 11.4 MG/DL (ref 6–20)
BUN SERPL-MCNC: 11.6 MG/DL (ref 6–20)
CALCIUM SERPL-MCNC: 8.8 MG/DL (ref 8.8–10.4)
CALCIUM SERPL-MCNC: 9.4 MG/DL (ref 8.8–10.4)
CHLORIDE SERPL-SCNC: 102 MMOL/L (ref 98–107)
CHLORIDE SERPL-SCNC: 103 MMOL/L (ref 98–107)
CREAT SERPL-MCNC: 0.97 MG/DL (ref 0.51–0.95)
CREAT SERPL-MCNC: 1.01 MG/DL (ref 0.51–0.95)
D DIMER PPP FEU-MCNC: <0.27 UG/ML FEU (ref 0–0.5)
DIASTOLIC BLOOD PRESSURE - MUSE: NORMAL MMHG
EGFRCR SERPLBLD CKD-EPI 2021: 69 ML/MIN/1.73M2
EGFRCR SERPLBLD CKD-EPI 2021: 73 ML/MIN/1.73M2
EOSINOPHIL # BLD AUTO: 0.2 10E3/UL (ref 0–0.7)
EOSINOPHIL NFR BLD AUTO: 3 %
ERYTHROCYTE [DISTWIDTH] IN BLOOD BY AUTOMATED COUNT: 11.5 % (ref 10–15)
GLUCOSE SERPL-MCNC: 106 MG/DL (ref 70–99)
GLUCOSE SERPL-MCNC: 90 MG/DL (ref 70–99)
HCG SERPL QL: NEGATIVE
HCO3 SERPL-SCNC: 23 MMOL/L (ref 22–29)
HCO3 SERPL-SCNC: 27 MMOL/L (ref 22–29)
HCT VFR BLD AUTO: 42.6 % (ref 35–47)
HGB BLD-MCNC: 14.4 G/DL (ref 11.7–15.7)
IMM GRANULOCYTES # BLD: 0 10E3/UL
IMM GRANULOCYTES NFR BLD: 0 %
INTERPRETATION ECG - MUSE: NORMAL
LYMPHOCYTES # BLD AUTO: 3.4 10E3/UL (ref 0.8–5.3)
LYMPHOCYTES NFR BLD AUTO: 45 %
MAGNESIUM SERPL-MCNC: 1.9 MG/DL (ref 1.7–2.3)
MAGNESIUM SERPL-MCNC: 2.2 MG/DL (ref 1.7–2.3)
MCH RBC QN AUTO: 30.6 PG (ref 26.5–33)
MCHC RBC AUTO-ENTMCNC: 33.8 G/DL (ref 31.5–36.5)
MCV RBC AUTO: 91 FL (ref 78–100)
MONOCYTES # BLD AUTO: 0.4 10E3/UL (ref 0–1.3)
MONOCYTES NFR BLD AUTO: 6 %
NEUTROPHILS # BLD AUTO: 3.4 10E3/UL (ref 1.6–8.3)
NEUTROPHILS NFR BLD AUTO: 44 %
NRBC # BLD AUTO: 0 10E3/UL
NRBC BLD AUTO-RTO: 0 /100
NT-PROBNP SERPL-MCNC: 39 PG/ML (ref 0–450)
P AXIS - MUSE: 60 DEGREES
PLATELET # BLD AUTO: 331 10E3/UL (ref 150–450)
POTASSIUM SERPL-SCNC: 3.6 MMOL/L (ref 3.4–5.3)
POTASSIUM SERPL-SCNC: 4.1 MMOL/L (ref 3.4–5.3)
PR INTERVAL - MUSE: 160 MS
PROT SERPL-MCNC: 6.9 G/DL (ref 6.4–8.3)
PROT SERPL-MCNC: 7 G/DL (ref 6.4–8.3)
QRS DURATION - MUSE: 78 MS
QT - MUSE: 410 MS
QTC - MUSE: 439 MS
R AXIS - MUSE: 43 DEGREES
RBC # BLD AUTO: 4.7 10E6/UL (ref 3.8–5.2)
SODIUM SERPL-SCNC: 140 MMOL/L (ref 135–145)
SODIUM SERPL-SCNC: 141 MMOL/L (ref 135–145)
SYSTOLIC BLOOD PRESSURE - MUSE: NORMAL MMHG
T AXIS - MUSE: 62 DEGREES
TROPONIN T SERPL HS-MCNC: <6 NG/L
TROPONIN T SERPL HS-MCNC: <6 NG/L
TSH SERPL DL<=0.005 MIU/L-ACNC: 1.25 UIU/ML (ref 0.3–4.2)
TSH SERPL DL<=0.005 MIU/L-ACNC: 3.63 UIU/ML (ref 0.3–4.2)
VENTRICULAR RATE- MUSE: 69 BPM
WBC # BLD AUTO: 7.6 10E3/UL (ref 4–11)

## 2024-11-18 PROCEDURE — 71046 X-RAY EXAM CHEST 2 VIEWS: CPT

## 2024-11-18 PROCEDURE — 83880 ASSAY OF NATRIURETIC PEPTIDE: CPT | Performed by: EMERGENCY MEDICINE

## 2024-11-18 PROCEDURE — 93005 ELECTROCARDIOGRAM TRACING: CPT

## 2024-11-18 PROCEDURE — 84484 ASSAY OF TROPONIN QUANT: CPT | Performed by: EMERGENCY MEDICINE

## 2024-11-18 PROCEDURE — 84703 CHORIONIC GONADOTROPIN ASSAY: CPT | Performed by: EMERGENCY MEDICINE

## 2024-11-18 PROCEDURE — 83735 ASSAY OF MAGNESIUM: CPT | Performed by: EMERGENCY MEDICINE

## 2024-11-18 PROCEDURE — 85004 AUTOMATED DIFF WBC COUNT: CPT | Performed by: EMERGENCY MEDICINE

## 2024-11-18 PROCEDURE — 36415 COLL VENOUS BLD VENIPUNCTURE: CPT | Performed by: EMERGENCY MEDICINE

## 2024-11-18 PROCEDURE — 85014 HEMATOCRIT: CPT | Performed by: EMERGENCY MEDICINE

## 2024-11-18 PROCEDURE — 84443 ASSAY THYROID STIM HORMONE: CPT | Performed by: EMERGENCY MEDICINE

## 2024-11-18 PROCEDURE — 85379 FIBRIN DEGRADATION QUANT: CPT | Performed by: EMERGENCY MEDICINE

## 2024-11-18 PROCEDURE — 99285 EMERGENCY DEPT VISIT HI MDM: CPT | Mod: 25

## 2024-11-18 PROCEDURE — 82310 ASSAY OF CALCIUM: CPT | Performed by: EMERGENCY MEDICINE

## 2024-11-18 ASSESSMENT — ACTIVITIES OF DAILY LIVING (ADL)
ADLS_ACUITY_SCORE: 0

## 2024-11-18 ASSESSMENT — COLUMBIA-SUICIDE SEVERITY RATING SCALE - C-SSRS
6. HAVE YOU EVER DONE ANYTHING, STARTED TO DO ANYTHING, OR PREPARED TO DO ANYTHING TO END YOUR LIFE?: NO
2. HAVE YOU ACTUALLY HAD ANY THOUGHTS OF KILLING YOURSELF IN THE PAST MONTH?: NO
1. IN THE PAST MONTH, HAVE YOU WISHED YOU WERE DEAD OR WISHED YOU COULD GO TO SLEEP AND NOT WAKE UP?: NO

## 2024-11-18 NOTE — ED PROVIDER NOTES
"  Emergency Department Note      History of Present Illness     Chief Complaint   Chest Pain      HPI   Delicia Henry is a 44 year old female who presents with worsening chest palpitations for approximately one week. Patient reports aching intermittent palpitations for most of the week but over the past two days, her palpitations became constant.  The patient put her Apple Watch on yesterday for the first time in a while and tonight it read a heart rate in the 140s. She also notes shortness of breath that began two days ago. Tonight, she reports being half-asleep when she felt an intense chest pain that prompted her to the ED tonight. Patient is currently on a birth control pill. She explains a history of heart palpitations that improved after she discontinued the birth control implant. Reports today her pain was different. Denies pain exacerbating when taking a deep breath. Denies baseline medical problems, history of heart problems, fever, chills, cough, abdominal pain, leg pain or leg swelling.     Independent Historian   None    Review of External Notes   None    Past Medical History     Medical History and Problem List   Asthma  Molar pregnancy    Medications   The patient is currently on no regular medications.    Surgical History   Mammoplasty augmentation  D&C  ENT surgery-tumor excision    Physical Exam     Patient Vitals for the past 24 hrs:   BP Temp Pulse Resp SpO2 Height Weight   11/18/24 0407 -- -- -- -- 100 % -- --   11/18/24 0130 (!) 137/96 -- -- 16 99 % -- --   11/18/24 0104 -- -- -- -- -- 1.702 m (5' 7\") 65.8 kg (145 lb 1 oz)   11/18/24 0103 (!) 180/134 97.7  F (36.5  C) 104 18 100 % -- --     Physical Exam  General: Sitting on the ED bed  HEENT: Normocephalic, atraumatic  Cardiac: Radial pulses 2+, regular rate and rhythm  Pulm: Breathing comfortably, no accessory muscle usage, no conversational dyspnea, and lungs clear bilaterally  GI: Abdomen soft, nontender, no rigidity or guarding  MSK: " No bony deformities, no edema BLE, no calf tenderness BLE  Skin: Warm and dry  Neuro: Awake and alert  Psych: Pleasant mood and affect    Diagnostics     Lab Results   Labs Ordered and Resulted from Time of ED Arrival to Time of ED Departure   COMPREHENSIVE METABOLIC PANEL - Abnormal       Result Value    Sodium 140      Potassium 3.6      Carbon Dioxide (CO2) 23      Anion Gap 15      Urea Nitrogen 11.6      Creatinine 1.01 (*)     GFR Estimate 69      Calcium 8.8      Chloride 102      Glucose 106 (*)     Alkaline Phosphatase 59      AST 17      ALT 15      Protein Total 7.0      Albumin 4.3      Bilirubin Total 0.3     TROPONIN T, HIGH SENSITIVITY - Normal    Troponin T, High Sensitivity <6     MAGNESIUM - Normal    Magnesium 1.9     TSH WITH FREE T4 REFLEX - Normal    TSH 3.63     NT PROBNP INPATIENT - Normal    N terminal Pro BNP Inpatient 39     D DIMER QUANTITATIVE - Normal    D-Dimer Quantitative <0.27     HCG QUALITATIVE PREGNANCY - Normal    hCG Serum Qualitative Negative     TROPONIN T, HIGH SENSITIVITY - Normal    Troponin T, High Sensitivity <6     CBC WITH PLATELETS AND DIFFERENTIAL    WBC Count 7.6      RBC Count 4.70      Hemoglobin 14.4      Hematocrit 42.6      MCV 91      MCH 30.6      MCHC 33.8      RDW 11.5      Platelet Count 331      % Neutrophils 44      % Lymphocytes 45      % Monocytes 6      % Eosinophils 3      % Basophils 2      % Immature Granulocytes 0      NRBCs per 100 WBC 0      Absolute Neutrophils 3.4      Absolute Lymphocytes 3.4      Absolute Monocytes 0.4      Absolute Eosinophils 0.2      Absolute Basophils 0.1      Absolute Immature Granulocytes 0.0      Absolute NRBCs 0.0       Imaging   XR Chest 2 Views   Final Result   IMPRESSION:   *  Clear lungs.   *  No pleural effusion or pneumothorax.   *  Normal heart size.            EKG   ECG taken at 0130, ECG read at 0151  Sinus rhythm with occasional premature ventricular complexes. Otherwise normal ECG.   PVC new as compared to  prior, dated 1/4/21.  Rate 69 bpm. VT interval 160 ms. QRS duration 78 ms. QT/QTc 410/439 ms. P-R-T axes 60 43 62.    Independent Interpretation   CXR: No infiltrate.    ED Course      Medications Administered   Medications - No data to display    Procedures   Procedures     Discussion of Management   None    ED Course   ED Course as of 11/18/24 0407   Mon Nov 18, 2024   0115 I obtained history and examined the patient as noted above.         Additional Documentation  None    Medical Decision Making / Diagnosis     CMS Diagnoses: None    MIPS       None    [facility]   Delicia Henry is a 44-year-old female presents with palpitations, chest pain as above.  Initial vital signs are notable for tachycardia and hypertension.  EKG is nonischemic.  Lab work is overall reassuring, including negative troponin lower suspicion for ACS and negative D-dimer lower suspicion for PE.  Chest x-ray shows no acute findings.  Overall reassuring workup from an emergency department standpoint.  With the tachycardic episode on the patient's Apple Watch tonight, I do think that further investigation is warranted.  Plan is to discharge home with an order for a Zio patch and also recommending primary care follow-up.  The patient is agreeable.    Disposition   The patient was discharged.     Diagnosis     ICD-10-CM    1. Palpitations  R00.2 ZIO PATCH MAIL OUT               Scribe Disclosure:  I, Dianne Oleary, am serving as a scribe at 1:38 AM on 11/18/2024 to document services personally performed by Gómze Bauer MD based on my observations and the provider's statements to me.        Gómez Bauer MD  11/18/24 9359

## 2024-11-18 NOTE — ED TRIAGE NOTES
Patient reports ongoing palpitations for about 1 week.  She reports increased chest pain this evening.  Anxious and tearful upon triage.  Restarted birth control pills about 1 week ago.  ABCs intact, A&OX4.     Triage Assessment (Adult)       Row Name 11/18/24 0101          Triage Assessment    Airway WDL WDL        Respiratory WDL    Respiratory WDL WDL        Skin Circulation/Temperature WDL    Skin Circulation/Temperature WDL WDL        Cardiac WDL    Cardiac WDL WDL        Peripheral/Neurovascular WDL    Peripheral Neurovascular WDL WDL        Cognitive/Neuro/Behavioral WDL    Cognitive/Neuro/Behavioral WDL WDL

## 2024-11-18 NOTE — TELEPHONE ENCOUNTER
"  Situation   Ongoing chest pain, SOB, excessive fatigue, nausea since er visit last night     Background   Er 11/18/24- see tests and note.   Birth control- pills for about 5 months.   In [place] a couple years ago patient was thought to be having a heart attack- was due to being on one month of an implanted birth control   She had chest pain and severe headache- resolved within 2 days of taking the implant out     BP Readings from Last 3 Encounters:   11/18/24 (!) 137/96   11/17/24 (!) 147/103   06/14/24 131/86       Assessment   Review er note and all tests done in the er     Patient states one week ago Friday she had her first ever heart palpitation and things progressively became worse last week and symptoms were the worse last night when she went to er.     BP was elevated in er, last night she had severe chest pain,  SOB, excessive fatigue, nausea and her HR was up to 140- see er report     She was checked out at er last night and they did not find anything except PVC's on her EKG and was ordered a zio patch.     Patient states the chest pain is not severe like last night but still an ache across her whole chest,sometimes pain goes into her back    excessive fatigue still present today, slept until 1pm,     feeling SOB in the sense when she walks she has no energy to walk \" not like you would be low on oxygen\"   nausea with eating the last 2 days.     Severe headache today- 3 advil a couple hours ago  Severe headache for the past 2 days   3 days ago was mild headache .     HR has been in the 70's-80's  today at rest and 100 with walking.     She asks can stress make your body feel like this?- explained yes but we want you to be ruled out for heart related issue.     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 Sister is not helpful    Patient states but she does not feel " stressed    Palpations still present every now and then   HR has been in the 70's today, has not been elevated since last night     No swelling in legs feet or ankles.   Denied calf pain, vision changes, Denied BE fast except headache.      Does not have a BP cuff at home  Does not sound in distress, speaking in full sentences  .   Recommendations   Advised should be checked out at  er again today to see if assessment labs differ - advised of rationale and risks associated with delaying or not seeking care.   Patient would like to see what Dr. Richmond thinks- explained will call her back.     Future Appointments 11/18/2024 - 5/17/2025        Date Visit Type Length Department Provider     11/19/2024 11:00 AM ED/HOSP FOLLOW UP 30 min  [facility] David Richmond,     Location Instructions:     [facility] is located at [address] [place] [address], along [address] Free parking is available; access the lot by turning north from [address] onto [address], then west onto [facility] [address] [facility].                   Reason for Disposition   Chest pain lasting longer than 5 minutes and ANY of the following:         Pain is crushing, pressure-like, or heavy         Over 44 years old          Over 30 years old and one cardiac risk factor (e.g diabetes, high blood pressure, high cholesterol, smoker, or family history of heart disease)         History of heart disease (e.g. angina, heart attack, heart failure, bypass surgery, takes nitroglycerin)   Difficulty breathing   Chest pain lasting longer than 5 minutes and occurred in last 3 days (72 hours) (Exception: Feels exactly the same as previously diagnosed heartburn and has accompanying sour taste in mouth.)    Additional Information   Negative: SEVERE difficulty breathing (e.g., struggling for each breath, speaks in single words)   Negative: Difficult to awaken or acting confused (e.g., disoriented, slurred speech)   Negative: Shock suspected  (e.g., cold/pale/clammy skin, too weak to stand, low BP, rapid pulse)   Negative: Passed out (i.e., lost consciousness, collapsed and was not responding)   Negative: Visible sweat on face or sweat dripping down face   Negative: Followed an injury to chest   Commented on: Heart beating < 50 beats per minute OR > 140 beats per minute     Last night was the last time HR was elevated about 140   Commented on: Pain also in shoulder(s) or arm(s) or jaw     Radiates to her back.   Commented on: Heart beating irregularly or very rapidly     Not today but previous days    Protocols used: Chest Pain-A-OH

## 2024-11-19 ENCOUNTER — OFFICE VISIT (OUTPATIENT)
Dept: FAMILY MEDICINE | Facility: CLINIC | Age: 44
End: 2024-11-19

## 2024-11-19 VITALS
BODY MASS INDEX: 22.82 KG/M2 | SYSTOLIC BLOOD PRESSURE: 130 MMHG | RESPIRATION RATE: 16 BRPM | TEMPERATURE: 97.4 F | OXYGEN SATURATION: 99 % | HEART RATE: 76 BPM | HEIGHT: 67 IN | WEIGHT: 145.4 LBS | DIASTOLIC BLOOD PRESSURE: 76 MMHG

## 2024-11-19 DIAGNOSIS — J45.21 MILD INTERMITTENT ASTHMA WITH EXACERBATION: ICD-10-CM

## 2024-11-19 DIAGNOSIS — R00.2 PALPITATIONS: Primary | ICD-10-CM

## 2024-11-19 DIAGNOSIS — Z11.59 NEED FOR HEPATITIS C SCREENING TEST: ICD-10-CM

## 2024-11-19 DIAGNOSIS — R00.2 PALPITATIONS: ICD-10-CM

## 2024-11-19 DIAGNOSIS — Z12.11 SCREEN FOR COLON CANCER: ICD-10-CM

## 2024-11-19 DIAGNOSIS — Z12.31 VISIT FOR SCREENING MAMMOGRAM: Primary | ICD-10-CM

## 2024-11-19 PROCEDURE — 99213 OFFICE O/P EST LOW 20 MIN: CPT | Performed by: FAMILY MEDICINE

## 2024-11-19 PROCEDURE — G2211 COMPLEX E/M VISIT ADD ON: HCPCS | Performed by: FAMILY MEDICINE

## 2024-11-19 RX ORDER — LORAZEPAM 0.5 MG/1
0.5 TABLET ORAL EVERY 6 HOURS PRN
Qty: 10 TABLET | Refills: 0 | Status: SHIPPED | OUTPATIENT
Start: 2024-11-19

## 2024-11-19 ASSESSMENT — ASTHMA QUESTIONNAIRES
QUESTION_2 LAST FOUR WEEKS HOW OFTEN HAVE YOU HAD SHORTNESS OF BREATH: ONCE OR TWICE A WEEK
QUESTION_3 LAST FOUR WEEKS HOW OFTEN DID YOUR ASTHMA SYMPTOMS (WHEEZING, COUGHING, SHORTNESS OF BREATH, CHEST TIGHTNESS OR PAIN) WAKE YOU UP AT NIGHT OR EARLIER THAN USUAL IN THE MORNING: NOT AT ALL
QUESTION_4 LAST FOUR WEEKS HOW OFTEN HAVE YOU USED YOUR RESCUE INHALER OR NEBULIZER MEDICATION (SUCH AS ALBUTEROL): ONCE A WEEK OR LESS
ACT_TOTALSCORE: 22
QUESTION_5 LAST FOUR WEEKS HOW WOULD YOU RATE YOUR ASTHMA CONTROL: WELL CONTROLLED
QUESTION_1 LAST FOUR WEEKS HOW MUCH OF THE TIME DID YOUR ASTHMA KEEP YOU FROM GETTING AS MUCH DONE AT WORK, SCHOOL OR AT HOME: NONE OF THE TIME
ACT_TOTALSCORE: 22

## 2024-11-19 NOTE — TELEPHONE ENCOUNTER
Huddled with pcp    Worsening symptoms er/911  Keep appointment tomorrow regardless   Future Appointments 11/18/2024 - 5/17/2025        Date Visit Type Length Department Provider     11/19/2024 11:00 AM ED/HOSP FOLLOW UP 30 min [facility] David Richmond, DO    Location Instructions:     [facility] is located at [address] [place] [address], along [address] Free parking is available; access the lot by turning north from [address] onto [address], then west onto [facility] [address].                       Patient states understanding.

## 2024-11-19 NOTE — PROGRESS NOTES
"  {PROVIDER CHARTING PREFERENCE:[id number]}    Marissa Nesbitt is a 44 year old, presenting for the following health issues:  Chest Pain        11/19/2024    10:42 AM   Additional Questions   Roomed by Verónica ALVAREZ CMA     [facility]       ED/UC Followup:    Facility:  [facility]  Date of visit: 11/18/24  Reason for visit: palpitations  Current Status: patient states today little better.  Heart was jumping all over the place, still having palpitations,ebbing and flowing. Fatigued.       Situation   Ongoing chest pain, SOB, excessive fatigue, nausea since er visit last night        Background   Er 11/18/24- see tests and note.   Birth control- pills for about 5 months.   In [place] a couple years ago patient was thought to be having a heart attack- was due to being on one month of an implanted birth control   She had chest pain and severe headache- resolved within 2 days of taking the implant out        Today, is feeling better than yesterday/.              BP Readings from Last 3 Encounters:   11/18/24 (!) 137/96   11/17/24 (!) 147/103   06/14/24 131/86         Assessment   Review er note and all tests done in the er      Patient states one week ago Friday she had her first ever heart palpitation and things progressively became worse last week and symptoms were the worse last night when she went to er.      BP was elevated in er, last night she had severe chest pain,  SOB, excessive fatigue, nausea and her HR was up to 140- see er report      She was checked out at er last night and they did not find anything except PVC's on her EKG and was ordered a zio patch.      Patient states the chest pain is not severe like last night but still an ache across her whole chest,sometimes pain goes into her back     excessive fatigue still present today, slept until 1pm,      feeling SOB in the sense when she walks she has no energy to walk \" not like you would be low on oxygen\"   nausea with eating the last 2 days.      Severe " headache today- 3 advil a couple hours ago  Severe headache for the past 2 days   3 days ago was mild headache .      HR has been in the 70's-80's  today at rest and 100 with walking.      She asks can stress make your body feel like this?- explained yes but we want you to be ruled out for heart related issue.      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 Sister is not helpful     Patient states but she does not feel stressed     Palpations still present every now and then   HR has been in the 70's today, has not been elevated since last night      No swelling in legs feet or ankles.   Denied calf pain, vision changes, Denied BE fast except headache.      Does not have a BP cuff at home  Does not sound in distress, speaking in full sentences  .   Recommendations   Advised should be checked out at  er again today to see if assessment labs differ - advised of rationale and risks associated with delaying or not seeking care.   Patient would like to see what Dr. Richmond thinks- explained will call her back.      Future Appointments 11/18/2024 - 5/17/2025          Date Visit Type Length Department Provider       11/19/2024 11:00 AM ED/HOSP FOLLOW UP 30 min  [facility] David Richmond, DO     Location Instructions:      [facility] is located at [address] [place] [address], along [address]. Free parking is available; access the lot by turning north from [address] onto [facility], then west onto [facility].                             Reason for Disposition   Chest pain lasting longer than 5 minutes and ANY of the following:         Pain is crushing, pressure-like, or heavy         Over 44 years old          Over 30 years old and one cardiac risk factor (e.g diabetes, high blood pressure, high cholesterol, smoker, or family history of heart disease)          "History of heart disease (e.g. angina, heart attack, heart failure, bypass surgery, takes nitroglycerin)   Difficulty breathing   Chest pain lasting longer than 5 minutes and occurred in last 3 days (72 hours) (Exception: Feels exactly the same as previously diagnosed heartburn and has accompanying sour taste in mouth.)    Additional Information   Negative: SEVERE difficulty breathing (e.g., struggling for each breath, speaks in single words)   Negative: Difficult to awaken or acting confused (e.g., disoriented, slurred speech)   Negative: Shock suspected (e.g., cold/pale/clammy skin, too weak to stand, low BP, rapid pulse)   Negative: Passed out (i.e., lost consciousness, collapsed and was not responding)   Negative: Visible sweat on face or sweat dripping down face   Negative: Followed an injury to chest   Commented on: Heart beating < 50 beats per minute OR > 140 beats per minute     Last night was the last time HR was elevated about 140   Commented on: Pain also in shoulder(s) or arm(s) or jaw     Radiates to her back.   Commented on: Heart beating irregularly or very rapidly     Not today but previous days    Protocols used: Chest Pain-A-OH    {additonal problems for provider to add (Optional):877857}    {ROS Picklists (Optional):676815}      Objective    /76   Pulse 76   Temp 97.4  F (36.3  C) (Tympanic)   Resp 16   Ht 1.702 m (5' 7\")   Wt 66 kg (145 lb 6.4 oz)   LMP 11/05/2024 (Approximate)   SpO2 99%   BMI 22.77 kg/m    Body mass index is 22.77 kg/m .  Physical Exam   {Exam List (Optional):619196}    {Diagnostic Test Results (Optional):262882}        Signed Electronically by: David Richmond DO  {Email feedback regarding this note to [email or web address]   :412550}  "

## 2024-12-06 ENCOUNTER — MYC MEDICAL ADVICE (OUTPATIENT)
Dept: FAMILY MEDICINE | Facility: CLINIC | Age: 44
End: 2024-12-06

## 2024-12-08 RX ORDER — ALBUTEROL SULFATE 90 UG/1
2 INHALANT RESPIRATORY (INHALATION) EVERY 6 HOURS PRN
Qty: 18 G | Refills: 0 | Status: SHIPPED | OUTPATIENT
Start: 2024-12-08

## 2024-12-10 ENCOUNTER — MYC REFILL (OUTPATIENT)
Dept: FAMILY MEDICINE | Facility: CLINIC | Age: 44
End: 2024-12-10

## 2024-12-10 DIAGNOSIS — J45.21 MILD INTERMITTENT ASTHMA WITH EXACERBATION: ICD-10-CM

## 2024-12-10 RX ORDER — ALBUTEROL SULFATE 90 UG/1
2 INHALANT RESPIRATORY (INHALATION) EVERY 6 HOURS PRN
Qty: 18 G | Refills: 0 | OUTPATIENT
Start: 2024-12-10

## 2024-12-17 ENCOUNTER — OFFICE VISIT (OUTPATIENT)
Dept: FAMILY MEDICINE | Facility: CLINIC | Age: 44
End: 2024-12-17

## 2024-12-17 VITALS
BODY MASS INDEX: 22.76 KG/M2 | RESPIRATION RATE: 12 BRPM | SYSTOLIC BLOOD PRESSURE: 128 MMHG | OXYGEN SATURATION: 100 % | HEIGHT: 67 IN | DIASTOLIC BLOOD PRESSURE: 78 MMHG | WEIGHT: 145 LBS | TEMPERATURE: 97.5 F | HEART RATE: 75 BPM

## 2024-12-17 DIAGNOSIS — R06.02 SOB (SHORTNESS OF BREATH): ICD-10-CM

## 2024-12-17 DIAGNOSIS — Z12.31 VISIT FOR SCREENING MAMMOGRAM: Primary | ICD-10-CM

## 2024-12-17 DIAGNOSIS — J45.21 MILD INTERMITTENT ASTHMA WITH EXACERBATION: ICD-10-CM

## 2024-12-17 DIAGNOSIS — R00.2 PALPITATIONS: Primary | ICD-10-CM

## 2024-12-17 DIAGNOSIS — Z11.59 NEED FOR HEPATITIS C SCREENING TEST: ICD-10-CM

## 2024-12-17 DIAGNOSIS — Z12.11 SCREEN FOR COLON CANCER: ICD-10-CM

## 2024-12-17 PROCEDURE — 99213 OFFICE O/P EST LOW 20 MIN: CPT | Performed by: FAMILY MEDICINE

## 2024-12-17 RX ORDER — ALBUTEROL SULFATE 90 UG/1
2 INHALANT RESPIRATORY (INHALATION) EVERY 6 HOURS PRN
Qty: 18 G | Refills: 5 | Status: SHIPPED | OUTPATIENT
Start: 2024-12-17

## 2024-12-17 RX ORDER — FLUTICASONE PROPIONATE 110 UG/1
1 AEROSOL, METERED RESPIRATORY (INHALATION) 2 TIMES DAILY
Qty: 12 G | Refills: 3 | Status: SHIPPED | OUTPATIENT
Start: 2024-12-17

## 2024-12-17 ASSESSMENT — PAIN SCALES - GENERAL: PAINLEVEL_OUTOF10: NO PAIN (0)

## 2024-12-17 NOTE — PROGRESS NOTES
{PROVIDER CHARTING PREFERENCE:599591}    Marissa Nesbitt is a 44 year old, presenting for the following health issues:  RECHECK      12/17/2024     9:57 AM   Additional Questions   Roomed by ANTONIO CESAR CMA   Accompanied by SELF     History of Present Illness       Reason for visit:  Follow up from 11/19/24 OV    She eats 2-3 servings of fruits and vegetables daily.She consumes 1 sweetened beverage(s) daily.She exercises with enough effort to increase her heart rate 30 to 60 minutes per day.  She exercises with enough effort to increase her heart rate 4 days per week.   She is taking medications regularly.     {SUPERLIST (Optional):158626}  {additonal problems for provider to add (Optional):856860}    {ROS Picklists (Optional):063317}      Objective    LMP 11/05/2024 (Approximate)   There is no height or weight on file to calculate BMI.  Physical Exam   {Exam List (Optional):194799}    {Diagnostic Test Results (Optional):201178}        Signed Electronically by: David Richmond DO  {Email feedback regarding this note to [email or web address]   :279526}   complexity in care, I will continue to support Delicia in the subsequent management and with ongoing continuity of care.          Signed Electronically by: David Richmond, DO

## 2024-12-22 ENCOUNTER — HEALTH MAINTENANCE LETTER (OUTPATIENT)
Age: 44
End: 2024-12-22

## 2025-01-16 ENCOUNTER — TELEPHONE (OUTPATIENT)
Dept: DERMATOLOGY | Facility: CLINIC | Age: 45
End: 2025-01-16

## 2025-01-16 NOTE — TELEPHONE ENCOUNTER
Online Appointment Request     [facility]  Phone Message      Reason for Call: Appointment Intake     Patients Requests:      Reason for appointment  I would like to have a mole on my left arm checked out   Preferred Visit Type  In-person        Action taken: Other: none; Documenting patient was called; detail message was left for patient to call back.

## 2025-03-24 ENCOUNTER — OFFICE VISIT (OUTPATIENT)
Dept: URGENT CARE | Facility: URGENT CARE | Age: 45
End: 2025-03-24
Payer: COMMERCIAL

## 2025-03-24 VITALS
HEIGHT: 67 IN | OXYGEN SATURATION: 99 % | TEMPERATURE: 98.1 F | SYSTOLIC BLOOD PRESSURE: 137 MMHG | WEIGHT: 145 LBS | HEART RATE: 95 BPM | RESPIRATION RATE: 16 BRPM | DIASTOLIC BLOOD PRESSURE: 89 MMHG | BODY MASS INDEX: 22.76 KG/M2

## 2025-03-24 DIAGNOSIS — R11.0 NAUSEA: ICD-10-CM

## 2025-03-24 DIAGNOSIS — R42 DIZZINESS: Primary | ICD-10-CM

## 2025-03-24 PROCEDURE — 99213 OFFICE O/P EST LOW 20 MIN: CPT | Performed by: FAMILY MEDICINE

## 2025-03-24 PROCEDURE — 3075F SYST BP GE 130 - 139MM HG: CPT | Performed by: FAMILY MEDICINE

## 2025-03-24 PROCEDURE — 3079F DIAST BP 80-89 MM HG: CPT | Performed by: FAMILY MEDICINE

## 2025-03-24 RX ORDER — MECLIZINE HYDROCHLORIDE 25 MG/1
25 TABLET ORAL 3 TIMES DAILY PRN
Qty: 30 TABLET | Refills: 0 | Status: SHIPPED | OUTPATIENT
Start: 2025-03-24

## 2025-03-24 RX ORDER — ONDANSETRON 4 MG/1
4 TABLET, ORALLY DISINTEGRATING ORAL EVERY 8 HOURS PRN
Qty: 20 TABLET | Refills: 0 | Status: SHIPPED | OUTPATIENT
Start: 2025-03-24

## 2025-03-24 NOTE — PROGRESS NOTES
"SUBJECTIVE:   Delicia Henry is a 44 year old female presenting with a chief complaint of ear pain, nausea, dizzy, post nasal drip.  No fever or cough  Onset of symptoms was 3-4 day(s) ago.  Course of illness is waxing and waning    Severity moderate  Current and Associated symptoms: nausea and dizzy/off balance  Treatment measures tried include Fluids and Rest.  Predisposing factors include HX of asthma.    On Friday, started to feel off and was fatigue, while on bus on school trip, felt nauseated.  Endorsed dizziness.  The following day felt okay.  On Sunday, woke up and started to feel similar dizziness and nauseated.  Symptoms waxing and waning    Started taking Vit D and wondering if this is causing symptoms    Will be heading out of town on Wednesday    Past Medical History:   Diagnosis Date    Asthma     exercise induced asthma    MEDICAL HISTORY OF -     recurrent uti's urethral dilitation , tigonitis.    Molar pregnancy     Palpitations 2/3/2021     Current Outpatient Medications   Medication Sig Dispense Refill    albuterol (PROAIR HFA/PROVENTIL HFA/VENTOLIN HFA) 108 (90 Base) MCG/ACT inhaler Inhale 2 puffs into the lungs every 6 hours as needed for shortness of breath, wheezing or cough. 18 g 5    fluticasone (FLOVENT HFA) 110 MCG/ACT inhaler Inhale 1 puff into the lungs 2 times daily. 12 g 3    LORazepam (ATIVAN) 0.5 MG tablet Take 1 tablet (0.5 mg) by mouth every 6 hours as needed for anxiety. 10 tablet 0    norgestimate-ethinyl estradiol (ORTHO-CYCLEN) 0.25-35 MG-MCG tablet Take 1 tablet by mouth daily 84 tablet 3     Social History     Tobacco Use    Smoking status: Never    Smokeless tobacco: Never   Substance Use Topics    Alcohol use: No       ROS:  Review of systems negative except as stated above.    OBJECTIVE:  /89   Pulse 95   Temp 98.1  F (36.7  C) (Oral)   Resp 16   Ht 1.702 m (5' 7\")   Wt 65.8 kg (145 lb)   LMP 03/22/2025   SpO2 99%   BMI 22.71 kg/m    GENERAL APPEARANCE: " healthy, alert and no distress  EYES: EOMI,  PERRL, conjunctiva clear  HENT: ear canals and TM's normal.  RESP: lungs with no audible wheezes or increase work of breathing  PSYCH: mentation appears normal and affect normal/bright    ASSESSMENT/PLAN:  (R42) Dizziness  (primary encounter diagnosis)  Comment: inner ear  Plan: meclizine (ANTIVERT) 25 MG tablet            (R11.0) Nausea  Plan: ondansetron (ZOFRAN ODT) 4 MG ODT tab            Reassurance given that does not have middle ear infection, reviewed that most likely inner ear etiology.  Encourage rest, plenty of fluids.  Okay for allergy medication to help with post nasal drip/allergies, okay for sudafed to help with eustachian tube dysfunction which can be causing symptoms.  RX meclizine given for dizziness, RX zofran given to help with nausea symptoms    Follow up with primary provider if no improvement of symptoms in 1-2 weeks    Florencio Ellison MD  March 24, 2025 10:40 AM

## 2025-04-02 ENCOUNTER — NURSE TRIAGE (OUTPATIENT)
Dept: FAMILY MEDICINE | Facility: CLINIC | Age: 45
End: 2025-04-02

## 2025-04-02 ENCOUNTER — OFFICE VISIT (OUTPATIENT)
Dept: FAMILY MEDICINE | Facility: CLINIC | Age: 45
End: 2025-04-02
Payer: COMMERCIAL

## 2025-04-02 VITALS
BODY MASS INDEX: 22.62 KG/M2 | OXYGEN SATURATION: 100 % | SYSTOLIC BLOOD PRESSURE: 124 MMHG | RESPIRATION RATE: 16 BRPM | HEART RATE: 83 BPM | HEIGHT: 67 IN | TEMPERATURE: 97.1 F | DIASTOLIC BLOOD PRESSURE: 80 MMHG | WEIGHT: 144.1 LBS

## 2025-04-02 DIAGNOSIS — Z82.49 FAMILY HISTORY OF HYPERTENSION: ICD-10-CM

## 2025-04-02 DIAGNOSIS — Z13.6 CARDIOVASCULAR SCREENING; LDL GOAL LESS THAN 160: ICD-10-CM

## 2025-04-02 DIAGNOSIS — Z01.419 ENCOUNTER FOR GYNECOLOGICAL EXAMINATION WITHOUT ABNORMAL FINDING: ICD-10-CM

## 2025-04-02 DIAGNOSIS — H81.10 BENIGN PAROXYSMAL POSITIONAL VERTIGO, UNSPECIFIED LATERALITY: Primary | ICD-10-CM

## 2025-04-02 DIAGNOSIS — Z11.59 NEED FOR HEPATITIS C SCREENING TEST: ICD-10-CM

## 2025-04-02 DIAGNOSIS — Z12.31 VISIT FOR SCREENING MAMMOGRAM: ICD-10-CM

## 2025-04-02 DIAGNOSIS — H83.09 LABYRINTHITIS, UNSPECIFIED LATERALITY: ICD-10-CM

## 2025-04-02 DIAGNOSIS — J45.40 MODERATE PERSISTENT ASTHMA WITHOUT COMPLICATION: ICD-10-CM

## 2025-04-02 LAB
ALBUMIN SERPL BCG-MCNC: 4.3 G/DL (ref 3.5–5.2)
ALP SERPL-CCNC: 56 U/L (ref 40–150)
ALT SERPL W P-5'-P-CCNC: 18 U/L (ref 0–50)
ANION GAP SERPL CALCULATED.3IONS-SCNC: 11 MMOL/L (ref 7–15)
AST SERPL W P-5'-P-CCNC: 23 U/L (ref 0–45)
BILIRUB SERPL-MCNC: 0.3 MG/DL
BUN SERPL-MCNC: 15 MG/DL (ref 6–20)
CALCIUM SERPL-MCNC: 9.6 MG/DL (ref 8.8–10.4)
CHLORIDE SERPL-SCNC: 101 MMOL/L (ref 98–107)
CREAT SERPL-MCNC: 0.95 MG/DL (ref 0.51–0.95)
EGFRCR SERPLBLD CKD-EPI 2021: 74 ML/MIN/1.73M2
ERYTHROCYTE [DISTWIDTH] IN BLOOD BY AUTOMATED COUNT: 11.6 % (ref 10–15)
GLUCOSE SERPL-MCNC: 90 MG/DL (ref 70–99)
HCO3 SERPL-SCNC: 26 MMOL/L (ref 22–29)
HCT VFR BLD AUTO: 40.2 % (ref 35–47)
HGB BLD-MCNC: 13.7 G/DL (ref 11.7–15.7)
MCH RBC QN AUTO: 31.1 PG (ref 26.5–33)
MCHC RBC AUTO-ENTMCNC: 34.1 G/DL (ref 31.5–36.5)
MCV RBC AUTO: 91 FL (ref 78–100)
PLATELET # BLD AUTO: 325 10E3/UL (ref 150–450)
POTASSIUM SERPL-SCNC: 4.2 MMOL/L (ref 3.4–5.3)
PROT SERPL-MCNC: 7.1 G/DL (ref 6.4–8.3)
RBC # BLD AUTO: 4.41 10E6/UL (ref 3.8–5.2)
SODIUM SERPL-SCNC: 138 MMOL/L (ref 135–145)
TSH SERPL DL<=0.005 MIU/L-ACNC: 1.59 UIU/ML (ref 0.3–4.2)
WBC # BLD AUTO: 7.4 10E3/UL (ref 4–11)

## 2025-04-02 PROCEDURE — 3074F SYST BP LT 130 MM HG: CPT | Performed by: FAMILY MEDICINE

## 2025-04-02 PROCEDURE — 80053 COMPREHEN METABOLIC PANEL: CPT | Performed by: FAMILY MEDICINE

## 2025-04-02 PROCEDURE — 99214 OFFICE O/P EST MOD 30 MIN: CPT | Performed by: FAMILY MEDICINE

## 2025-04-02 PROCEDURE — 36415 COLL VENOUS BLD VENIPUNCTURE: CPT | Performed by: FAMILY MEDICINE

## 2025-04-02 PROCEDURE — 84443 ASSAY THYROID STIM HORMONE: CPT | Performed by: FAMILY MEDICINE

## 2025-04-02 PROCEDURE — 3079F DIAST BP 80-89 MM HG: CPT | Performed by: FAMILY MEDICINE

## 2025-04-02 PROCEDURE — 85027 COMPLETE CBC AUTOMATED: CPT | Performed by: FAMILY MEDICINE

## 2025-04-02 RX ORDER — AMOXICILLIN 500 MG/1
1000 CAPSULE ORAL 2 TIMES DAILY
Qty: 28 CAPSULE | Refills: 0 | Status: SHIPPED | OUTPATIENT
Start: 2025-04-02 | End: 2025-04-09

## 2025-04-02 ASSESSMENT — ASTHMA QUESTIONNAIRES: ACT_TOTALSCORE: 21

## 2025-04-02 NOTE — LETTER
My Asthma Action Plan    Name: Delicia Henry   YOB: 1980  Date: 4/2/2025   My doctor: Milly Savage MD   My clinic: [facility]        My Control Medicine: Fluticasone propionate (Flovent HFA) - 110 mcg 1-2 inhalations twice daily   My Rescue Medicine: Albuterol (Proair/Ventolin/Proventil HFA) 2-4 puffs EVERY 4 HOURS as needed. Use a spacer if recommended by your provider.   My Asthma Severity:   Mild Persistent  Know your asthma triggers: smoke, upper respiratory infections, dust mites, pollens, animal dander, insects/rodents, mold, humidity, aspirin, strong odors and fumes, occupational exposure, exercise or sports, emotions, cold air, and Gastric Reflux               GREEN ZONE   Good Control  I feel good  No cough or wheeze  Can work, sleep and play without asthma symptoms       Take your asthma control medicine every day.     If exercise triggers your asthma, take your rescue medication  15 minutes before exercise or sports, and  During exercise if you have asthma symptoms  Spacer to use with inhaler: If you have a spacer, make sure to use it with your inhaler             YELLOW ZONE Getting Worse  I have ANY of these:  I do not feel good  Cough or wheeze  Chest feels tight  Wake up at night   Keep taking your Green Zone medications  Start taking your rescue medicine:  every 20 minutes for up to 1 hour. Then every 4 hours for 24-48 hours.  If you stay in the Yellow Zone for more than 12-24 hours, contact your doctor.  If you do not return to the Green Zone in 12-24 hours or you get worse, start taking your oral steroid medicine if prescribed by your provider.           RED ZONE Medical Alert - Get Help  I have ANY of these:  I feel awful  Medicine is not helping  Breathing getting harder  Trouble walking or talking  Nose opens wide to breathe       Take your rescue medicine NOW  If your provider has prescribed an oral steroid medicine, start taking it  NOW  Call your doctor NOW  If you are still in the Red Zone after 20 minutes and you have not reached your doctor:  Take your rescue medicine again and  Call 911 or go to the emergency room right away    See your regular doctor within 2 weeks of an Emergency Room or Urgent Care visit for follow-up treatment.          Annual Reminders:  Meet with Asthma Educator,  Flu Shot in the Fall, consider Pneumonia Vaccination for patients with asthma (aged 19 and older).    Pharmacy: [facility] #[id number] - [facility] - [place] [address] [place] [address] & [facility]    Electronically signed by Milly Savage MD   Date: 04/02/25                      Asthma Triggers  How To Control Things That Make Your Asthma Worse    Triggers are things that make your asthma worse.  Look at the list below to help you find your triggers and what you can do about them.  You can help prevent asthma flare-ups by staying away from your triggers.      Trigger                                                          What you can do   Cigarette Smoke  Tobacco smoke can make asthma worse. Do not allow smoking in your home, car or around you.  Be sure no one smokes at a child s day care or school.  If you smoke, ask your health care provider for ways to help you quit.  Ask family members to quit too.  Ask your health care provider for a referral to Quit Plan to help you quit smoking, or call 1-486-297-PLAN.     Colds, Flu, Bronchitis  These are common triggers of asthma. Wash your hands often.  Don t touch your eyes, nose or mouth.  Get a flu shot every year.     Dust Mites  These are tiny bugs that live in cloth or carpet. They are too small to see. Wash sheets and blankets in hot water every week.   Encase pillows and mattress in dust mite proof covers.  Avoid having carpet if you can. If you have carpet, vacuum weekly.   Use a dust mask and HEPA vacuum.   Pollen and Outdoor Mold  Some people are allergic to trees, grass, or weed  pollen, or molds. Try to keep your windows closed.  Limit time out doors when pollen count is high.   Ask you health care provider about taking medicine during allergy season.     Animal Dander  Some people are allergic to skin flakes, urine or saliva from pets with fur or feathers. Keep pets with fur or feathers out of your home.    If you can t keep the pet outdoors, then keep the pet out of your bedroom.  Keep the bedroom door closed.  Keep pets off cloth furniture and away from stuffed toys.     Mice, Rats, and Cockroaches   Some people are allergic to the waste from these pests.   Cover food and garbage.  Clean up spills and food crumbs.  Store grease in the refrigerator.   Keep food out of the bedroom.   Indoor Mold  This can be a trigger if your home has high moisture. Fix leaking faucets, pipes, or other sources of water.   Clean moldy surfaces.  Dehumidify basement if it is damp and smelly.   Smoke, Strong Odors, and Sprays  These can reduce air quality. Stay away from strong odors and sprays, such as perfume, powder, hair spray, paints, smoke incense, paint, cleaning products, candles and new carpet.   Exercise or Sports  Some people with asthma have this trigger. Be active!  Ask your doctor about taking medicine before sports or exercise to prevent symptoms.    Warm up for 5-10 minutes before and after sports or exercise.     Other Triggers of Asthma  Cold air:  Cover your nose and mouth with a scarf.  Sometimes laughing or crying can be a trigger.  Some medicines and food can trigger asthma.

## 2025-04-02 NOTE — PATIENT INSTRUCTIONS
" [facility]  [address] [place] [address]  Office: [phone]   Fax:    [phone]     Please, call our clinic or go to the ER immediately if signs or symptoms worsen or fail to improve as anticipated.    Based on our discussion, I have outlined the following instructions for you:    - Take 2 capsules of amoxicillin, 500 mg each, twice a day for 7 days to help with your inner ear issues.  - Attend vestibular therapy sessions to help with your vertigo. They will evaluate and perform maneuvers to help you.  - Practice any exercises at home that you feel comfortable with, as advised by the vestibular therapist.  - Schedule and attend a mammogram screening.  - Discuss with your OB about scheduling a screening for colon cancer.  - Continue using Flovent for your asthma as directed. Make sure your asthma action plan is up to date.  - Keep track of your blood pressure during your annual exams and stay physically active to maintain fitness.      Thank you again for your visit, and we look forward to supporting you in your journey to better health.     Thank you so much or choosing [facility]  for your Health Care. It was a pleasure seeing you at your visit today! Please contact us with any questions or concerns you may have.                   Milly Savage MD                              To reach your [facility] care team after hours call:   [phone] press #2 \"to speak with your care team\".  This will get you to our clinic instead of routing to central [facility]  scheduling.     PLEASE NOTE OUR HOURS HAVE CHANGED secondary to COVID-19 coronavirus pandemic, as we are trying to minimize patient exposure to the virus,  which is now widespread in the [facility].  These hours may change with very little notice.  We apologize for any inconvenience.       Our current clinic hours are:          Monday- Thursday   7:00am " - 6:00pm  in person.      Friday  7:00am- 5:00pm                       Saturday and Sunday : Closed to in person and virtual visits        We have telephone and virtual visit times available between    7:00am - 6pm on Monday-Friday as well.                                                Phone:  [phone]      Our pharmacy hours: Monday through Friday 8:00am to 5:00pm                        Saturday - 9:00 am to 12 noon       Sunday : Closed.              Phone:  [phone]              ###  Please note: at this time we are not accepting any walk-in visits. ###      There is also information available at our web site:  [email or web address]    If your provider ordered any lab tests and you do not receive the results within 10 business days, please call the clinic.    If you need a medication refill please contact your pharmacy.  Please allow 3 business days for your refill to be completed.    Our clinic offers telephone visits and e visits.  Please ask one of your team members to explain more.      Use [email or web address] (secure email communication and access to your chart) to send your primary care provider a message or make an appointment. Ask someone on your Team how to sign up for [employer].

## 2025-04-02 NOTE — PROGRESS NOTES
Assessment & Plan :     ICD-10-CM    1. Benign paroxysmal positional vertigo, unspecified laterality  H81.10 Physical Therapy  Referral     amoxicillin (AMOXIL) 500 MG capsule     TSH with free T4 reflex     CBC with platelets     Comprehensive metabolic panel (BMP + Alb, Alk Phos, ALT, AST, Total. Bili, TP)     TSH with free T4 reflex     CBC with platelets     Comprehensive metabolic panel (BMP + Alb, Alk Phos, ALT, AST, Total. Bili, TP)      2. Labyrinthitis, unspecified laterality  H83.09 TSH with free T4 reflex     CBC with platelets     Comprehensive metabolic panel (BMP + Alb, Alk Phos, ALT, AST, Total. Bili, TP)     TSH with free T4 reflex     CBC with platelets     Comprehensive metabolic panel (BMP + Alb, Alk Phos, ALT, AST, Total. Bili, TP)      3. Visit for screening mammogram  Z12.31 MA Screening Bilateral w/ Venkata      4. Need for hepatitis C screening test  Z11.59       5. CARDIOVASCULAR SCREENING; LDL GOAL LESS THAN 160  Z13.6 REVIEW OF HEALTH MAINTENANCE PROTOCOL ORDERS      6. Moderate persistent asthma without complication  J45.40 Asthma Action Plan (AAP)      7. Family history of hypertension  Z82.49       8. Encounter for gynecological examination without abnormal finding- pt will schedule annual exam with her [facility]  Z01.419           Assessment & Plan:   Benign paroxysmal positional vertigo  - Likely due to inner ear issues, possibly loose crystals or tiny stones in the semicircular canals. Prescribe amoxicillin 500 mg, 2 capsules twice a day for 7 days. Refer to vestibular therapy for evaluation and maneuvers to address positional vertigo. Provided exercises for home practice if comfortable. Sleep mostly upright (head at 45 degrees) after doing the head maneuvers.     Labyrinthitis:   - Possible inner ear infection contributing to symptoms. Cover for potential infection with amoxicillin. Vestibular therapy as part of treatment.    Visit for screening mammogram  - Order a  "mammogram.    Screen for colon cancer- pt declined referral for this today.   - Discuss with OB for potential scheduling.    Moderate persistent asthma  - Asthma managed with Flovent; no recent overuse of albuterol. Update asthma action plan.    Family history of hypertension  - Family history noted; no current hypertension symptoms. Monitor blood pressure during annual exams; maintain physical fitness.    Patient Instructions  - If labs not done today, schedule a lab-only appointment soon.    Please, call our clinic or go to the ER immediately if signs or symptoms worsen or fail to improve as anticipated.     Return in about 1 month (around 5/2/2025) for Wellness/Preventative Visit with your ob/gyn provider .     MEDICATIONS:   Orders Placed This Encounter   Medications    amoxicillin (AMOXIL) 500 MG capsule     Sig: Take 2 capsules (1,000 mg) by mouth 2 times daily for 7 days.     Dispense:  28 capsule     Refill:  0          - Continue other medications without change  Regular exercise  See Patient Instructions      \"Consent was obtained from the patient to use an AI scribe/documentation tool in the creation of this note.This note/dictation was performed and completed with the assistance of voice recognition software and an AI scribe. It may contain inadvertant transcription  errors,  omissions and/or  inadvertent word substitution.\" --Milly Savage MD           Marissa Nesbitt is a 44 year old, presenting for the following health issues:  Dizziness  and the following other medical problems:      1. Benign paroxysmal positional vertigo, unspecified laterality    2. Labyrinthitis, unspecified laterality    3. Visit for screening mammogram    4. Need for hepatitis C screening test    5. CARDIOVASCULAR SCREENING; LDL GOAL LESS THAN 160    6. Moderate persistent asthma without complication    7. Family history of hypertension    8. Encounter for gynecological examination without abnormal finding- pt " will schedule annual exam with her [facility]            4/2/2025     1:29 PM   Additional Questions   Roomed by Susan YOUNGER   Accompanied by Self     History of Present Illness       Reason for visit:  Shakkyness ans dizzyness  Symptom onset:  1-2 weeks ago   She is taking medications regularly.        Triage note from 4/2/2025  Pt calling stating she went to  on 3/24/2025 - the symptoms started on 3/21/2025.   [place] and she would get the dizziness feeling in the morning and it would disappear after she was up and drinking water. Now that she is back she has been having dizziness kind of throughout the day with internal shakiness and feeling like she is on a boat again      Reason for Disposition   Patient wants to be seen    Additional Information   Negative: SEVERE difficulty breathing (e.g., struggling for each breath, speaks in single words)   Negative: Shock suspected (e.g., cold/pale/clammy skin, too weak to stand, low BP, rapid pulse)   Negative: Difficult to awaken or acting confused (e.g., disoriented, slurred speech)   Negative: Fainted, and still feels dizzy afterwards   Negative: Overdose (accidental or intentional) of medications   Negative: New neurologic deficit that is present now: * Weakness of the face, arm, or leg on one side of the body * Numbness of the face, arm, or leg on one side of the body * Loss of speech or garbled speech   Negative: Heart beating < 50 beats per minute OR > 140 beats per minute   Negative: Sounds like a life-threatening emergency to the triager   Negative: Chest pain   Negative: Rectal bleeding, bloody stool, or tarry-black stool   Negative: Vomiting is main symptom   Negative: Diarrhea is main symptom   Negative: Headache is main symptom   Negative: Heat exhaustion suspected (i.e., dehydration from heat exposure)   Negative: Patient states that they are having an anxiety or panic attack   Negative: Dizziness from low blood sugar  (i.e., < 60 mg/dl or 3.5 mmol/l)   Negative: SEVERE dizziness (e.g., unable to stand, requires support to walk, feels like passing out now)   Negative: SEVERE headache or neck pain   Negative: Spinning or tilting sensation (vertigo) present now and one or more stroke risk factors (i.e., hypertension, diabetes mellitus, prior stroke/TIA, heart attack, age over 60) (Exception: Prior physician evaluation for this AND no different/worse than usual.)   Negative: Neurologic deficit that was brief (now gone), ANY of the following:* Weakness of the face, arm, or leg on one side of the body* Numbness of the face, arm, or leg on one side of the body* Loss of speech or garbled speech   Negative: Loss of vision or double vision  (Exception: Similar to previous migraines.)   Negative: Extra heartbeats, irregular heart beating, or heart is beating very fast (i.e., 'palpitations')   Negative: Difficulty breathing   Negative: Drinking very little and dehydration suspected (e.g., no urine > 12 hours, very dry mouth, very lightheaded)   Negative: Follows bleeding (e.g., stomach, rectum, vagina)  (Exception: Became dizzy from sight of small amount blood.)   Negative: Patient sounds very sick or weak to the triager   Negative: Lightheadedness (dizziness) present now, after 2 hours of rest and fluids   Negative: Spinning or tilting sensation (vertigo) present now   Negative: Fever > 103 F (39.4 C)   Negative: Fever > 100.0 F (37.8 C) and has diabetes mellitus or a weak immune system (e.g., HIV positive, cancer chemotherapy, organ transplant, splenectomy, chronic steroids)   Negative: MODERATE dizziness (e.g., interferes with normal activities)  (Exception: Dizziness caused by heat exposure, sudden standing, or poor fluid intake.)   Negative: Vomiting occurs with dizziness   Negative: Taking a medicine that could cause dizziness (e.g., blood pressure medications, diuretics)   Negative: MILD dizziness (e.g., walking normally) and has NOT  "been evaluated by physician for this (Exception: Dizziness caused by heat exposure, sudden standing, or poor fluid intake.)   Negative: Substance use (drug use) or unhealthy alcohol use, known or suspected    Answer Assessment - Initial Assessment Questions  1. DESCRIPTION: \"Describe your dizziness.\"      Feels like she is on a boat   2. LIGHTHEADED: \"Do you feel lightheaded?\" (e.g., somewhat faint, woozy, weak upon standing)      Yes     3. VERTIGO: \"Do you feel like either you or the room is spinning or tilting?\" (i.e. vertigo)      Feels like I'm on a boat     4. SEVERITY: \"How bad is it?\"  \"Do you feel like you are going to faint?\" \"Can you stand and walk?\"    - MILD: Feels slightly dizzy, but walking normally.    - MODERATE: Feels unsteady when walking, but not falling; interferes with normal activities (e.g., school, work).    - SEVERE: Unable to walk without falling, or requires assistance to walk without falling; feels like passing out now.       Mild     5. ONSET:  \"When did the dizziness begin?\"      3/21/2025 it was coming and going but it has now become more pronounced as time goes on      6. AGGRAVATING FACTORS: \"Does anything make it worse?\" (e.g., standing, change in head position)      Moving makes it worse now     7. HEART RATE: \"Can you tell me your heart rate?\" \"How many beats in 15 seconds?\"  (Note: not all patients can do this)        Its normal pulse   Denies palpations     8. CAUSE: \"What do you think is causing the dizziness?\"      Unsure     9. RECURRENT SYMPTOM: \"Have you had dizziness before?\" If Yes, ask: \"When was the last time?\" \"What happened that time?\"      None     10. OTHER SYMPTOMS: \"Do you have any other symptoms?\" (e.g., fever, chest pain, vomiting, diarrhea, bleeding)  Has slight nausea at times         Denies: cp, SOB, vomiting, diarrhea, bleeding    11. PREGNANCY: \"Is there any chance you are pregnant?\" \"When was your last menstrual period?\"        LMP 3/21/2025    Protocols " used: Dizziness-A-OH  History of Present Illness-  Dizziness and Balance Issues  Delicia, 44, feels dizzy and off balance, like being on a boat. This started 10 days ago on a field trip with her daughter Xiomara. She felt dizzy and nauseated on the bus, but water helped. The dizziness continued all day with weakness like a head rush. She was exhausted and slept for two hours, which is unusual. The dizziness was gone on Saturday but came back mildly on Sunday. On Monday, she went to urgent care before a trip to [place]. Her ears were checked and looked normal, but no labs were done. She got medicine for motion sickness and nausea but didn't take it. The dizziness was gone by Tuesday but came back Wednesday morning with a tingling feeling all over, like too much caffeine. She felt jittery but wasn't visibly shaking. The symptoms came and went during her trip, especially after activity. Drinking water and Gatorade helped. The symptoms have continued since coming home, triggered by talking or activity.    Ear and Nasal Symptoms  Delicia had ear ringing for about 20 minutes only about 1 week ago - came and went. She felt post-nasal drainage without a stuffy nose when she first went to urgent care 9 days ago. She felt ear pressure, thinking it was an ear infection, but it went away.    Tingling and Jitteriness  Delicia feels a tingling sensation all over, like being jittery after caffeine or using an albuterol neb.  This isn't numbness or pins and needles. She links this feeling to the dizziness and finds it very bothersome.    Menstrual Changes  Delicia's period came a week early, which she found unusual, though it has happened before.    Asthma  Delicia has asthma, managed with medication. She used Flovent for four days due to recent asthma symptoms, which started around the same time as her current symptoms.    Family History of Hypertension  Delicia's family has a history of high blood pressure. She noticed  "her own blood pressure fluctuating over the past year, with occasional high readings that normalize upon retesting.      Patient Active Problem List   Diagnosis    Congenital fusion of kidneys    Pleurodynia    Encounter for gynecological examination without abnormal finding- pt will schedule annual exam with her [facility]    Family history of hypertension    Moderate persistent asthma without complication       Current Outpatient Medications   Medication Sig Dispense Refill    albuterol (PROAIR HFA/PROVENTIL HFA/VENTOLIN HFA) 108 (90 Base) MCG/ACT inhaler Inhale 2 puffs into the lungs every 6 hours as needed for shortness of breath, wheezing or cough. 18 g 5    amoxicillin (AMOXIL) 500 MG capsule Take 2 capsules (1,000 mg) by mouth 2 times daily for 7 days. 28 capsule 0    fluticasone (FLOVENT HFA) 110 MCG/ACT inhaler Inhale 1 puff into the lungs 2 times daily. 12 g 3    LORazepam (ATIVAN) 0.5 MG tablet Take 1 tablet (0.5 mg) by mouth every 6 hours as needed for anxiety. 10 tablet 0    norgestimate-ethinyl estradiol (ORTHO-CYCLEN) 0.25-35 MG-MCG tablet Take 1 tablet by mouth daily 84 tablet 3    meclizine (ANTIVERT) 25 MG tablet Take 1 tablet (25 mg) by mouth 3 times daily as needed for dizziness. (Patient not taking: Reported on 4/2/2025) 30 tablet 0    ondansetron (ZOFRAN ODT) 4 MG ODT tab Take 1 tablet (4 mg) by mouth every 8 hours as needed for nausea. (Patient not taking: Reported on 4/2/2025) 20 tablet 0          Allergies   Allergen Reactions    Moxifloxacin Hcl In Nacl Swelling     Swelling tongue, dizzy, itching, really irritable     Erythromycin Nausea    Sulfa Antibiotics      Unknown reaction           Review of Systems  Constitutional, HEENT, cardiovascular, pulmonary, GI, , musculoskeletal, neuro, skin, endocrine and psych systems are negative, except as otherwise noted.      Objective    /80   Pulse 83   Temp 97.1  F (36.2  C) (Tympanic)   Resp 16   Ht 1.702 m (5' 7\")   Wt 65.4 kg " (144 lb 1.6 oz)   LMP 03/22/2025   SpO2 100%   BMI 22.57 kg/m    Body mass index is 22.57 kg/m .  Physical Exam   GENERAL: alert and no distress  EYES: Eyes grossly normal to inspection, PERRL and conjunctivae and sclerae normal, no nystagmus with rapid head turning.   HENT: ear canals and TM's normal, nose and mouth without ulcers or lesions  NECK: no adenopathy, no asymmetry, masses, or scars  RESP: lungs clear to auscultation - no rales, rhonchi or wheezes  CV: regular rate and rhythm, normal S1 S2, no S3 or S4, no murmur, click or rub, no peripheral edema  ABDOMEN: soft, nontender, no hepatosplenomegaly, no masses and bowel sounds normal  MS: no gross musculoskeletal defects noted, no edema  SKIN: no suspicious lesions or rashes  NEURO: Normal strength and tone, mentation intact and speech normal - PERRL, EOMI. Fundoscopic exam reveals sharp discs bilaterally, no evidence of papilledema. Visual fields are fully intact by confrontation.   Cranial nerves 2-12 are fully intact.     Muscle strength is 5/5 at the biceps, triceps, brachioradialis,  strength and finger spread as well as hip flexors and extendors, quadriceps, hamstrings, foot dorsi- and plantar flexion as well as extensor hallucis longus bilaterally.   Reflexes are brisk and symmetric at the biceps, triceps, brachioradialis, patellar, and Achilles tendons bilaterally.  Toes are down going bilaterally.   Gait is normal. Rhomberg is negative. No pronator drift.  Fine finger movements and finger-nose-finger fully intact. Visual fields fully intact by confrontation.     PSYCH: mentation appears normal, affect normal/bright    Orders Only (auto-released) on 11/18/2024   Component Date Value Ref Range Status    [facility] Prelim Results 12/17/2024    Preliminary                    Value:Patient had a min HR of 36 bpm, max HR of 175 bpm, and avg HR of 79 bpm. Predominant underlying rhythm was Sinus Rhythm. Slight P wave morphology changes were noted. Isolated  SVEs were rare (<1.0%), SVE Couplets were rare (<1.0%), and SVE Triplets were rare (<1.0%). Isolated VEs were rare (<1.0%), VE Couplets were rare (<1.0%), and no VE Triplets were present. Ventricular Trigeminy was present.             Signed Electronically by: Milly Savage MD

## 2025-04-02 NOTE — TELEPHONE ENCOUNTER
Pt calling stating she went to  on 3/24/2025 - the symptoms started on 3/21/2025.   [place] and she would get the dizziness feeling in the morning and it would disappear after she was up and drinking water. Now that she is back she has been having dizziness kind of throughout the day with internal shakiness and feeling like she is on a boat again         Reason for Disposition   Patient wants to be seen    Additional Information   Negative: SEVERE difficulty breathing (e.g., struggling for each breath, speaks in single words)   Negative: Shock suspected (e.g., cold/pale/clammy skin, too weak to stand, low BP, rapid pulse)   Negative: Difficult to awaken or acting confused (e.g., disoriented, slurred speech)   Negative: Fainted, and still feels dizzy afterwards   Negative: Overdose (accidental or intentional) of medications   Negative: New neurologic deficit that is present now: * Weakness of the face, arm, or leg on one side of the body * Numbness of the face, arm, or leg on one side of the body * Loss of speech or garbled speech   Negative: Heart beating < 50 beats per minute OR > 140 beats per minute   Negative: Sounds like a life-threatening emergency to the triager   Negative: Chest pain   Negative: Rectal bleeding, bloody stool, or tarry-black stool   Negative: Vomiting is main symptom   Negative: Diarrhea is main symptom   Negative: Headache is main symptom   Negative: Heat exhaustion suspected (i.e., dehydration from heat exposure)   Negative: Patient states that they are having an anxiety or panic attack   Negative: Dizziness from low blood sugar (i.e., < 60 mg/dl or 3.5 mmol/l)   Negative: SEVERE dizziness (e.g., unable to stand, requires support to walk, feels like passing out now)   Negative: SEVERE headache or neck pain   Negative: Spinning or tilting sensation (vertigo) present now and one or more stroke risk factors (i.e., hypertension, diabetes mellitus, prior  "stroke/TIA, heart attack, age over 60) (Exception: Prior physician evaluation for this AND no different/worse than usual.)   Negative: Neurologic deficit that was brief (now gone), ANY of the following:* Weakness of the face, arm, or leg on one side of the body* Numbness of the face, arm, or leg on one side of the body* Loss of speech or garbled speech   Negative: Loss of vision or double vision  (Exception: Similar to previous migraines.)   Negative: Extra heartbeats, irregular heart beating, or heart is beating very fast (i.e., 'palpitations')   Negative: Difficulty breathing   Negative: Drinking very little and dehydration suspected (e.g., no urine > 12 hours, very dry mouth, very lightheaded)   Negative: Follows bleeding (e.g., stomach, rectum, vagina)  (Exception: Became dizzy from sight of small amount blood.)   Negative: Patient sounds very sick or weak to the triager   Negative: Lightheadedness (dizziness) present now, after 2 hours of rest and fluids   Negative: Spinning or tilting sensation (vertigo) present now   Negative: Fever > 103 F (39.4 C)   Negative: Fever > 100.0 F (37.8 C) and has diabetes mellitus or a weak immune system (e.g., HIV positive, cancer chemotherapy, organ transplant, splenectomy, chronic steroids)   Negative: MODERATE dizziness (e.g., interferes with normal activities)  (Exception: Dizziness caused by heat exposure, sudden standing, or poor fluid intake.)   Negative: Vomiting occurs with dizziness   Negative: Taking a medicine that could cause dizziness (e.g., blood pressure medications, diuretics)   Negative: MILD dizziness (e.g., walking normally) and has NOT been evaluated by physician for this (Exception: Dizziness caused by heat exposure, sudden standing, or poor fluid intake.)   Negative: Substance use (drug use) or unhealthy alcohol use, known or suspected    Answer Assessment - Initial Assessment Questions  1. DESCRIPTION: \"Describe your dizziness.\"      Feels like she is on " "a boat   2. LIGHTHEADED: \"Do you feel lightheaded?\" (e.g., somewhat faint, woozy, weak upon standing)      Yes     3. VERTIGO: \"Do you feel like either you or the room is spinning or tilting?\" (i.e. vertigo)      Feels like I'm on a boat     4. SEVERITY: \"How bad is it?\"  \"Do you feel like you are going to faint?\" \"Can you stand and walk?\"    - MILD: Feels slightly dizzy, but walking normally.    - MODERATE: Feels unsteady when walking, but not falling; interferes with normal activities (e.g., school, work).    - SEVERE: Unable to walk without falling, or requires assistance to walk without falling; feels like passing out now.       Mild     5. ONSET:  \"When did the dizziness begin?\"      3/21/2025 it was coming and going but it has now become more pronounced as time goes on      6. AGGRAVATING FACTORS: \"Does anything make it worse?\" (e.g., standing, change in head position)      Moving makes it worse now     7. HEART RATE: \"Can you tell me your heart rate?\" \"How many beats in 15 seconds?\"  (Note: not all patients can do this)        Its normal pulse   Denies palpations     8. CAUSE: \"What do you think is causing the dizziness?\"      Unsure     9. RECURRENT SYMPTOM: \"Have you had dizziness before?\" If Yes, ask: \"When was the last time?\" \"What happened that time?\"      None     10. OTHER SYMPTOMS: \"Do you have any other symptoms?\" (e.g., fever, chest pain, vomiting, diarrhea, bleeding)  Has slight nausea at times         Denies: cp, SOB, vomiting, diarrhea, bleeding    11. PREGNANCY: \"Is there any chance you are pregnant?\" \"When was your last menstrual period?\"        LMP 3/21/2025    Protocols used: Dizziness-A-OH    "

## 2025-04-03 ENCOUNTER — PATIENT OUTREACH (OUTPATIENT)
Dept: CARE COORDINATION | Facility: CLINIC | Age: 45
End: 2025-04-03
Payer: COMMERCIAL

## 2025-04-21 ENCOUNTER — PATIENT OUTREACH (OUTPATIENT)
Dept: CARE COORDINATION | Facility: CLINIC | Age: 45
End: 2025-04-21
Payer: COMMERCIAL

## 2025-05-01 ENCOUNTER — ANCILLARY PROCEDURE (OUTPATIENT)
Dept: GENERAL RADIOLOGY | Facility: CLINIC | Age: 45
End: 2025-05-01
Attending: PHYSICIAN ASSISTANT
Payer: COMMERCIAL

## 2025-05-01 ENCOUNTER — OFFICE VISIT (OUTPATIENT)
Dept: URGENT CARE | Facility: URGENT CARE | Age: 45
End: 2025-05-01
Payer: COMMERCIAL

## 2025-05-01 VITALS
HEIGHT: 67 IN | RESPIRATION RATE: 16 BRPM | BODY MASS INDEX: 22.6 KG/M2 | DIASTOLIC BLOOD PRESSURE: 76 MMHG | OXYGEN SATURATION: 100 % | WEIGHT: 144 LBS | HEART RATE: 72 BPM | TEMPERATURE: 98 F | SYSTOLIC BLOOD PRESSURE: 118 MMHG

## 2025-05-01 DIAGNOSIS — J45.21 MILD INTERMITTENT ASTHMA WITH EXACERBATION: Primary | ICD-10-CM

## 2025-05-01 DIAGNOSIS — J45.21 MILD INTERMITTENT ASTHMA WITH EXACERBATION: ICD-10-CM

## 2025-05-01 RX ORDER — INHALER, ASSIST DEVICES
SPACER (EA) MISCELLANEOUS
Qty: 1 EACH | Refills: 0 | Status: SHIPPED | OUTPATIENT
Start: 2025-05-01

## 2025-05-01 RX ORDER — PREDNISONE 20 MG/1
40 TABLET ORAL DAILY
Qty: 10 TABLET | Refills: 0 | Status: SHIPPED | OUTPATIENT
Start: 2025-05-01 | End: 2025-05-06

## 2025-05-01 RX ORDER — BUDESONIDE AND FORMOTEROL FUMARATE DIHYDRATE 160; 4.5 UG/1; UG/1
AEROSOL RESPIRATORY (INHALATION)
Qty: 20.4 G | Refills: 4 | Status: SHIPPED | OUTPATIENT
Start: 2025-05-01

## 2025-05-01 RX ORDER — IPRATROPIUM BROMIDE AND ALBUTEROL SULFATE 2.5; .5 MG/3ML; MG/3ML
3 SOLUTION RESPIRATORY (INHALATION) ONCE
Status: COMPLETED | OUTPATIENT
Start: 2025-05-01 | End: 2025-05-01

## 2025-05-01 RX ADMIN — IPRATROPIUM BROMIDE AND ALBUTEROL SULFATE 3 ML: 2.5; .5 SOLUTION RESPIRATORY (INHALATION) at 10:28

## 2025-05-01 ASSESSMENT — ENCOUNTER SYMPTOMS
SHORTNESS OF BREATH: 1
FEVER: 0
CHEST TIGHTNESS: 1
ABDOMINAL PAIN: 0
RHINORRHEA: 0
COUGH: 0

## 2025-05-01 NOTE — PROGRESS NOTES
Assessment & Plan:        ICD-10-CM    1. Mild intermittent asthma with exacerbation  J45.21 ipratropium - albuterol 0.5 mg/2.5 mg/3 mL (DUONEB) neb solution 3 mL     XR Chest 2 Views     budesonide-formoterol (SYMBICORT/BREYNA) 160-4.5 MCG/ACT Inhaler     predniSONE (DELTASONE) 20 MG tablet     spacer (OPTICHAMBER MARIA LUISA) holding chamber            Plan/Clinical Decision Making:    Patient presents with chest tightness, SOB for a week. No illness, fever or cough. Normal lung exam. Inhalers not working.   I independently visualized the xray:  no effusion, no opacity.   Duo neb done and symptoms improved.   Discussed treatment options. Start course of prednisone x 5 days, reviewed side effects.   Will switch from Flovent to Symbicort. Discussed SMART therapy. Continue albuterol. Use spacer on inhalers.   Can try daily antihistamine for possible allergies. She is interested in seeing allergist/asthma specialist.     Patient Instructions   Take course of prednisone. Use in the morning.     Switch from Flovent to Symbicort.     Use albuterol as needed.     Follow-up with PCP or asthma/allergist as needed.         Return if symptoms worsen or fail to improve, for in 3-5 days.     At the end of the encounter, I discussed results, diagnosis, medications. Discussed red flags for immediate return to clinic/ER, as well as indications for follow up if no improvement. Patient understood and agreed to plan. Patient was stable for discharge.        Maryana Muñoz PA-C on 5/1/2025 at 10:14 AM    43 minutes spent on the date of the encounter doing chart review, history and exam, documentation and further activities per the note        Subjective:     HPI:    Delicia is a 44 year old female who presents to clinic today for the following health issues:  Chief Complaint   Patient presents with    Urgent Care     X 6 days, noticed her asthma flared up that day in the car, felt like she couldn't breathe and her inhaler didn't help,  "felt ok one day after but this week she feels she can't get a deep breath, not sick at all. Not sure if it is her asthma. Feels like she can't breathe when walking up the stairs, even her Flovent rescue inhaler isn't helping. This morning waking up first 10 minutes felt ok but then it flared up. Usually has fall allergies. Back also hurts.    Asthma     Middle of March felt dizzy, equilibrium felt off went in and was told she might have an ear infection was put on antibiotics and 48 hours felt better. Not sure if related to this.      HPI    Symptoms since Friday. Harder to take deep breath.   Has Flovent and albuterol inhaler.   Not using Flovent daily.   No allergy symptoms, no sneezing. No nasal symptoms.   No fever or illness.   No chest pain or increased working out.  No reflux.       Review of Systems   Constitutional:  Negative for fever.   HENT:  Negative for congestion, rhinorrhea and sneezing.    Respiratory:  Positive for chest tightness and shortness of breath. Negative for cough.    Gastrointestinal:  Negative for abdominal pain.         Patient Active Problem List   Diagnosis    Congenital fusion of kidneys    Pleurodynia    Encounter for gynecological examination without abnormal finding- pt will schedule annual exam with her [facility]    Family history of hypertension    Moderate persistent asthma without complication        Past Medical History:   Diagnosis Date    Asthma     exercise induced asthma    MEDICAL HISTORY OF -     recurrent uti's urethral dilitation , tigonitis.    Molar pregnancy     Palpitations 2/3/2021       Social History     Tobacco Use    Smoking status: Never    Smokeless tobacco: Never   Substance Use Topics    Alcohol use: No             Objective:     Vitals:    05/01/25 1011   BP: 118/76   Pulse: 72   Resp: 16   Temp: 98  F (36.7  C)   SpO2: 100%   Weight: 65.3 kg (144 lb)   Height: 1.702 m (5' 7\")         Physical Exam   EXAM:   Pleasant, alert, appropriate " appearance. NAD.  Head Exam: Normocephalic, atraumatic.  Eye Exam:  non icteric/injection.    Ear Exam: TMs grey without bulging. Normal canals.  Normal pinna.  Nose Exam: Normal external nose.    OroPharynx Exam:  Moist mucous membranes. No erythema, pharynx without exudate or hypertrophy.  Neck/Thyroid Exam:  No LAD.    Chest/Respiratory Exam: CTAB.  Cardiovascular Exam: RRR. No murmur or rubs.      Results:  No results found for any visits on 05/01/25.

## 2025-05-01 NOTE — PATIENT INSTRUCTIONS
Take course of prednisone. Use in the morning.     Switch from Flovent to Symbicort.     Use albuterol as needed.     Follow-up with PCP or asthma/allergist as needed.

## 2025-05-01 NOTE — PROGRESS NOTES
Urgent Care Clinic Visit    Chief Complaint   Patient presents with    Urgent Care     X 6 days, noticed her asthma flared up that day in the car, felt like she couldn't breathe and her inhaler didn't help, felt ok one day after but this week she feels she can't get a deep breath, not sick at all. Not sure if it is her asthma. Feels like she can't breathe when walking up the stairs, even her Flovent rescue inhaler isn't helping. This morning waking up first 10 minutes felt ok but then it flared up. Usually has fall allergies. Back also hurts.    Asthma     Middle of March felt dizzy, equilibrium felt off went in and was told she might have an ear infection was put on antibiotics and 48 hours felt better. Not sure if related to this.                5/1/2025    10:11 AM   Additional Questions   Roomed by Nancy MARLEY   Accompanied by self

## 2025-05-12 ENCOUNTER — OFFICE VISIT (OUTPATIENT)
Dept: DERMATOLOGY | Facility: CLINIC | Age: 45
End: 2025-05-12
Payer: COMMERCIAL

## 2025-05-12 DIAGNOSIS — L81.4 LENTIGINES: ICD-10-CM

## 2025-05-12 DIAGNOSIS — D22.9 MULTIPLE BENIGN NEVI: ICD-10-CM

## 2025-05-12 DIAGNOSIS — D23.9 DERMATOFIBROMA: Primary | ICD-10-CM

## 2025-05-12 PROCEDURE — 1126F AMNT PAIN NOTED NONE PRSNT: CPT | Performed by: STUDENT IN AN ORGANIZED HEALTH CARE EDUCATION/TRAINING PROGRAM

## 2025-05-12 PROCEDURE — 99203 OFFICE O/P NEW LOW 30 MIN: CPT | Performed by: STUDENT IN AN ORGANIZED HEALTH CARE EDUCATION/TRAINING PROGRAM

## 2025-05-12 ASSESSMENT — PAIN SCALES - GENERAL: PAINLEVEL_OUTOF10: NO PAIN (0)

## 2025-05-12 NOTE — LETTER
5/12/2025      Delicia Henry  [address] [facility] [place] [address]      Dear Colleague,    Thank you for referring your patient, Delicia Henry, to the [facility]. Please see a copy of my visit note below.    [facility] Dermatology Note    Encounter Date: May 12, 2025    Dermatology Problem List:    ______________________________________    Impression/Plan:  Delicia was seen today for derm problem.    Diagnoses and all orders for this visit:    Dermatofibroma  - benign    Multiple benign nevi  Lentigines  - Reviewed the compounding benefits of incremental changes to sun protective behaviors including increased frequency of sunscreen and sun protective clothing like broad brimmed hats and longsleeved UPF containing clothing    Daughter has ring worm, not responding to tx        Follow-up PRN.       Staff Involved:  Staff Only    Nick Siegel MD   of Dermatology  Department of Dermatology  [facility] [employer] [facility]      CC:   Chief Complaint   Patient presents with     Derm Problem     Left forearm spot since she was a young child.        History of Present Illness:  Ms. Delicia Henry is a 44 year old female who presents as a new patient.    Pt presents today for concerns about spots on trunk and extremities       Labs:      Physical exam:  Vitals: LMP 04/23/2025   GEN: well developed, well-nourished, in no acute distress, in a pleasant mood.     SKIN: Latham phototype 1  - Sun-exposed skin, which includes the head/face, neck, both arms, digits, and/or nails was examined.   - Flat brown macules and patches in a sun exposed areas on face and extremities  - scattered brown papules on trunk and extremities   - firm papule w/ central hypopigmentation and peripheral hyperpigmentation on dermoscopy that dimples on lateral pressure L forearm  - No other lesions of concern on areas examined.     Past  Medical History:   Past Medical History:   Diagnosis Date     Asthma     exercise induced asthma     MEDICAL HISTORY OF -     recurrent uti's urethral dilitation , tigonitis.     Molar pregnancy      Palpitations 2/3/2021     Past Surgical History:   Procedure Laterality Date     COSMETIC MAMMOPLASTY AUGMENTATION       DILATION AND CURETTAGE SUCTION  02/13/2012    Procedure:DILATION AND CURETTAGE SUCTION; DILATION AND CURETTAGE SUCTION ; Surgeon:LEIGHTON GALLAGHER; Location:[facility] OR     ENT SURGERY      tumor rem under ear, benign       Social History:   reports that she has never smoked. She has never used smokeless tobacco. She reports that she does not drink alcohol and does not use drugs.    Family History:  Family History   Problem Relation Age of Onset     Allergies Father         seasonal     Allergies Sister         seasonal     Thyroid Disease Sister      Thyroid Disease Mother      Diabetes Paternal Grandfather        Medications:  Current Outpatient Medications   Medication Sig Dispense Refill     albuterol (PROAIR HFA/PROVENTIL HFA/VENTOLIN HFA) 108 (90 Base) MCG/ACT inhaler Inhale 2 puffs into the lungs every 6 hours as needed for shortness of breath, wheezing or cough. 18 g 5     budesonide-formoterol (SYMBICORT/BREYNA) 160-4.5 MCG/ACT Inhaler Inhale 1 puff once daily plus 1 puff as needed. May use up to 12 puffs per day. 20.4 g 4     fluticasone (FLOVENT HFA) 110 MCG/ACT inhaler Inhale 1 puff into the lungs 2 times daily. 12 g 3     LORazepam (ATIVAN) 0.5 MG tablet Take 1 tablet (0.5 mg) by mouth every 6 hours as needed for anxiety. 10 tablet 0     meclizine (ANTIVERT) 25 MG tablet Take 1 tablet (25 mg) by mouth 3 times daily as needed for dizziness. 30 tablet 0     norgestimate-ethinyl estradiol (ORTHO-CYCLEN) 0.25-35 MG-MCG tablet Take 1 tablet by mouth daily 84 tablet 3     ondansetron (ZOFRAN ODT) 4 MG ODT tab Take 1 tablet (4 mg) by mouth every 8 hours as needed for nausea. 20 tablet 0     spacer  (OPTICHAMBER MARIA LUISA) holding chamber Use spacer with inhaler 1 each 0     Allergies   Allergen Reactions     Moxifloxacin Hcl In Nacl Swelling     Swelling tongue, dizzy, itching, really irritable      Erythromycin Nausea     Sulfa Antibiotics      Unknown reaction               Again, thank you for allowing me to participate in the care of your patient.        Sincerely,        Nick Siegel MD    Electronically signed

## 2025-05-12 NOTE — PROGRESS NOTES
[facility] Dermatology Note    Encounter Date: May 12, 2025    Dermatology Problem List:    ______________________________________    Impression/Plan:  Delicia was seen today for derm problem.    Diagnoses and all orders for this visit:    Dermatofibroma  - benign    Multiple benign nevi  Lentigines  - Reviewed the compounding benefits of incremental changes to sun protective behaviors including increased frequency of sunscreen and sun protective clothing like broad brimmed hats and longsleeved UPF containing clothing    Daughter has ring worm, not responding to tx        Follow-up PRN.       Staff Involved:  Staff Only    Nick Siegel MD   of Dermatology  Department of Dermatology  [facility]      CC:   Chief Complaint   Patient presents with    Derm Problem     Left forearm spot since she was a young child.        History of Present Illness:  Ms. Delicia Henry is a 44 year old female who presents as a new patient.    Pt presents today for concerns about spots on trunk and extremities       Labs:      Physical exam:  Vitals: LMP 04/23/2025   GEN: well developed, well-nourished, in no acute distress, in a pleasant mood.     SKIN: Latham phototype 1  - Sun-exposed skin, which includes the head/face, neck, both arms, digits, and/or nails was examined.   - Flat brown macules and patches in a sun exposed areas on face and extremities  - scattered brown papules on trunk and extremities   - firm papule w/ central hypopigmentation and peripheral hyperpigmentation on dermoscopy that dimples on lateral pressure L forearm  - No other lesions of concern on areas examined.     Past Medical History:   Past Medical History:   Diagnosis Date    Asthma     exercise induced asthma    MEDICAL HISTORY OF -     recurrent uti's urethral dilitation , tigonitis.    Molar pregnancy     Palpitations 2/3/2021     Past Surgical History:   Procedure Laterality  Date    COSMETIC MAMMOPLASTY AUGMENTATION      DILATION AND CURETTAGE SUCTION  02/13/2012    Procedure:DILATION AND CURETTAGE SUCTION; DILATION AND CURETTAGE SUCTION ; Surgeon:LEIGHTON GALLAGHER; Location:[facility] OR    ENT SURGERY      tumor rem under ear, benign       Social History:   reports that she has never smoked. She has never used smokeless tobacco. She reports that she does not drink alcohol and does not use drugs.    Family History:  Family History   Problem Relation Age of Onset    Allergies Father         seasonal    Allergies Sister         seasonal    Thyroid Disease Sister     Thyroid Disease Mother     Diabetes Paternal Grandfather        Medications:  Current Outpatient Medications   Medication Sig Dispense Refill    albuterol (PROAIR HFA/PROVENTIL HFA/VENTOLIN HFA) 108 (90 Base) MCG/ACT inhaler Inhale 2 puffs into the lungs every 6 hours as needed for shortness of breath, wheezing or cough. 18 g 5    budesonide-formoterol (SYMBICORT/BREYNA) 160-4.5 MCG/ACT Inhaler Inhale 1 puff once daily plus 1 puff as needed. May use up to 12 puffs per day. 20.4 g 4    fluticasone (FLOVENT HFA) 110 MCG/ACT inhaler Inhale 1 puff into the lungs 2 times daily. 12 g 3    LORazepam (ATIVAN) 0.5 MG tablet Take 1 tablet (0.5 mg) by mouth every 6 hours as needed for anxiety. 10 tablet 0    meclizine (ANTIVERT) 25 MG tablet Take 1 tablet (25 mg) by mouth 3 times daily as needed for dizziness. 30 tablet 0    norgestimate-ethinyl estradiol (ORTHO-CYCLEN) 0.25-35 MG-MCG tablet Take 1 tablet by mouth daily 84 tablet 3    ondansetron (ZOFRAN ODT) 4 MG ODT tab Take 1 tablet (4 mg) by mouth every 8 hours as needed for nausea. 20 tablet 0    spacer (OPTICHAMBER MARIA LUISA) holding chamber Use spacer with inhaler 1 each 0     Allergies   Allergen Reactions    Moxifloxacin Hcl In Nacl Swelling     Swelling tongue, dizzy, itching, really irritable     Erythromycin Nausea    Sulfa Antibiotics      Unknown reaction

## 2025-05-17 ENCOUNTER — NURSE TRIAGE (OUTPATIENT)
Dept: NURSING | Facility: CLINIC | Age: 45
End: 2025-05-17
Payer: COMMERCIAL

## 2025-05-17 NOTE — TELEPHONE ENCOUNTER
Nurse Triage SBAR    Is this a 2nd Level Triage? NO    Situation: mouth pain    Background: Recently finished steroid, inhalers, and on a mouth rinse for thrush via non-[facility] provider 5-15-25.    Assessment: Mouth pain continues on roof of mouth, and generally not feeling well.  Denies dyspnea, swallowing trouble, enlarged glands.    Protocol Recommended Disposition:   See PCP Within 3 Days    Recommendation: Patient verbalizes understanding of care advice and agrees with plan     Paradise Gabriel RN  [facility]    Reason for Disposition   [1] MILD-MODERATE mouth pain AND [2] present > 3 days    Additional Information   Negative: SEVERE difficulty breathing (e.g., struggling for each breath, speaks in single words, stridor)   Negative: Sounds like a life-threatening emergency to the triager   Negative: [1] Drooling or spitting out saliva (because can't swallow) AND [2] new-onset   Negative: Electrical burn of mouth   Negative: Chemical burn of mouth   Negative: Tongue is very swollen and tender   Negative: [1] Difficulty breathing AND [2] not severe   Negative: [1] Face is swollen AND [2] fever   Negative: [1] Drinking very little AND [2] dehydration suspected (e.g., no urine > 12 hours, very dry mouth, very lightheaded)   Negative: Patient sounds very sick or weak to the triager   Negative: [1] SEVERE mouth pain (e.g., excruciating) AND [2] not improved after 2 hours of pain medicine   Negative: [1] Bloody crusts on lips or sores in mouth AND [2] rash anywhere elese on body (back, chest, face, palms, soles)   Negative: Face is very swollen   Negative: Large lymph node (> 1 inch or 2.5 cm) under the jaw   Negative: [1] Face is swollen AND [2] no fever   Negative: Receiving chemotherapy or radiation therapy    Protocols used: Mouth Pain-A-AH

## 2025-05-23 ENCOUNTER — MYC REFILL (OUTPATIENT)
Dept: MIDWIFE SERVICES | Facility: CLINIC | Age: 45
End: 2025-05-23
Payer: COMMERCIAL

## 2025-05-23 DIAGNOSIS — Z30.41 ENCOUNTER FOR SURVEILLANCE OF CONTRACEPTIVE PILLS: ICD-10-CM

## 2025-05-27 RX ORDER — NORGESTIMATE AND ETHINYL ESTRADIOL 0.25-0.035
1 KIT ORAL DAILY
Qty: 84 TABLET | Refills: 0 | Status: SHIPPED | OUTPATIENT
Start: 2025-05-27

## 2025-05-27 NOTE — TELEPHONE ENCOUNTER
Last Written Prescription Date:  7/11/24  Last Fill Quantity: 84,  # refills: 3   Last virtual visit: 7/11/2024 with prescribing provider:  Yazmin WILKINSON CNM   Future Office Visit:  not currently scheduled    BP Readings from Last 5 Encounters:   05/01/25 118/76   04/02/25 124/80   03/24/25 137/89   12/27/24 (!) 144/85   12/17/24 128/78       Requested Prescriptions   Pending Prescriptions Disp Refills    norgestimate-ethinyl estradiol (ORTHO-CYCLEN) 0.25-35 MG-MCG tablet 84 tablet 3     Sig: Take 1 tablet by mouth daily.       Contraceptives Protocol Passed - 5/27/2025  2:39 PM        Passed - Patient is not a current smoker if age is 35 or older        Passed - Medication is active on med list and the sig matches. RN to manually verify dose and sig if red X/fail.     If the protocol passes (green check), you do not need to verify med dose and sig.    A prescription matches if they are the same clinical intention.    For Example: once daily and every morning are the same.    The protocol can not identify upper and lower case letters as matching and will fail.     For Example: Take 1 tablet (50 mg) by mouth daily     TAKE 1 TABLET (50 MG) BY MOUTH DAILY    For all fails (red x), verify dose and sig.    If the refill does match what is on file, the RN can still proceed to approve the refill request.       If they do not match, route to the appropriate provider.             Passed - Recent (12 month) or future (90 days) visit with authorizing provider's specialty (provided they have been seen in the past 15 months)     The patient must have completed an in-person or virtual visit within the past 12 months or has a future visit scheduled within the next 90 days with the authorizing provider s specialty.  Urgent care and e-visits do not qualify as an office visit for this protocol.          Passed - Medication indicated for associated diagnosis     Medication is associated with one or more of the following  diagnoses:  Contraception  Acne  Dysmenorrhea  Menorrhagia  Amenorrhea  PCOS  Premenstrual Dysphoric Disorder  Irregular menses  Endometriosis  Contraceptive counseling  Finding of menstrual bleeding  Education about oral contraception  Uses contraception  Initial prescription of oral contraception  Oral contraception-no problem  Oral contraceptive repeat          Passed - No active pregnancy on record        Passed - No positive pregnancy test in past 12 months           Prescription approved per [facility] Refill Protocol.    Rissa MCCARTNEY RN   [facility]

## 2025-07-09 ENCOUNTER — OFFICE VISIT (OUTPATIENT)
Dept: OBGYN | Facility: CLINIC | Age: 45
End: 2025-07-09
Attending: OBSTETRICS & GYNECOLOGY
Payer: COMMERCIAL

## 2025-07-09 VITALS — SYSTOLIC BLOOD PRESSURE: 114 MMHG | DIASTOLIC BLOOD PRESSURE: 76 MMHG | WEIGHT: 144 LBS | BODY MASS INDEX: 22.55 KG/M2

## 2025-07-09 DIAGNOSIS — Z30.41 ENCOUNTER FOR SURVEILLANCE OF CONTRACEPTIVE PILLS: ICD-10-CM

## 2025-07-09 DIAGNOSIS — Z12.4 SCREENING FOR MALIGNANT NEOPLASM OF CERVIX: ICD-10-CM

## 2025-07-09 DIAGNOSIS — Z12.31 VISIT FOR SCREENING MAMMOGRAM: Primary | ICD-10-CM

## 2025-07-09 PROCEDURE — 99396 PREV VISIT EST AGE 40-64: CPT | Performed by: OBSTETRICS & GYNECOLOGY

## 2025-07-09 PROCEDURE — 3074F SYST BP LT 130 MM HG: CPT | Performed by: OBSTETRICS & GYNECOLOGY

## 2025-07-09 PROCEDURE — 99459 PELVIC EXAMINATION: CPT | Performed by: OBSTETRICS & GYNECOLOGY

## 2025-07-09 PROCEDURE — 87624 HPV HI-RISK TYP POOLED RSLT: CPT | Performed by: OBSTETRICS & GYNECOLOGY

## 2025-07-09 PROCEDURE — 3078F DIAST BP <80 MM HG: CPT | Performed by: OBSTETRICS & GYNECOLOGY

## 2025-07-09 RX ORDER — NORGESTIMATE AND ETHINYL ESTRADIOL 0.25-0.035
1 KIT ORAL DAILY
Qty: 84 TABLET | Refills: 3 | Status: SHIPPED | OUTPATIENT
Start: 2025-07-09

## 2025-07-09 NOTE — NURSING NOTE
"Chief Complaint   Patient presents with    Gyn Exam     initial /76   Wt 65.3 kg (144 lb)   LMP 07/02/2025 (Approximate)   BMI 22.55 kg/m   Estimated body mass index is 22.55 kg/m  as calculated from the following:    Height as of 5/1/25: 1.702 m (5' 7\").    Weight as of this encounter: 65.3 kg (144 lb).  BP completed using cuff size erwin Sesay CMA on 7/9/2025 at 1:36 PM    "

## 2025-07-09 NOTE — PROGRESS NOTES
SUBJECTIVE:                                                      Delicia is a 44 year old  female who presents for annual exam.     Patient's last menstrual period was 2025 (approximate).. Menses are regular q 28-30 days and normal lasting 4 days.  Using oral contraceptives for contraception.  She is not currently considering pregnancy.  Besides routine health maintenance, she has no other health concerns today .  GYNECOLOGIC HISTORY:    Delicia is sexually active with 1 male partner and is currently in a monogamous relationship.      History sexually transmitted infections:No STD history    History of abnormal Pap smear: No - age 28- 62 PAP with HPV every 5 years recommended  Family history of breast CA: No  Family history of uterine/ovarian CA: No    Family history of colon CA: No      HISTORY:  OB History    Para Term  AB Living   5 4 4 0 1 4   SAB IAB Ectopic Multiple Live Births   1 0 0 0 4      # Outcome Date GA Lbr Scottie/2nd Weight Sex Type Anes PTL Lv   5 Term 17 38w5d 01:30 / 00:47 2.98 kg (6 lb 9.1 oz) F Vag-Spont Local N ANANYA      Name: CORNELIO HAYWOOD      Apgar1: 9  Apgar5: 10   4 Term 14 37w1d 02:35 / 00:11 2.81 kg (6 lb 3.1 oz) M Vag-Spont IV N ANANYA      Name: CORNELIO GALINDO L      Apgar1: 8  Apgar5: 9   3 Term 03 38w0d 02:00 2.863 kg (6 lb 5 oz) M    ANANYA      Name: Heriberto   2 Term 99 38w0d  3.062 kg (6 lb 12 oz) M Vag-Spont   ANANYA      Name: Luke   1 SAB  8w0d            Past Medical History:   Diagnosis Date    Asthma     exercise induced asthma    MEDICAL HISTORY OF -     recurrent uti's urethral dilitation , tigonitis.    Molar pregnancy     Palpitations 2/3/2021     Past Surgical History:   Procedure Laterality Date    COSMETIC MAMMOPLASTY AUGMENTATION      DILATION AND CURETTAGE SUCTION  2012    Procedure:DILATION AND CURETTAGE SUCTION; DILATION AND CURETTAGE SUCTION ; Surgeon:LEIGHTON GALLAGHER; Location:[facility] OR    ENT  SURGERY      tumor rem under ear, benign     Family History   Problem Relation Age of Onset    Allergies Father         seasonal    Allergies Sister         seasonal    Thyroid Disease Sister     Thyroid Disease Mother     Diabetes Paternal Grandfather      Social History     Socioeconomic History    Marital status:      Spouse name: Tyler    Number of children: 4   Occupational History    Occupation: [employer] - [employer]     Comment: Owner   Tobacco Use    Smoking status: Never    Smokeless tobacco: Never   Vaping Use    Vaping status: Never Used   Substance and Sexual Activity    Alcohol use: No    Drug use: No    Sexual activity: Yes     Partners: Male     Birth control/protection: OCP, Pill   Social History Narrative    Patient has 4 kids (21, 17, 6, 2) and  with a [employer]. Used to work in the airline industry.     Social Drivers of Health     Interpersonal Safety: Low Risk  (11/19/2024)    Interpersonal Safety     Do you feel physically and emotionally safe where you currently live?: Yes     Within the past 12 months, have you been hit, slapped, kicked or otherwise physically hurt by someone?: No     Within the past 12 months, have you been humiliated or emotionally abused in other ways by your partner or ex-partner?: No       Current Outpatient Medications:     albuterol (PROAIR HFA/PROVENTIL HFA/VENTOLIN HFA) 108 (90 Base) MCG/ACT inhaler, Inhale 2 puffs into the lungs every 6 hours as needed for shortness of breath, wheezing or cough., Disp: 18 g, Rfl: 5    fluticasone (FLOVENT HFA) 110 MCG/ACT inhaler, Inhale 1 puff into the lungs 2 times daily., Disp: 12 g, Rfl: 3    budesonide-formoterol (SYMBICORT/BREYNA) 160-4.5 MCG/ACT Inhaler, Inhale 1 puff once daily plus 1 puff as needed. May use up to 12 puffs per day. (Patient not taking: Reported on 7/9/2025), Disp: 20.4 g, Rfl: 4    LORazepam (ATIVAN) 0.5 MG tablet, Take 1 tablet (0.5 mg) by mouth every 6 hours as needed for anxiety.,  Disp: 10 tablet, Rfl: 0    meclizine (ANTIVERT) 25 MG tablet, Take 1 tablet (25 mg) by mouth 3 times daily as needed for dizziness., Disp: 30 tablet, Rfl: 0    norgestimate-ethinyl estradiol (ORTHO-CYCLEN) 0.25-35 MG-MCG tablet, Take 1 tablet by mouth daily., Disp: 84 tablet, Rfl: 0    ondansetron (ZOFRAN ODT) 4 MG ODT tab, Take 1 tablet (4 mg) by mouth every 8 hours as needed for nausea., Disp: 20 tablet, Rfl: 0    spacer (OPTICHAMBER MARIA LUISA) holding chamber, Use spacer with inhaler, Disp: 1 each, Rfl: 0     Allergies   Allergen Reactions    Moxifloxacin Hcl In Nacl Swelling     Swelling tongue, dizzy, itching, really irritable     Erythromycin Nausea    Sulfa Antibiotics      Unknown reaction       Past medical, surgical, social and family history were reviewed and updated in [facility].    ROS:   12 point review of systems negative other than symptoms noted below.      OBJECTIVE:                                                      EXAM:  /76   Wt 65.3 kg (144 lb)   LMP 07/02/2025 (Approximate)   BMI 22.55 kg/m     BMI: Body mass index is 22.55 kg/m .  General: Alert and oriented, no distress.  Psychiatric: Mood and affect within normal limits.  Skin: Warm and dry, no lesions, rashes or discolorations.    Breasts:  Symmetric, no skin changes.  No dominant masses bilaterally. Bilat implants  Lymph:  No cervical, supraclavicular, infraclavicular, axillary or inguinal lymphadenopathy palpable.   Abdomen: Soft, nontender, no hepatosplenomegaly, no rebound or guarding, no masses, no hernias.   Vulva:  No external lesions, normal female hair distribution, no inguinal adenopathy.    Urethra:  Midline, non-tender, well supported, no discharge  Vagina:  Well-estrogenized, no abnormal discharge, no lesions  Cervix: no lesions, no discharge and multiparous  Uterus:  anteverted, smooth contour, without enlargement, mobile, and without tenderness  Ovaries:  No masses appreciated, non-tender, mobile  Rectal Exam:  deferred  Musculoskeletal: extremities normal      COUNSELING:   Reviewed preventive health counseling, as reflected in patient instructions       Colorectal Cancer Screening   reports that she has never smoked. She has never used smokeless tobacco.        ASSESSMENT/PLAN:                                                      44 year old female with satisfactory annual exam  (Z12.31) Visit for screening mammogram  (primary encounter diagnosis)  Comment: Normal annual exam  Plan: MA Screen Bilateral w/Venkata        scheduled    (Z30.41) Encounter for surveillance of contraceptive pills  Comment: refilled x 1 year  Plan:     (Z12.4) Screening for malignant neoplasm of cervix  Comment: Pap obtained today  Plan: HPV and Gynecologic Cytology Panel -         Recommended Age 30-65 Years            Shawn Max MD

## 2025-07-10 LAB
HPV HR 12 DNA CVX QL NAA+PROBE: NEGATIVE
HPV16 DNA CVX QL NAA+PROBE: NEGATIVE
HPV18 DNA CVX QL NAA+PROBE: NEGATIVE
HUMAN PAPILLOMA VIRUS FINAL DIAGNOSIS: NORMAL

## 2025-07-14 LAB
BKR AP ASSOCIATED HPV REPORT: NORMAL
BKR LAB AP GYN ADEQUACY: NORMAL
BKR LAB AP GYN INTERPRETATION: NORMAL
BKR LAB AP LMP: NORMAL
BKR LAB AP PREVIOUS ABNORMAL: NORMAL
PATH REPORT.COMMENTS IMP SPEC: NORMAL
PATH REPORT.COMMENTS IMP SPEC: NORMAL
PATH REPORT.RELEVANT HX SPEC: NORMAL